# Patient Record
Sex: MALE | Race: WHITE | NOT HISPANIC OR LATINO | Employment: OTHER | URBAN - METROPOLITAN AREA
[De-identification: names, ages, dates, MRNs, and addresses within clinical notes are randomized per-mention and may not be internally consistent; named-entity substitution may affect disease eponyms.]

---

## 2020-01-29 ENCOUNTER — EVALUATION (OUTPATIENT)
Dept: PHYSICAL THERAPY | Facility: REHABILITATION | Age: 75
End: 2020-01-29
Payer: COMMERCIAL

## 2020-01-29 DIAGNOSIS — R26.2 AMBULATORY DYSFUNCTION: Primary | ICD-10-CM

## 2020-01-29 PROCEDURE — 97163 PT EVAL HIGH COMPLEX 45 MIN: CPT | Performed by: PHYSICAL THERAPIST

## 2020-01-29 PROCEDURE — 97535 SELF CARE MNGMENT TRAINING: CPT | Performed by: PHYSICAL THERAPIST

## 2020-01-29 NOTE — LETTER
2020    Juany Jimenez DPM  [facility]  [address] [facility]    Patient: Cruz Jacobs   YOB: 1945   Date of Visit: 2020     Encounter Diagnosis     ICD-10-CM    1  Ambulatory dysfunction R26 2        Dear Dr Thanh Mahoney: Thank you for your recent referral of Cruz Jacobs  Please review the attached evaluation summary from Laureano's recent visit  Please verify that you agree with the plan of care by signing the attached order  If you have any questions or concerns, please do not hesitate to call  I sincerely appreciate the opportunity to share in the care of one of your patients and hope to have another opportunity to work with you in the near future  Sincerely,    Camelia Brumfield, PT      Referring Provider:      I certify that I have read the below Plan of Care and certify the need for these services furnished under this plan of treatment while under my care  MICHAEL Dominiqu[facility]1  [facility]: [phone]          PT Evaluation     Today's date: 2020  Patient name: Cruz Jacobs  : 1945  MRN: [id number]  Referring provider: Ba Edward DPM  Dx:   Encounter Diagnosis     ICD-10-CM    1  Ambulatory dysfunction R26 2                   Assessment  Assessment details: Jaret Aguilar reports to PT with CC of declining balance and strength over past 2 weeks with insidious onset  Results of eval indicate abnormal gait pattern, impaired static balance, and decreased endurance  TUG test indicates he is at risk for falls  He had considerable difficulty with 6MWT, but with normal vitals readings following  His LE strength overall is good based on MMT's and 5xSTS test and coordination appears normal  Based on patient's subjective reports, his presentation is concerning as there is no diagnosis to explain his sudden change in mobility/function   The change in his balance and gait deficits are too rapid to be explained by normal age related changes  Will consult with PCP or referring provider regarding these findings  At this time he seems stable and presents with no signs indicating emergent medical examination  He would benefit from skilled PT to address these deficits, reduce fall risk, and reduce difficulty with ambulating  Understanding of Dx/Px/POC: good   Prognosis: good    Goals  STGs (4 weeks)  Pt will be independent with comprehensive HEP   Pt will be able to ambulate with SPC safely for house hold distances    LTG's (to be achieved by d/c)  Pt will be able to self manage sx's independently   Pt will demonstrate at least 5s improvement on TUG  Pt will improve on 6MWT by at least 200ft to improve community ambulation  Pt will be able to ambulate in community w/o difficulty with least restrictive AD    Plan  Plan details: Initiate POC  Will discuss findings with PCP or referring provider  Patient would benefit from: skilled physical therapy  Planned therapy interventions: balance, gait training, therapeutic activities, therapeutic exercise, stretching, strengthening, patient education and neuromuscular re-education  Frequency: 2x week  Duration in weeks: 8  Plan of Care beginning date: 1/29/2020  Plan of Care expiration date: 4/1/2020  Treatment plan discussed with: patient        Subjective Evaluation    History of Present Illness  Mechanism of injury: Pt states that has had worsening balance and strength which seems to have progressed since receiving a flu and pneumonia shot 2 weeks ago  Sx's started a few days after the vaccination  Last time he had a flu shot was over 20 years ago and states he had a reaction to it  Script was written for PT by his podiatrist  He mentioned to his Podiatrist that he was feeling more off balance, but did not mention his concerns regarding his vaccines  He has been doing a home exercise program, but states the exercises do NOT feel harder now       Pt claims that before two weeks ago he ambulated independently w/o AD  Occasionally used SPC in community for safety  Comes to PT today with RW which he states he needs to stay steady at all times  Pt states he was 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  Has been taking steroids since to treat this  He is not sure if this has contributed to his loss of strength and balance  Prednisone, he claims is his only recent medication change  Hx of diabetes type I, well controlled but has been worse with prednisone  States he podiatrist does not feel he has loss of sensation in feet  Reports difficulty with walking, steps, due to weakness and balance  Would like to go to [employer] show in March  Other Med hx: heart attack in , CABG x4, type I DM, vision loss R eye, R total knee     Pain  No pain reported          Objective    Flowsheet Rows      Most Recent Value   PT/OT G-Codes   Current Score  48   Projected Score  53        BP: 116/68  HR: 60bpm irregular   O2: 96% at rest     Balance Test    6 Minute Walk Test (ft): Test stopped at 2min 175ft due to 10/10 fatigue  (using RW)    HR: 98bpm  O2: 95%   2 Minute Walk Test (ft):    Gait Speed (ft/s):    5x Sit To Stand (s): 18 7s no UE's   TUs (using RW)         MCTSIB Number of Seconds   Feet Together, Eyes Open 30s   Feet Together, Eyes Closed 18s *   Feet Together, Eyes Open Foam 8s *   Feet Together, Eyes Closed Foam 0s *     *Pt reached for rails, however demonstrated only minor postural sway          Coordination Left Right   Heel To Cuellar Intact  intact   Finger To Nose Intact, but has shaking of the hands Intact, but has shaking of the hands   Rapid Alternating Movement Intact, but has shaking of the hands Intact, but has shaking of the hands   UE     LE           Manual Muscle Testing - Hip Left Right   Flexion 5 5                    Manual Muscle Testing - Knee Left Right   Flexion 5 5   Extension 5 5     Manual Muscle Testing - Ankle Left Right   Doriflexion 5 5                 Gait Assessment: Pt is able to walk w/o AD or UE support, however with significantly decreased step length  Precautions: Hx of heart disease, prednisone use, fall risk         Manual                                                                                   Exercise Diary  1/29            Bike              Sit to stands              Static balance              Hurdles              Step ups              Walking with [facility]             Education  On proper use of AD and safety on home/community                                                                                                                                                                                         Modalities

## 2020-01-29 NOTE — PROGRESS NOTES
PT Evaluation     Today's date: 2020  Patient name: Clinton Weaver  : 1945  MRN: [id number]  Referring provider: Josep Vizcarra DPM  Dx:   Encounter Diagnosis     ICD-10-CM    1  Ambulatory dysfunction R26 2                   Assessment  Assessment details: Shanda Chirinos reports to PT with CC of declining balance and strength over past 2 weeks with insidious onset  Results of eval indicate abnormal gait pattern, impaired static balance, and decreased endurance  TUG test indicates he is at risk for falls  He had considerable difficulty with 6MWT, but with normal vitals readings following  His LE strength overall is good based on MMT's and 5xSTS test and coordination appears normal  Based on patient's subjective reports, his presentation is concerning as there is no diagnosis to explain his sudden change in mobility/function  The change in his balance and gait deficits are too rapid to be explained by normal age related changes  Will consult with PCP or referring provider regarding these findings  At this time he seems stable and presents with no signs indicating emergent medical examination  He would benefit from skilled PT to address these deficits, reduce fall risk, and reduce difficulty with ambulating  Understanding of Dx/Px/POC: good   Prognosis: good    Goals  STGs (4 weeks)  Pt will be independent with comprehensive HEP   Pt will be able to ambulate with SPC safely for house hold distances    LTG's (to be achieved by d/c)  Pt will be able to self manage sx's independently   Pt will demonstrate at least 5s improvement on TUG  Pt will improve on 6MWT by at least 200ft to improve community ambulation  Pt will be able to ambulate in community w/o difficulty with least restrictive AD    Plan  Plan details: Initiate POC  Will discuss findings with PCP or referring provider     Patient would benefit from: skilled physical therapy  Planned therapy interventions: balance, gait training, therapeutic activities, therapeutic exercise, stretching, strengthening, patient education and neuromuscular re-education  Frequency: 2x week  Duration in weeks: 8  Plan of Care beginning date: 1/29/2020  Plan of Care expiration date: 4/1/2020  Treatment plan discussed with: patient        Subjective Evaluation    History of Present Illness  Mechanism of injury: Pt states that has had worsening balance and strength which seems to have progressed since receiving a flu and pneumonia shot 2 weeks ago  Sx's started a few days after the vaccination  Last time he had a flu shot was over 20 years ago and states he had a reaction to it  Script was written for PT by his podiatrist  He mentioned to his Podiatrist that he was feeling more off balance, but did not mention his concerns regarding his vaccines  He has been doing a home exercise program, but states the exercises do NOT feel harder now  Pt claims that before two weeks ago he ambulated independently w/o AD  Occasionally used SPC in community for safety  Comes to PT today with RW which he states he needs to stay steady at all times  Pt states he was 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  Has been taking steroids since to treat this  He is not sure if this has contributed to his loss of strength and balance  Prednisone, he claims is his only recent medication change  Hx of diabetes type I, well controlled but has been worse with prednisone  States he podiatrist does not feel he has loss of sensation in feet  Reports difficulty with walking, steps, due to weakness and balance  Would like to go to [employer] show in March  Other Med hx: heart attack in 1997, CABG x4, type I DM, vision loss R eye, R total knee     Pain  No pain reported          Objective    Flowsheet Rows      Most Recent Value   PT/OT G-Codes   Current Score  48   Projected Score  53        BP: 116/68  HR: 60bpm irregular   O2: 96% at rest     Balance Test    6 Minute Walk Test (ft): Test stopped at 2min 175ft due to 10/10 fatigue  (using RW)    HR: 98bpm  O2: 95%   2 Minute Walk Test (ft):    Gait Speed (ft/s):    5x Sit To Stand (s): 18 7s no UE's   TUs (using RW)         MCTSIB Number of Seconds   Feet Together, Eyes Open 30s   Feet Together, Eyes Closed 18s *   Feet Together, Eyes Open Foam 8s *   Feet Together, Eyes Closed Foam 0s *     *Pt reached for rails, however demonstrated only minor postural sway          Coordination Left Right   Heel To Cuellar Intact  intact   Finger To Nose Intact, but has shaking of the hands Intact, but has shaking of the hands   Rapid Alternating Movement Intact, but has shaking of the hands Intact, but has shaking of the hands   UE     LE           Manual Muscle Testing - Hip Left Right   Flexion 5 5                    Manual Muscle Testing - Knee Left Right   Flexion 5 5   Extension 5 5     Manual Muscle Testing - Ankle Left Right   Doriflexion 5 5                 Gait Assessment: Pt is able to walk w/o AD or UE support, however with significantly decreased step length  Precautions: Hx of heart disease, prednisone use, fall risk         Manual                                                                                   Exercise Diary              Bike              Sit to stands              Static balance              Hurdles              Step ups              Walking with [facility]             Education  On proper use of AD and safety on home/community                                                                                                                                                                                         Modalities

## 2020-01-31 ENCOUNTER — TRANSCRIBE ORDERS (OUTPATIENT)
Dept: PHYSICAL THERAPY | Facility: REHABILITATION | Age: 75
End: 2020-01-31

## 2020-01-31 DIAGNOSIS — R26.2 DIFFICULTY WALKING: Primary | ICD-10-CM

## 2020-03-19 NOTE — PROGRESS NOTES
Message was left on client's telephone  He was not followed up with PT since Dr Christelle Dean initial evaluation  If symptoms persist, please do not hesitate to call us  Will discharge at this time      Domenic Garcia PT, DPT, CSRS, ACCI

## 2020-12-22 PROBLEM — I50.32 CHRONIC DIASTOLIC HEART FAILURE (HCC): Status: ACTIVE | Noted: 2017-08-06

## 2020-12-22 PROBLEM — I10 ESSENTIAL HYPERTENSION: Status: ACTIVE | Noted: 2018-02-13

## 2020-12-22 PROBLEM — I48.92 ATRIAL FLUTTER (HCC): Status: ACTIVE | Noted: 2017-08-06

## 2020-12-22 PROBLEM — K21.9 GERD (GASTROESOPHAGEAL REFLUX DISEASE): Status: ACTIVE | Noted: 2020-05-26

## 2020-12-22 PROBLEM — G47.33 OBSTRUCTIVE SLEEP APNEA: Status: ACTIVE | Noted: 2020-08-26

## 2023-12-10 ENCOUNTER — TELEPHONE (OUTPATIENT)
Dept: OTHER | Facility: OTHER | Age: 78
End: 2023-12-10

## 2023-12-10 NOTE — TELEPHONE ENCOUNTER
Sandra Smith from [facility] called regarding new pt admission.  Paged on call provider via tc

## 2023-12-11 ENCOUNTER — NURSING HOME VISIT (OUTPATIENT)
Dept: GERIATRICS | Facility: OTHER | Age: 78
End: 2023-12-11
Payer: COMMERCIAL

## 2023-12-11 DIAGNOSIS — D69.6 THROMBOCYTOPENIA (HCC): ICD-10-CM

## 2023-12-11 DIAGNOSIS — I50.32 CHRONIC DIASTOLIC HEART FAILURE (HCC): ICD-10-CM

## 2023-12-11 DIAGNOSIS — N18.31 STAGE 3A CHRONIC KIDNEY DISEASE (HCC): ICD-10-CM

## 2023-12-11 DIAGNOSIS — I10 ESSENTIAL HYPERTENSION: Primary | ICD-10-CM

## 2023-12-11 DIAGNOSIS — E11.69 TYPE 2 DIABETES MELLITUS WITH OTHER SPECIFIED COMPLICATION, WITH LONG-TERM CURRENT USE OF INSULIN (HCC): ICD-10-CM

## 2023-12-11 DIAGNOSIS — J44.9 CHRONIC OBSTRUCTIVE PULMONARY DISEASE, UNSPECIFIED COPD TYPE (HCC): ICD-10-CM

## 2023-12-11 DIAGNOSIS — Z79.4 TYPE 2 DIABETES MELLITUS WITH OTHER SPECIFIED COMPLICATION, WITH LONG-TERM CURRENT USE OF INSULIN (HCC): ICD-10-CM

## 2023-12-11 PROCEDURE — 99306 1ST NF CARE HIGH MDM 50: CPT | Performed by: INTERNAL MEDICINE

## 2023-12-11 NOTE — PROGRESS NOTES
[facility]  [address]  [place] [address]  [facility]    Nursing Home Admission    NAME: Laureano Adair  AGE: 78 y.o. SEX: male [id number]    DATE OF ENCOUNTER: 12/11/2023    Assessment/Plan:   Patient’s care was coordinated with nursing facility staff. Recent vitals, labs and updated medications were reviewed on [facility]. Past Medical, surgical, social, medication and allergy history and patient’s previous records reviewed.     1. Essential hypertension        2. Chronic diastolic heart failure (HCC)        3. Stage 3a chronic kidney disease (HCC)        4. Chronic obstructive pulmonary disease, unspecified COPD type (HCC)        5. Type 2 diabetes mellitus with other specified complication, with long-term current use of insulin (HCC)        6. Thrombocytopenia (HCC)             Essential hypertension  12/11/2023 16:37 136 / 58 mmHg   BP stable  Cont carvedilol 3.125 mg 1/2 tablet po BID  Cot torsemide 20 mg 1 tab po daily  Cont terazosin 1 mg 1 tab po daily    Chronic diastolic heart failure (HCC)  Pt with clear lungs  Pt with significant BLE swelling  Pt refuses ISIAH/acewraps  Cont torsemide 20 mg 1 tab po daily  Monitor regular weights  12/11/2023 12:27 224.7 Lbs           Stage 3a chronic kidney disease (HCC)  Baseline Cr appears to be 1.17-1.54  Check labs in am  Pt on torsemide 20 mg 1 tab po daily  Monitor regular BMP given hx of CKD3a  Monitor weight  Avoid nsaids/nephrotoxins/IV contrast    Chronic obstructive pulmonary disease (HCC)  Stable  Pt with clear lungs  10/2013 outpatient PFTs showed mild obstruction  Consider outpatient PFTs with pulmonary    Type 2 diabetes mellitus (HCC)  Goal Hga1c in geriatric population is >7.5 to avoid hypoglycemia  Pt's Hga1c stable  Lab Results   Component Value Date    HGBA1C 6.0 (H) 08/24/2023   Pt off short acting insulin now  Cont insulin glargine 25 units SQ daily    Thrombocytopenia  ([facility])  Stable  10/17/2023 plt 252        Chief Complaint     Here for LTC    HPI   Patient is a 78 y.o. male with PMH COPD, GERD, HTN, DMII, MATTY, CKD3a, chronic rt sided low back pain without sciatica, Atrial flutter, chronic diastolic chf, secondary hyperparathyroidism, central retinal aa occlusion of Rt eye and morbid obesity.    Pt sent as a direct long term admission for CHF. Pt admitted to [facility] on 12/10/2023. Pt seen and examined. Pt A&Ox3. He wanted to later go off the floor so the lead nurse got a CNA to take him to the second floor since he is a new pt. Pt denies any N/v/fever, chills. Pt appears medically stable at this time.  Pt declines ISIAH/Ace wrap. He even came up to nurses station to inform me and Bing nurse that he did not want those.       Past Medical History:   Diagnosis Date    Acute hypoxemic respiratory failure ([facility]) 05/19/2023    Acute on chronic HFrEF (heart failure with reduced ejection fraction) ([facility]) 10/13/2023    Atrial flutter ([facility])     CAP (community acquired pneumonia) 05/18/2023    Central retinal artery occlusion of right eye 11/21/2019    Last Assessment & Plan:    Formatting of this note might be different from the original.   No signs of vascular changes.  Intraocular pressures remain stable.  Continue observation.  Monocular precautions advised.    CKD (chronic kidney disease) stage 3, GFR 30-59 ml/min ([facility])     COPD (chronic obstructive pulmonary disease) ([facility])     COVID-19 10/13/2023    Diabetes mellitus ([facility])     Effusion, right knee 07/02/2021    GERD (gastroesophageal reflux disease)     Hypertension     Leukocytosis 07/04/2021    MATTY (obstructive sleep apnea)     Pressure injury of buttock, stage 3 ([facility]) 07/19/2021    Pseudophakia of both eyes 05/13/2021    Last Assessment & Plan:    Formatting of this note might be different from the original.   Lenses in good position with patent capsule in both eyes.  Continue observation.  Glasses for protection advised.     Right hip joint effusion 07/02/2021    Vision loss of right eye 11/20/2019    Last Assessment & Plan:    Formatting of this note might be different from the original.   Stable findings on examination.  At this time binocular vision appears good centrally.  Definite concern over peripheral vision especially in the right eye.  Advised in order to consider for driving he would have to have binocular [place].  Per patient there are other concerns with physical limitations and        Past Surgical History:   Procedure Laterality Date    CORONARY ARTERY BYPASS GRAFT      with sternal reconstruction    HERNIA REPAIR      NEPHRECTOMY Right     for renal cell carcinoma    REPLACEMENT TOTAL KNEE Right     TOTAL HIP ARTHROPLASTY Left        Social History     Tobacco Use   Smoking Status Former   Smokeless Tobacco Not on file          Family History   Problem Relation Age of Onset    Leukemia Mother     Esophageal cancer Father     Colon cancer Neg Hx         Allergies   Allergen Reactions    Abciximab Other (See Comments)     1/97 rec'd no rxn; Pt unsure of any reaction      Medical Tape      Allopurinol Tablet 100 MG    Ascorbic Acid Tablet 500 MG Give 2 tablet by mouth one time a day related to VITAMIN DEFICIENCY, UNSPECIFIED (E56.9)   Remedy Phytoplex Antifungal Powder (Miconazole Nitrate 2%) Apply to B/L ABD and groin topically every day and evening shift for anti-fungal treatment for 14 Days   No-Sting Skin-Prep Miscellaneous (Skin Protectants, Misc.) Apply to Bilateral heels topically every day and evening shift for for protection for 14 Days   Acetaminophen Tablet 325 MG Give 2 tablet by mouth every 4 hours as needed for Mild Pain Max 3 GM APAP / 24 HR, Give Suppository If Unable to Take By Mouth AND Give 2 tablet by mouth every 4 hours as needed for Temperature = or > 100 degrees Oral / 101 degrees Rectal Max 3 GM APAP / 24 HR, Give Suppository If Unable to Take By Mouth   Acetaminophen Suppository 650 MG Insert 1  suppository rectally every 4 hours as needed for Mild Pain Max 3 GM APAP / 24 HR, Give Suppository If Unable to Take By Mouth AND Insert 1 suppository rectally every 4 hours as needed for Temperature = or > 100 degrees Oral / 101 degrees Rectal Max 3 GM APAP / 24 HR, Give Suppository If Unable to Take By Mouth   Milk of Magnesia Suspension 2400 MG/30ML Give 30 ml by mouth as needed for Constipation 1 Time a Day, If No BM on by Day # 2, Evening Nurse Gives a Laxative on Day # 2 AND Give 30 ml by mouth as needed for Constipation 1 Time a Day, If No BM on by Day # 3, Evening Nurse Gives a Laxative on Day # 3   Bisac-Evac Suppository 10 MG (Bisacodyl) Insert 1 suppository rectally as needed for Constipation 1 Time a Day, If NO BM by AM Day # 4, Day Nurse Gives a Suppository that AM   Enema Disposable Enema (Sodium Phosphates) Insert 1 application rectally as needed for Constipation 1 Time a Day, If NO BM within 3-5 Hours of Suppository Insertion Give Enema   Melatonin Oral Tablet 5 MG (Melatonin) Give 1 tablet by mouth at bedtime related to INSOMNIA, UNSPECIFIED (G47.00)   Ferrous Sulfate Tablet 325 (65 Fe) MG Give 1 tablet by mouth one time a day every other day related to ANEMIA, UNSPECIFIED (D64.9) *DO NOT CRUSH*   Torsemide Tablet 20 MG Give 1 tablet by mouth one time a day related to HEART FAILURE, UNSPECIFIED (I50.9)   Carvedilol Oral Tablet 3.125 MG (Carvedilol) Give 0.5 tablet by mouth two times a day related to ESSENTIAL (PRIMARY) HYPERTENSION (I10)   Terazosin HCl Oral Capsule 1 MG (Terazosin HCl) Give 1 capsule by mouth one time a day related to ESSENTIAL (PRIMARY) HYPERTENSION (I10)   Glucagon Emergency Injection Kit 1 MG (Glucagon (rDNA)) Inject 1 mg subcutaneously every 15 minutes as needed for Hypoglycemia related to TYPE 2 DIABETES MELLITUS WITH DIABETIC CHRONIC KIDNEY DISEASE (E11.22) Inject 1 mg subcutaneously Q15 min. 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 (N40.0) *HANDLING PRECAUTION: WEAR GLOVES*   Loratadine Oral Tablet 10 MG (Loratadine) Give 1 tablet by mouth one time a day related to ALLERGIC RHINITIS, UNSPECIFIED (J30.9)   Hydrocortisone External Cream 1 % (Hydrocortisone (Topical)) Apply to back topically every day and evening shift related to RASH AND OTHER NONSPECIFIC SKIN ERUPTION (R21) for 14 Days   Aspirin Oral Tablet Delayed Release 81 MG (Aspirin) Give 1 tablet by mouth one time a day related to ATHEROSCLEROTIC HEART DISEASE OF NATIVE CORONARY ARTERY WITHOUT ANGINA PECTORIS (I25.10)   Atorvastatin Calcium Tablet 40 MG Give 1 tablet by mouth at bedtime related to HYPERLIPIDEMIA, UNSPECIFIED (E78.5)   Insulin Glargine Subcutaneous Solution Pen-injector 100 UNIT/ML (Insulin Glargine) Inject 25 unit subcutaneously one time a day related to TYPE 2 DIABETES MELLITUS WITH DIABETIC CHRONIC KIDNEY DISEASE (E11.22)   TraZODone HCl Tablet 150 MG Give 1 tablet by mouth at bedtime related to DEPRESSION, UNSPECIFIED (F32.A) Give 1 tablet by mouth at bedtime       Updated list was reviewed in [facility] system of facility.     Vital signs were reviewed in [facility]    Review of Systems   Cardiovascular:  Positive for leg swelling.   All other systems reviewed and are negative.      Physical Exam  Constitutional:       General: He is not in acute distress.     Appearance: He is not ill-appearing.   HENT:      Head: Normocephalic.   Cardiovascular:      Rate and Rhythm: Normal rate and regular rhythm.      Heart sounds: Normal heart sounds.   Pulmonary:      Effort: No respiratory distress.      Breath sounds: Normal breath sounds. No wheezing.   Abdominal:      General: Bowel sounds are normal. There is no distension.      Palpations: Abdomen is soft.      Tenderness: There is no abdominal tenderness.    Musculoskeletal:      Right lower leg: Edema present.      Left lower leg: Edema present.   Skin:     Comments: Skin excoriation on Rt leg; markedly swollen b/l legs   Neurological:      Mental Status: He is alert and oriented to person, place, and time.   Psychiatric:         Mood and Affect: Mood normal.         Thought Content: Thought content normal.         Judgment: Judgment normal.       Diagnostic Data     Recent labs and imaging studies were reviewed.    10/17/2023 BMP:  Glucose  65 - 99 mg/dL 78   BUN  7 - 28 mg/dL 34 High    Creatinine  0.53 - 1.30 mg/dL 1.20   Sodium  135 - 145 mmol/L 134 Low    Potassium  3.5 - 5.2 mmol/L 4.0   Chloride  100 - 109 mmol/L 101   Carbon Dioxide  21 - 31 mmol/L 28   Calcium  8.5 - 10.1 mg/dL 9.1   Anion Gap  3 - 11 5   eGFRcr  >59 61     CBC:  Hemoglobin  12.5 - 17.0 g/dL 9.8 Low    Hematocrit  37.0 - 48.0 % 29.8 Low    WBC  4.0 - 10.5 thou/cmm 6.3   RBC  4.00 - 5.40 mill/cmm 3.43 Low    Platelet Count  140 - 350 thou/cmm 252   MPV  7.5 - 11.3 fL 7.7   MCV  80 - 100 fL 87   MCH  27.0 - 36.0 pg 28.6   MCHC  32.0 - 37.0 g/dL 33.0   RDW  12.0 - 16.0 % 17.6 High         Code Status:    AND/DNR    Additional notes:    Total time spent on H&P 48 minutes in reviewing discharge paperwork from the hospital, interpreting test results from hospital medical records, and documenting information in the medical record. In addition, I provided counseling to the patient and encouraged them to work with physical therapy.  I spent time fixing the medication list in epic and matching it to [facility] home meds.    This note was electronically signed by Dr. Ailyn Andre  [facility]

## 2023-12-14 PROBLEM — Z96.1 PSEUDOPHAKIA OF BOTH EYES: Status: RESOLVED | Noted: 2021-05-13 | Resolved: 2023-12-14

## 2023-12-14 PROBLEM — I5A MYOCARDIAL INJURY: Status: ACTIVE | Noted: 2023-05-19

## 2023-12-14 PROBLEM — I50.23 ACUTE ON CHRONIC HFREF (HEART FAILURE WITH REDUCED EJECTION FRACTION) (HCC): Status: RESOLVED | Noted: 2023-10-13 | Resolved: 2023-12-14

## 2023-12-14 PROBLEM — M25.451 RIGHT HIP JOINT EFFUSION: Status: RESOLVED | Noted: 2021-07-02 | Resolved: 2023-12-14

## 2023-12-14 PROBLEM — L89.303 PRESSURE INJURY OF BUTTOCK, STAGE 3 (HCC): Status: RESOLVED | Noted: 2021-07-19 | Resolved: 2023-12-14

## 2023-12-14 PROBLEM — N25.81 SECONDARY HYPERPARATHYROIDISM (HCC): Status: ACTIVE | Noted: 2022-10-13

## 2023-12-14 PROBLEM — J96.01 ACUTE HYPOXEMIC RESPIRATORY FAILURE (HCC): Status: RESOLVED | Noted: 2023-05-19 | Resolved: 2023-12-14

## 2023-12-14 PROBLEM — S72.91XA FEMUR FRACTURE, RIGHT (HCC): Status: RESOLVED | Noted: 2023-06-30 | Resolved: 2023-12-14

## 2023-12-14 PROBLEM — D69.6 THROMBOCYTOPENIA (HCC): Status: ACTIVE | Noted: 2021-10-06
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
## 2023-12-14 PROBLEM — H04.129 DRY EYES DUE TO DECREASED TEAR PRODUCTION: Status: ACTIVE | Noted: 2021-11-23

## 2023-12-14 PROBLEM — H54.61 VISION LOSS OF RIGHT EYE: Status: RESOLVED | Noted: 2019-11-20 | Resolved: 2023-12-14

## 2023-12-14 PROBLEM — N18.31 STAGE 3A CHRONIC KIDNEY DISEASE (HCC): Status: ACTIVE | Noted: 2017-09-26

## 2023-12-14 PROBLEM — E87.1 HYPONATREMIA: Status: ACTIVE | Noted: 2021-07-04

## 2023-12-14 PROBLEM — D72.829 LEUKOCYTOSIS: Status: RESOLVED | Noted: 2021-07-04 | Resolved: 2023-12-14

## 2023-12-14 PROBLEM — M25.461 EFFUSION, RIGHT KNEE: Status: RESOLVED | Noted: 2021-07-02 | Resolved: 2023-12-14

## 2023-12-14 PROBLEM — J18.9 CAP (COMMUNITY ACQUIRED PNEUMONIA): Status: RESOLVED | Noted: 2023-05-18 | Resolved: 2023-12-14

## 2023-12-14 PROBLEM — H34.11 CENTRAL RETINAL ARTERY OCCLUSION OF RIGHT EYE: Status: RESOLVED | Noted: 2019-11-21 | Resolved: 2023-12-14

## 2023-12-14 PROBLEM — U07.1 COVID-19: Status: RESOLVED | Noted: 2023-10-13 | Resolved: 2023-12-14

## 2023-12-17 ENCOUNTER — NURSING HOME VISIT (OUTPATIENT)
Dept: GERIATRICS | Facility: OTHER | Age: 78
End: 2023-12-17
Payer: COMMERCIAL

## 2023-12-17 DIAGNOSIS — M25.532 LEFT WRIST PAIN: Primary | ICD-10-CM

## 2023-12-17 PROCEDURE — 99309 SBSQ NF CARE MODERATE MDM 30: CPT | Performed by: FAMILY MEDICINE

## 2023-12-17 RX ORDER — TERAZOSIN 1 MG/1
1 CAPSULE ORAL
COMMUNITY
Start: 2023-11-24

## 2023-12-17 RX ORDER — ALLOPURINOL 100 MG/1
100 TABLET ORAL DAILY
COMMUNITY
Start: 2023-11-20

## 2023-12-17 RX ORDER — INSULIN GLARGINE 100 [IU]/ML
25 INJECTION, SOLUTION SUBCUTANEOUS DAILY
COMMUNITY
Start: 2023-11-11

## 2023-12-17 RX ORDER — LORATADINE 10 MG/1
10 TABLET ORAL DAILY
COMMUNITY

## 2023-12-17 RX ORDER — FINASTERIDE 5 MG/1
5 TABLET, FILM COATED ORAL DAILY
COMMUNITY
Start: 2023-12-09

## 2023-12-17 RX ORDER — ACETAMINOPHEN 325 MG/1
650 TABLET ORAL EVERY 6 HOURS PRN
COMMUNITY

## 2023-12-17 RX ORDER — TORSEMIDE 20 MG/1
20 TABLET ORAL DAILY
COMMUNITY
Start: 2023-10-05

## 2023-12-17 RX ORDER — TRAZODONE HYDROCHLORIDE 150 MG/1
150 TABLET ORAL
COMMUNITY
Start: 2023-11-04

## 2023-12-17 RX ORDER — CARVEDILOL 3.12 MG/1
1.56 TABLET ORAL 2 TIMES DAILY WITH MEALS
COMMUNITY
Start: 2023-12-05

## 2023-12-17 NOTE — PROGRESS NOTES
[facility]  [address]  [place] [address]  Facility:  [facility]    Acute visit  NAME: Laureano Adair  AGE: 78 y.o. SEX: male    DATE OF ENCOUNTER: 12/17/23    Code status:  No CPR    Assessment and Plan     1. Left wrist pain  Assessment & Plan:  Has history of gout  Will get Uric acid levels  Continue current dose of Allopurinol  Topical Voltaren gel and wrist splint  Can increase dose of Allopurinol id Uric acid levels elevated            All medications and routine orders were reviewed and updated as needed.    Plan discussed with: Patient    Chief Complaint     Seen for left wrist and hand swelling    History of Present Illness     Patient is a 78 year old male medical comorbidities: COPD, GERD, hypertension, diabetes, MATTY complaining  of left wrist swelling and pain rates pain 4  No history of trauma or fall.    HISTORY:  Past Medical History:   Diagnosis Date    Acute hypoxemic respiratory failure ([facility]) 05/19/2023    Acute on chronic HFrEF (heart failure with reduced ejection fraction) ([facility]) 10/13/2023    Atrial flutter ([facility])     CAP (community acquired pneumonia) 05/18/2023    Central retinal artery occlusion of right eye 11/21/2019    Last Assessment & Plan:    Formatting of this note might be different from the original.   No signs of vascular changes.  Intraocular pressures remain stable.  Continue observation.  Monocular precautions advised.    CKD (chronic kidney disease) stage 3, GFR 30-59 ml/min ([facility])     COPD (chronic obstructive pulmonary disease) ([facility])     COVID-19 10/13/2023    Diabetes mellitus ([facility])     Effusion, right knee 07/02/2021    GERD (gastroesophageal reflux disease)     Hypertension     Leukocytosis 07/04/2021    MATTY (obstructive sleep apnea)     Pressure injury of buttock, stage 3 ([facility]) 07/19/2021    Pseudophakia of both eyes 05/13/2021    Last Assessment & Plan:    Formatting of this note might be different from the original.    Lenses in good position with patent capsule in both eyes.  Continue observation.  Glasses for protection advised.    Right hip joint effusion 07/02/2021    Vision loss of right eye 11/20/2019    Last Assessment & Plan:    Formatting of this note might be different from the original.   Stable findings on examination.  At this time binocular vision appears good centrally.  Definite concern over peripheral vision especially in the right eye.  Advised in order to consider for driving he would have to have binocular [place].  Per patient there are other concerns with physical limitations and      Past Surgical History:   Procedure Laterality Date    CORONARY ARTERY BYPASS GRAFT      with sternal reconstruction    HERNIA REPAIR      NEPHRECTOMY Right     for renal cell carcinoma    REPLACEMENT TOTAL KNEE Right     TOTAL HIP ARTHROPLASTY Left      Family History   Problem Relation Age of Onset    Leukemia Mother     Esophageal cancer Father     Colon cancer Neg Hx      Social History     Socioeconomic History    Marital status: Unknown     Spouse name: None    Number of children: None    Years of education: None    Highest education level: None   Occupational History    None   Tobacco Use    Smoking status: Former    Smokeless tobacco: None   Substance and Sexual Activity    Alcohol use: None    Drug use: None    Sexual activity: None   Other Topics Concern    None   Social History Narrative    None     Social Determinants of Health     Financial Resource Strain: Not on file   Food Insecurity: Not on file   Transportation Needs: Not on file   Physical Activity: Not on file   Stress: Not on file   Social Connections: Not on file   Intimate Partner Violence: Not on file   Housing Stability: Not on file       Allergies:  Allergies   Allergen Reactions    Abciximab Other (See Comments)     1/97 rec'd no rxn; Pt unsure of any reaction      Medical Tape        Review of Systems     Review of Systems   Constitutional:  Negative  "for appetite change and fatigue.   Respiratory:  Negative for apnea and chest tightness.    Musculoskeletal:  Positive for arthralgias and joint swelling.   Neurological:  Negative for dizziness.   Psychiatric/Behavioral:  Negative for agitation and behavioral problems.        Medications and orders     All medications reviewed and updated in long term EMR.      Objective           Physical Exam  Cardiovascular:      Rate and Rhythm: Normal rate and regular rhythm.   Pulmonary:      Effort: Pulmonary effort is normal.      Breath sounds: Normal breath sounds.   Musculoskeletal:      Comments: Swelling and tenderness left wrist    Psychiatric:         Mood and Affect: Mood normal.         Behavior: Behavior normal.         Pertinent Laboratory/Diagnostic Studies:   The following labs/studies were reviewed please see chart or hospital paperwork for details.      - Medication Side Effects: Adverse side effects of medications were reviewed with the patient/guardian today.    Portions of the record may have been created with voice recognition software.  Occasional wrong word or \"sound a like\" substitutions may have occurred due to the inherent limitations of voice recognition software.  Read the chart carefully and recognize, using context, where substitutions have occurred.    Cynthia Weber M.D.     "

## 2023-12-18 NOTE — ASSESSMENT & PLAN NOTE
Stable  Pt with clear lungs  10/2013 outpatient PFTs showed mild obstruction  Consider outpatient PFTs with pulmonary

## 2023-12-18 NOTE — ASSESSMENT & PLAN NOTE
12/11/2023 16:37 136 / 58 mmHg   BP stable  Cont carvedilol 3.125 mg 1/2 tablet po BID  Cot torsemide 20 mg 1 tab po daily  Cont terazosin 1 mg 1 tab po daily

## 2023-12-18 NOTE — ASSESSMENT & PLAN NOTE
Baseline Cr appears to be 1.17-1.54  Check labs in am  Pt on torsemide 20 mg 1 tab po daily  Monitor regular BMP given hx of CKD3a  Monitor weight  Avoid nsaids/nephrotoxins/IV contrast

## 2023-12-18 NOTE — ASSESSMENT & PLAN NOTE
Pt with clear lungs  Pt with significant BLE swelling  Pt refuses ISIAH/acewraps  Cont torsemide 20 mg 1 tab po daily  Monitor regular weights  12/11/2023 12:27 224.7 Lbs

## 2023-12-18 NOTE — ASSESSMENT & PLAN NOTE
Goal Hga1c in geriatric population is >7.5 to avoid hypoglycemia  Pt's Hga1c stable  Lab Results   Component Value Date    HGBA1C 6.0 (H) 08/24/2023   Pt off short acting insulin now  Cont insulin glargine 25 units SQ daily

## 2023-12-25 VITALS
HEART RATE: 72 BPM | DIASTOLIC BLOOD PRESSURE: 74 MMHG | SYSTOLIC BLOOD PRESSURE: 122 MMHG | TEMPERATURE: 98.1 F | WEIGHT: 259.3 LBS

## 2023-12-25 PROBLEM — M25.532 LEFT WRIST PAIN: Status: ACTIVE | Noted: 2023-12-25

## 2023-12-25 NOTE — ASSESSMENT & PLAN NOTE
Has history of gout  Will get Uric acid levels  Continue current dose of Allopurinol  Topical Voltaren gel and wrist splint  Can increase dose of Allopurinol id Uric acid levels elevated

## 2023-12-26 ENCOUNTER — NURSING HOME VISIT (OUTPATIENT)
Dept: GERIATRICS | Facility: OTHER | Age: 78
End: 2023-12-26
Payer: COMMERCIAL

## 2023-12-26 VITALS
TEMPERATURE: 98.1 F | DIASTOLIC BLOOD PRESSURE: 74 MMHG | SYSTOLIC BLOOD PRESSURE: 122 MMHG | OXYGEN SATURATION: 95 % | RESPIRATION RATE: 16 BRPM | HEART RATE: 72 BPM | WEIGHT: 222.2 LBS

## 2023-12-26 DIAGNOSIS — N18.31 STAGE 3A CHRONIC KIDNEY DISEASE (HCC): ICD-10-CM

## 2023-12-26 DIAGNOSIS — I50.32 CHRONIC DIASTOLIC HEART FAILURE (HCC): Primary | ICD-10-CM

## 2023-12-26 PROCEDURE — 99308 SBSQ NF CARE LOW MDM 20: CPT

## 2023-12-27 NOTE — ASSESSMENT & PLAN NOTE
Recent increase of weight documented by facility  Question to as if it is accurate   Weight documented today of 222.2 lbs appears accurate according to admission weight of 221.5 lbs on 12/10  Increase in bilateral lower extremity edema   Able to participate in exercise  No shortness of breath noted on exam  Continue low sodium diet  Knee high TEDS stockings ordered  Not seen on resident today, spoke with nursing staff   VSS  Reviewed recent blood work from 12/18  Start torsemide 20 mg po BID x 2 days  Repeat BMP on 12/29

## 2023-12-27 NOTE — PROGRESS NOTES
[facility]  [address] [place] [address], [address], [place] [address]  [phone]    NAME: Laureano Adair  AGE: 78 y.o. SEX: male    Progress Note    Location: [facility]   POS: 32 ([facility])  Code Status: DNR    Chief complaint / Reason for visit:  Acute Visit    Assessment/Plan:    Chronic diastolic heart failure (HCC)  Recent increase of weight documented by facility  Question to as if it is accurate   Weight documented today of 222.2 lbs appears accurate according to admission weight of 221.5 lbs on 12/10  Increase in bilateral lower extremity edema   Able to participate in exercise  No shortness of breath noted on exam  Continue low sodium diet  Knee high TEDS stockings ordered  Not seen on resident today, spoke with nursing staff   VSS  Reviewed recent blood work from 12/18  Start torsemide 20 mg po BID x 2 days  Repeat BMP on 12/29          Stage 3a chronic kidney disease (HCC)  Baseline creatinine 1.17-1.54  Recent CMP on 12/18  Creatinine-1.40  BUN-35  Repeat BMP on 12/29      This is an 78 y.o. male seen today at [facility].  Medical history includes, not limited to, CHF, atherosclerotic heart disease, hypertension, BPH, DM type 2, CKD, vitamin D deficiency, and COPD.    Resident is seen today for an acute visit.  He reports increased swelling to his bilateral lower extremities.  On exam he is working with therapy completing exercises.  He reports no increased pain with exercises.  He has bilateral lower extremity edema and he states that he has been scratching his right leg.  Denies shortness of breath, cough, change in activity.  No change in bowel or bladder.    Reviewed resident with nursing staff.  New orders placed.  Reviewed recent labs, vitals and orders.           Nursing and prior provider notes reviewed on this visit. Discussed visit with PCP and nursing staff/ supervisor.      Review of Systems   Constitutional:  Positive for activity change. Negative for appetite change,  fatigue and fever.   HENT:  Positive for hearing loss. Negative for congestion and rhinorrhea.    Eyes:  Negative for pain and visual disturbance.   Respiratory:  Negative for cough and shortness of breath.    Cardiovascular:  Positive for leg swelling. Negative for chest pain.   Gastrointestinal:  Negative for abdominal pain and constipation.   Genitourinary:  Negative for difficulty urinating and dysuria.   Musculoskeletal:  Positive for gait problem. Negative for arthralgias.   Skin:  Negative for color change and rash.   Neurological:  Negative for dizziness, syncope and weakness.   Psychiatric/Behavioral:  Negative for behavioral problems and confusion.    All other systems reviewed and are negative.         ALLERGY: Reviewed, unchanged  Allergies   Allergen Reactions    Abciximab Other (See Comments)     1/97 rec'd no rxn; Pt unsure of any reaction      Medical Tape        HISTORY:  Medical Hx: Reviewed, unchanged  Family Hx: Reviewed, unchanged  Soc Hx: Reviewed,  unchanged      Physical Exam  Vitals reviewed.   Constitutional:       General: He is not in acute distress.     Appearance: Normal appearance. He is not ill-appearing.   HENT:      Head: Normocephalic.      Right Ear: Tympanic membrane normal.      Left Ear: Tympanic membrane normal.      Nose: Nose normal. No congestion.      Mouth/Throat:      Mouth: Mucous membranes are moist.      Pharynx: Oropharynx is clear.   Eyes:      General:         Right eye: No discharge.         Left eye: No discharge.      Extraocular Movements: Extraocular movements intact.      Conjunctiva/sclera: Conjunctivae normal.      Pupils: Pupils are equal, round, and reactive to light.   Cardiovascular:      Rate and Rhythm: Normal rate and regular rhythm.      Pulses: Normal pulses.      Heart sounds: Normal heart sounds.   Pulmonary:      Effort: Pulmonary effort is normal. No respiratory distress.      Breath sounds: Normal breath sounds.   Chest:      Chest wall: No  "tenderness.   Abdominal:      General: Bowel sounds are normal.      Palpations: Abdomen is soft.      Tenderness: There is no abdominal tenderness.   Musculoskeletal:         General: Tenderness present. No swelling. Normal range of motion.      Cervical back: Normal range of motion.      Right lower leg: Edema present.      Left lower leg: Edema present.   Skin:     General: Skin is warm and dry.   Neurological:      Mental Status: He is alert and oriented to person, place, and time. Mental status is at baseline.      Motor: No weakness.   Psychiatric:         Mood and Affect: Mood normal.         Behavior: Behavior normal.         Thought Content: Thought content normal.            Laboratory results / Imaging reviewed: Hard copy/ies in medical chart:    Vitals:    12/26/23 1911   BP: 122/74   Pulse: 72   Resp: 16   Temp: 98.1 °F (36.7 °C)   SpO2: 95%       Current Medications:  All medications reviewed and updated in Nursing Home Chart    Please note: This note was completed in part utilizing a voice-recognition software may have been used in the preparation of this document. Grammatical errors, random word insertion, spelling mistakes, and incomplete sentences may be an occasional consequence of the system secondary to software limitations, ambient noise and hardware issues. Occasional wrong word or \"sound-alike\" substitutions may have occurred due to the inherent limitations of voice recognition software. At the time of dictation, efforts were made to edit, clarify and/or correct errors. Interpretation should be guided by context. Please read the chart carefully and recognize, using context, where substitutions have occurred. If you have any questions or concerns about the context, text or information contained within the body of this dictation, please contact myself, the provider, for further clarification.      VICKI Elizabeth  12/26/2023  "

## 2023-12-27 NOTE — ASSESSMENT & PLAN NOTE
Baseline creatinine 1.17-1.54  Recent CMP on 12/18  Creatinine-1.40  BUN-35  Repeat BMP on 12/29   og

## 2024-01-10 ENCOUNTER — NURSING HOME VISIT (OUTPATIENT)
Dept: GERIATRICS | Facility: OTHER | Age: 79
End: 2024-01-10
Payer: COMMERCIAL

## 2024-01-10 DIAGNOSIS — N18.31 STAGE 3A CHRONIC KIDNEY DISEASE (HCC): ICD-10-CM

## 2024-01-10 DIAGNOSIS — E11.69 TYPE 2 DIABETES MELLITUS WITH OTHER SPECIFIED COMPLICATION, WITH LONG-TERM CURRENT USE OF INSULIN (HCC): Primary | ICD-10-CM

## 2024-01-10 DIAGNOSIS — I50.32 CHRONIC DIASTOLIC HEART FAILURE (HCC): ICD-10-CM

## 2024-01-10 DIAGNOSIS — Z79.4 TYPE 2 DIABETES MELLITUS WITH OTHER SPECIFIED COMPLICATION, WITH LONG-TERM CURRENT USE OF INSULIN (HCC): Primary | ICD-10-CM

## 2024-01-10 DIAGNOSIS — I10 ESSENTIAL HYPERTENSION: ICD-10-CM

## 2024-01-10 DIAGNOSIS — G89.29 CHRONIC RIGHT-SIDED LOW BACK PAIN WITHOUT SCIATICA: ICD-10-CM

## 2024-01-10 DIAGNOSIS — M54.50 CHRONIC RIGHT-SIDED LOW BACK PAIN WITHOUT SCIATICA: ICD-10-CM

## 2024-01-10 PROCEDURE — 99309 SBSQ NF CARE MODERATE MDM 30: CPT

## 2024-01-18 VITALS
HEART RATE: 72 BPM | OXYGEN SATURATION: 95 % | TEMPERATURE: 98.1 F | WEIGHT: 221 LBS | DIASTOLIC BLOOD PRESSURE: 74 MMHG | RESPIRATION RATE: 16 BRPM | SYSTOLIC BLOOD PRESSURE: 122 MMHG

## 2024-01-18 RX ORDER — SENNA AND DOCUSATE SODIUM 50; 8.6 MG/1; MG/1
1 TABLET, FILM COATED ORAL
COMMUNITY

## 2024-01-18 RX ORDER — CHOLECALCIFEROL (VITAMIN D3) 125 MCG
1 CAPSULE ORAL
COMMUNITY

## 2024-01-18 RX ORDER — MENTHOL 1.4 %
1 ADHESIVE PATCH, MEDICATED TOPICAL EVERY 8 HOURS PRN
COMMUNITY

## 2024-01-18 NOTE — ASSESSMENT & PLAN NOTE
Stable weight from admission date  Has roughly been around 220.0 lbs  Encourage use of knee high TEDS stockings  VSS  Continue torsemide 20 mg po daily  Continue salt restricted diet

## 2024-01-18 NOTE — ASSESSMENT & PLAN NOTE
Baseline creatinine 1.17-1.54  Recent BMP on 01/03/24  Creatinine-1.63  BUN-40  Continue to monitor periodically   Monitor I/O's  Encourage PO hydration  Monitor for hypertension  Avoid hypotension

## 2024-01-18 NOTE — ASSESSMENT & PLAN NOTE
Blood pressures reviewed from facility records  Stable  Continue carvedilol 1.56 mg po BID  Continue terazosin 1 mg po daily at bedtime  Continue torsemide 20 mg po daily  Encourage medication adherence   Continue to monitor blood pressure weekly   Encourage heart healthy diet

## 2024-01-18 NOTE — PROGRESS NOTES
[address], [address], [place] [address]  [phone]    NAME: Laureano Adair  AGE: 78 y.o. SEX: male    Progress Note    Location: [facility]  POS: 32 ([facility])  Code Status: DNR    Chief complaint / Reason for visit:  Follow-up visit    Assessment/Plan:    Chronic right-sided low back pain without sciatica  Pain controlled  Moving around unit well in wheelchair  Seen working with PT/OT completing exercises    Stage 3a chronic kidney disease (HCC)  Baseline creatinine 1.17-1.54  Recent BMP on 01/03/24  Creatinine-1.63  BUN-40  Continue to monitor periodically   Monitor I/O's  Encourage PO hydration  Monitor for hypertension  Avoid hypotension    Chronic diastolic heart failure (HCC)  Stable weight from admission date  Has roughly been around 220.0 lbs  Encourage use of knee high TEDS stockings  VSS  Continue torsemide 20 mg po daily  Continue salt restricted diet    Essential hypertension  Blood pressures reviewed from facility records  Stable  Continue carvedilol 1.56 mg po BID  Continue terazosin 1 mg po daily at bedtime  Continue torsemide 20 mg po daily  Encourage medication adherence   Continue to monitor blood pressure weekly   Encourage heart healthy diet    Type 2 diabetes mellitus (HCC)    Lab Results   Component Value Date    HGBA1C 6.0 (H) 08/24/2023     Goal HgA1c in geriatric population is > 7.5 to avoid hypoglycemia  Continue to monitor HgA1c Q6 months  Continue insulin glargine 25 units SQ daily      This is an 78 y.o. male seen today at [facility].  Medical history includes, not limited to, CHF, atherosclerotic heart disease, hypertension, BPH, DM type 2, CKD, vitamin D deficiency, and COPD.    Resident is seen today for a routine follow up visit.  He is out of bed in his wheelchair.  Alert and oriented x 3, able to answer questions appropriately.  Currently denies pain.  States he has been doing well.  Denies problems with bowel or bladder.  He has  been working with therapy and completing exercises with them.        Reviewed resident with nursing staff.  Reviewed recent labs, vitals and orders.           Nursing and prior provider notes reviewed on this visit. Discussed visit with PCP and nursing staff/ supervisor.      Review of Systems   Constitutional:  Positive for activity change. Negative for appetite change, fatigue and fever.   HENT:  Positive for hearing loss. Negative for congestion and rhinorrhea.    Eyes:  Negative for pain and visual disturbance.   Respiratory:  Negative for cough and shortness of breath.    Cardiovascular:  Positive for leg swelling. Negative for chest pain.   Gastrointestinal:  Negative for abdominal pain and constipation.   Genitourinary:  Negative for difficulty urinating and dysuria.   Musculoskeletal:  Positive for gait problem. Negative for arthralgias.   Skin:  Negative for color change and rash.   Neurological:  Negative for dizziness, syncope and weakness.   Psychiatric/Behavioral:  Negative for behavioral problems and confusion.    All other systems reviewed and are negative.         ALLERGY: Reviewed, unchanged  Allergies   Allergen Reactions    Abciximab Other (See Comments)     1/97 rec'd no rxn; Pt unsure of any reaction      Medical Tape        HISTORY:  Medical Hx: Reviewed, unchanged  Family Hx: Reviewed, unchanged  Soc Hx: Reviewed,  unchanged      Physical Exam  Vitals reviewed.   Constitutional:       General: He is not in acute distress.     Appearance: Normal appearance. He is obese. He is not ill-appearing.   HENT:      Head: Normocephalic.      Right Ear: Tympanic membrane normal.      Left Ear: Tympanic membrane normal.      Nose: Nose normal. No congestion.      Mouth/Throat:      Mouth: Mucous membranes are moist.      Pharynx: Oropharynx is clear.   Eyes:      General:         Right eye: No discharge.         Left eye: No discharge.      Extraocular Movements: Extraocular movements intact.       "Conjunctiva/sclera: Conjunctivae normal.      Pupils: Pupils are equal, round, and reactive to light.   Cardiovascular:      Rate and Rhythm: Normal rate and regular rhythm.      Pulses: Normal pulses.      Heart sounds: Normal heart sounds.   Pulmonary:      Effort: Pulmonary effort is normal. No respiratory distress.      Breath sounds: Normal breath sounds.   Chest:      Chest wall: No tenderness.   Abdominal:      General: Bowel sounds are normal.      Palpations: Abdomen is soft.      Tenderness: There is no abdominal tenderness.   Musculoskeletal:         General: Tenderness present. No swelling. Normal range of motion.      Cervical back: Normal range of motion.      Right lower leg: Edema present.      Left lower leg: Edema present.   Skin:     General: Skin is warm and dry.   Neurological:      Mental Status: He is alert and oriented to person, place, and time. Mental status is at baseline.      Motor: No weakness.   Psychiatric:         Mood and Affect: Mood normal.         Behavior: Behavior normal.         Thought Content: Thought content normal.            Laboratory results / Imaging reviewed: Hard copy/ies in medical chart:    Vitals:    01/18/24 1802   BP: 122/74   Pulse: 72   Resp: 16   Temp: 98.1 °F (36.7 °C)   SpO2: 95%       Current Medications:  All medications reviewed and updated in Nursing Home Chart    Please note: This note was completed in part utilizing a voice-recognition software may have been used in the preparation of this document. Grammatical errors, random word insertion, spelling mistakes, and incomplete sentences may be an occasional consequence of the system secondary to software limitations, ambient noise and hardware issues. Occasional wrong word or \"sound-alike\" substitutions may have occurred due to the inherent limitations of voice recognition software. At the time of dictation, efforts were made to edit, clarify and/or correct errors. Interpretation should be guided by " context. Please read the chart carefully and recognize, using context, where substitutions have occurred. If you have any questions or concerns about the context, text or information contained within the body of this dictation, please contact myself, the provider, for further clarification.      VICKI Elizabeth  1/18/2024

## 2024-01-18 NOTE — ASSESSMENT & PLAN NOTE
Lab Results   Component Value Date    HGBA1C 6.0 (H) 08/24/2023     Goal HgA1c in geriatric population is > 7.5 to avoid hypoglycemia  Continue to monitor HgA1c Q6 months  Continue insulin glargine 25 units SQ daily

## 2024-01-18 NOTE — ASSESSMENT & PLAN NOTE
Pain controlled  Moving around unit well in wheelchair  Seen working with PT/OT completing exercises

## 2024-02-06 ENCOUNTER — NURSING HOME VISIT (OUTPATIENT)
Dept: GERIATRICS | Facility: OTHER | Age: 79
End: 2024-02-06
Payer: COMMERCIAL

## 2024-02-06 DIAGNOSIS — D63.8 ANEMIA, CHRONIC DISEASE: ICD-10-CM

## 2024-02-06 DIAGNOSIS — I10 ESSENTIAL HYPERTENSION: ICD-10-CM

## 2024-02-06 DIAGNOSIS — I50.32 CHRONIC DIASTOLIC HEART FAILURE (HCC): Primary | ICD-10-CM

## 2024-02-06 DIAGNOSIS — Z79.4 TYPE 2 DIABETES MELLITUS WITH STAGE 3B CHRONIC KIDNEY DISEASE, WITH LONG-TERM CURRENT USE OF INSULIN (HCC): ICD-10-CM

## 2024-02-06 DIAGNOSIS — R26.2 AMBULATORY DYSFUNCTION: ICD-10-CM

## 2024-02-06 DIAGNOSIS — J44.9 CHRONIC OBSTRUCTIVE PULMONARY DISEASE, UNSPECIFIED COPD TYPE (HCC): ICD-10-CM

## 2024-02-06 DIAGNOSIS — K21.9 GASTROESOPHAGEAL REFLUX DISEASE, UNSPECIFIED WHETHER ESOPHAGITIS PRESENT: ICD-10-CM

## 2024-02-06 DIAGNOSIS — N18.32 STAGE 3B CHRONIC KIDNEY DISEASE (CKD) (HCC): ICD-10-CM

## 2024-02-06 DIAGNOSIS — K59.09 CHRONIC CONSTIPATION: ICD-10-CM

## 2024-02-06 DIAGNOSIS — E11.22 TYPE 2 DIABETES MELLITUS WITH STAGE 3B CHRONIC KIDNEY DISEASE, WITH LONG-TERM CURRENT USE OF INSULIN (HCC): ICD-10-CM

## 2024-02-06 DIAGNOSIS — N18.32 TYPE 2 DIABETES MELLITUS WITH STAGE 3B CHRONIC KIDNEY DISEASE, WITH LONG-TERM CURRENT USE OF INSULIN (HCC): ICD-10-CM

## 2024-02-06 DIAGNOSIS — Z71.89 ADVANCE CARE PLANNING: ICD-10-CM

## 2024-02-06 PROCEDURE — 99309 SBSQ NF CARE MODERATE MDM 30: CPT | Performed by: STUDENT IN AN ORGANIZED HEALTH CARE EDUCATION/TRAINING PROGRAM

## 2024-02-07 PROBLEM — K59.09 CHRONIC CONSTIPATION: Status: ACTIVE | Noted: 2024-02-07

## 2024-02-07 PROBLEM — R26.2 AMBULATORY DYSFUNCTION: Status: ACTIVE | Noted: 2024-02-07

## 2024-02-07 PROBLEM — N18.32 STAGE 3B CHRONIC KIDNEY DISEASE (CKD) (HCC): Status: ACTIVE | Noted: 2024-02-07

## 2024-02-07 PROBLEM — Z71.89 ADVANCE CARE PLANNING: Status: ACTIVE | Noted: 2024-02-07

## 2024-02-07 PROBLEM — D63.8 ANEMIA, CHRONIC DISEASE: Status: ACTIVE | Noted: 2024-02-07

## 2024-02-07 NOTE — ASSESSMENT & PLAN NOTE
Likely multifactorial in setting of CKD and suspect iron deficiency  Continues on iron supplementation  -monitor on routine labs  -monitor for acute bleed - no present signs  -consider further workup, Heme consult if persistent/worsening  -transfuse PRN Hb <7

## 2024-02-07 NOTE — ASSESSMENT & PLAN NOTE
At risk due to hospitalization, relative immobility, comorbidities including diabetes, iron supplement  Monitor stool output - Patient reports generally daily BM recently stable but can be hard at times  Bowel regimen at facility as ordered; increase senna-S to 2 tabs; consider bisacodyl suppository PRN if no BM in 2-3 days  Encourage mobility as tolerated, PO hydration as appropriate, high fiber diet/prune juice (in outpatient setting as appropriate)  Goal is for 1 easy BM every 1-2 days

## 2024-02-07 NOTE — ASSESSMENT & PLAN NOTE
Patient verbalizes HCP as son Luis Adair and alternate as wife Bolivar Najera  Extensive discussion/education with patient today regarding their wishes with respect to resuscitative measures; discussed potential risks vs benefits of resuscitative measures; patient verbalizes understanding, appears to have capacity to make this decision presently, and clearly verbalizes a desire for DNR/DNI in accordance with prior

## 2024-02-07 NOTE — ASSESSMENT & PLAN NOTE
Mainly uses wheelchair, rollator at baseline  Denies recent falls  -PT/OT as appropriate  -fall precautions  -encourage appropriate DME use

## 2024-02-07 NOTE — ASSESSMENT & PLAN NOTE
Lab Results   Component Value Date    EGFR 45 (L) 10/15/2023    EGFR 53 (L) 10/13/2023    EGFR 46 (L) 06/27/2023    CREATININE 1.54 (H) 10/15/2023    CREATININE 1.35 (H) 10/13/2023    CREATININE 1.52 (H) 06/27/2023     On lab review recent Cr 1.63 (baseline seems 1.5-1.8), eGFR 42 (baseline seems 30s-40s)  Seems consistent with CKD3b likely in setting of diabetes  Monitor renal function on routine labs - next ordered in Feb 2024  Avoid nephrotoxins, NSAIDs as able  Encourage PO hydration, respecting volume status

## 2024-02-07 NOTE — ASSESSMENT & PLAN NOTE
Monitor BP - generally controlled/stable 110s-130s systolic  No acute cardiac complaints  Avoid hypotension  Continue regimen including carvedilol, terazosin, torsemide  Consider adjusting/weaning regimen as appropriate

## 2024-02-07 NOTE — ASSESSMENT & PLAN NOTE
Lab Results   Component Value Date    HGBA1C 6.0 (H) 08/24/2023     Monitor glucose - generally controlled in 100-200 range  Avoid hypoglycemia - generally very rare readings below 100 recently  Continue insulin glargine 25u daily, consider weaning if glucose remains well controlled  Monitor A1c routinely, next ordered Feb 2024

## 2024-02-07 NOTE — ASSESSMENT & PLAN NOTE
Seems to be quite mild, patient denies any recent breathing concerns  Has not been requiring any breathing treatments/inhalers  Continue to monitor  10/2013 outpatient PFTs reportedly showed mild obstruction  Consider outpatient PFTs with pulmonary as appropriate

## 2024-02-07 NOTE — ASSESSMENT & PLAN NOTE
Wt Readings from Last 3 Encounters:   01/18/24 100 kg (221 lb)   12/26/23 101 kg (222 lb 3.2 oz)   12/17/23 118 kg (259 lb 4.8 oz)     strike-out   2/6/2024 09:33 215.6 Lbs Standing amarrero (Manual)    strike-out   2/6/2024 09:22 215.6 Lbs Standing lborchick (Manual)    strike-out   2/6/2024 09:22 215.6 Lbs Standing lborchick (Manual)    strike-out   2/5/2024 15:09 219.1 Lbs Standing lborchick (Manual)    strike-out   2/5/2024 15:08 219.1 Lbs Standing lborchick (Manual)    strike-out   2/5/2024 14:15 219.1 Lbs Standing amarrero (Manual)    strike-out   2/4/2024 12:39 219.0 Lbs Standing mkeslin (Manual)    strike-out   2/3/2024 14:47 222.0 Lbs Standing sedwards (Manual)    strike-out   2/2/2024 11:15 222.5 Lbs Standing bfioranelli (Manual)    strike-out   2/1/2024 17:24 223.3 Lbs Standing sgallin (Manual)    strike-out   2/1/2024 14:19 222.5 Lbs Standing bfioranelli (Manual)    strike-out   2/1/2024 12:37 222.5 Lbs Standing gsquires (Manual)    strike-out   2/1/2024 12:36 222.5 Lbs Standing gsquires (Manual)    strike-out   1/31/2024 13:44 222.6 Lbs Standing sfigueroa (Manual)    strike-out   1/30/2024 10:56 222.7 Lbs Standing lluna (Manual)    strike-out   1/30/2024 10:29 222.7 Lbs Standing khorner (Manual)    strike-out   1/30/2024 10:29 222.7 Lbs Standing khorner (Manual)    strike-out   1/29/2024 14:38 224.3 Lbs Wheelchair kzettlemoyer (Manual)    strike-out   1/29/2024 14:38 224.3 Lbs Wheelchair kzettlemoyer (Manual)    strike-out   1/29/2024 14:16 224.3 Lbs Standing hscontreras (Manual)    strike-out   1/28/2024 14:30 224.5 Lbs Standing amarrero (Manual)    strike-out   1/28/2024 13:57 220.2 Lbs Standing mmartinez2 (Manual)    strike-out   1/28/2024 13:57 220.2 Lbs Standing mmartinez2 (Manual)      Monitor daily weight - overall stable/downtrend, no alarming uptrend; weight decrease seems related to diuresis and improving peripheral edema recently, he is otherwise eating well  Baseline weight reported around  215-220 lb  Monitor volume status clinically  Encourage use of knee high TEDS stockings  Recently has been on torsemide 30mg AM and 20mg PM - at this time will decrease to 20mg BID to prevent over-diuresis, continue to monitor and adjust dose as appropriate

## 2024-02-07 NOTE — ASSESSMENT & PLAN NOTE
Stable per patient  -monitor off medications  -recommend OOB with meals, sit upright for at least 30 minutes afterwards, avoid trigger foods  -continue to monitor  -follow up with PCP, GI as appropriate

## 2024-02-07 NOTE — PROGRESS NOTES
[facility]  Facility: [facility]    PROGRESS NOTE  Nursing Home Place of Service: nursing home place of service: POS 32 Unskilled- No Part A Coverage    NAME: Laureano Adair  : 1945 AGE: 78 y.o. SEX: male MRN: [id number]  DATE OF ENCOUNTER: 24    Assessment and Plan     Problem List Items Addressed This Visit       Chronic diastolic heart failure (HCC) - Primary     Wt Readings from Last 3 Encounters:   24 100 kg (221 lb)   23 101 kg (222 lb 3.2 oz)   23 118 kg (259 lb 4.8 oz)     strike-out   2024 09:33 215.6 Lbs Standing amarrero (Manual)    strike-out   2024 09:22 215.6 Lbs Standing lborchick (Manual)    strike-out   2024 09:22 215.6 Lbs Standing lborchick (Manual)    strike-out   2024 15:09 219.1 Lbs Standing lborchick (Manual)    strike-out   2024 15:08 219.1 Lbs Standing lborchick (Manual)    strike-out   2024 14:15 219.1 Lbs Standing amarrero (Manual)    strike-out   2024 12:39 219.0 Lbs Standing mkeslin (Manual)    strike-out   2/3/2024 14:47 222.0 Lbs Standing sedwards (Manual)    strike-out   2024 11:15 222.5 Lbs Standing bfioranelli (Manual)    strike-out   2024 17:24 223.3 Lbs Standing sgallin (Manual)    strike-out   2024 14:19 222.5 Lbs Standing bfioranelli (Manual)    strike-out   2024 12:37 222.5 Lbs Standing gsquires (Manual)    strike-out   2024 12:36 222.5 Lbs Standing gsquires (Manual)    strike-out   2024 13:44 222.6 Lbs Standing sfigueroa (Manual)    strike-out   2024 10:56 222.7 Lbs Standing lluna (Manual)    strike-out   2024 10:29 222.7 Lbs Standing khorner (Manual)    strike-out   2024 10:29 222.7 Lbs Standing khorner (Manual)    strike-out   2024 14:38 224.3 Lbs Wheelchair kzettlemoyer (Manual)    strike-out   2024 14:38 224.3 Lbs Wheelchair kzettlemoyer (Manual)    strike-out   2024 14:16 224.3 Lbs Standing hscontreras (Manual)    strike-out   2024  14:30 224.5 Lbs Standing amarrero (Manual)    strike-out   1/28/2024 13:57 220.2 Lbs Standing mmartinez2 (Manual)    strike-out   1/28/2024 13:57 220.2 Lbs Standing mmartinez2 (Manual)      Monitor daily weight - overall stable/downtrend, no alarming uptrend; weight decrease seems related to diuresis and improving peripheral edema recently, he is otherwise eating well  Baseline weight reported around 215-220 lb  Monitor volume status clinically  Encourage use of knee high TEDS stockings  Recently has been on torsemide 30mg AM and 20mg PM - at this time will decrease to 20mg BID to prevent over-diuresis, continue to monitor and adjust dose as appropriate                   Chronic obstructive pulmonary disease (HCC)     Seems to be quite mild, patient denies any recent breathing concerns  Has not been requiring any breathing treatments/inhalers  Continue to monitor  10/2013 outpatient PFTs reportedly showed mild obstruction  Consider outpatient PFTs with pulmonary as appropriate         Essential hypertension     Monitor BP - generally controlled/stable 110s-130s systolic  No acute cardiac complaints  Avoid hypotension  Continue regimen including carvedilol, terazosin, torsemide  Consider adjusting/weaning regimen as appropriate         GERD (gastroesophageal reflux disease)     Stable per patient  -monitor off medications  -recommend OOB with meals, sit upright for at least 30 minutes afterwards, avoid trigger foods  -continue to monitor  -follow up with PCP, GI as appropriate           Type 2 diabetes mellitus ([facility])       Lab Results   Component Value Date    HGBA1C 6.0 (H) 08/24/2023     Monitor glucose - generally controlled in 100-200 range  Avoid hypoglycemia - generally very rare readings below 100 recently  Continue insulin glargine 25u daily, consider weaning if glucose remains well controlled  Monitor A1c routinely, next ordered Feb 2024         Stage 3b chronic kidney disease (CKD) ([facility])     Lab Results    Component Value Date    EGFR 45 (L) 10/15/2023    EGFR 53 (L) 10/13/2023    EGFR 46 (L) 06/27/2023    CREATININE 1.54 (H) 10/15/2023    CREATININE 1.35 (H) 10/13/2023    CREATININE 1.52 (H) 06/27/2023     On lab review recent Cr 1.63 (baseline seems 1.5-1.8), eGFR 42 (baseline seems 30s-40s)  Seems consistent with CKD3b likely in setting of diabetes  Monitor renal function on routine labs - next ordered in Feb 2024  Avoid nephrotoxins, NSAIDs as able  Encourage PO hydration, respecting volume status           Ambulatory dysfunction     Mainly uses wheelchair, rollator at baseline  Denies recent falls  -PT/OT as appropriate  -fall precautions  -encourage appropriate DME use             Chronic constipation       At risk due to hospitalization, relative immobility, comorbidities including diabetes, iron supplement  Monitor stool output - Patient reports generally daily BM recently stable but can be hard at times  Bowel regimen at facility as ordered; increase senna-S to 2 tabs; consider bisacodyl suppository PRN if no BM in 2-3 days  Encourage mobility as tolerated, PO hydration as appropriate, high fiber diet/prune juice (in outpatient setting as appropriate)  Goal is for 1 easy BM every 1-2 days           Advance care planning     Patient verbalizes HCP as son Luis Adair and alternate as wife Bolivar Najera  Extensive discussion/education with patient today regarding their wishes with respect to resuscitative measures; discussed potential risks vs benefits of resuscitative measures; patient verbalizes understanding, appears to have capacity to make this decision presently, and clearly verbalizes a desire for DNR/DNI in accordance with prior         Anemia, chronic disease     Likely multifactorial in setting of CKD and suspect iron deficiency  Continues on iron supplementation  -monitor on routine labs  -monitor for acute bleed - no present signs  -consider further workup, Heme consult if  persistent/worsening  -transfuse PRN Hb <7                  Chief Complaint     Follow up 60 day    History of Present Illness     Laureano Adair is a 78 y.o. male who was seen today for follow up. PMH including CHF, atherosclerotic heart disease, hypertension, BPH, DM type 2, CKD, vitamin D deficiency, and COPD     Patient seen and examined in room  Others present: none  Patient seated in chair (wheelchair) watching TV  Appears comfortable, awake, alert, oriented to situation, able to converse appropriately  Patient polite, in good spirits, Aox3, notes he has no acute concerns and feels OK overall recently. Periphral edema he feels has been improving recently. Breathing fine. Appetite good, denies swallowing issues, no abd pain/N/V. Last BM today, reports generally having daily BM, at times can be a bit hard. No acute pain anywhere. No acute cardiopulmonary, abdominal, or urinary symptoms; see ROS for more details. Generally gets around using wheelchair or rollator and denies recent falls.    No further questions or acute concerns identified.  No acute concerns per staff/nursing.    Lab Review:   12/18/23: CBC with Hb 9.5; CMP with GFR 51; uric acid 9.4; TSH WNL; A1c from Aug 2023 was 6.0  1/3: BMP with Cr 1.63 (baseline seems 1.5-1.8), eGFR 42 (baseline seems 30s-40s)        Echo Oct 2023: difficult study, EF not reported, systolic function overall preserved, indeterminate diastolic dysfunction    The following portions of the patient's history were reviewed and updated as appropriate: allergies, current medications, past family history, past medical history, past social history, past surgical history and problem list.    Review of Systems     Review of Systems   Constitutional:  Negative for appetite change, chills, diaphoresis and fever.   HENT:  Positive for hearing loss. Negative for drooling, ear pain, rhinorrhea, sore throat and trouble swallowing.    Eyes:  Negative for photophobia, pain, discharge,  redness, itching and visual disturbance.   Respiratory:  Negative for cough, chest tightness, shortness of breath and wheezing.    Cardiovascular:  Positive for leg swelling. Negative for chest pain and palpitations.   Gastrointestinal:  Negative for abdominal pain, blood in stool, diarrhea and vomiting.   Genitourinary:  Negative for difficulty urinating, dysuria, flank pain and hematuria.   Musculoskeletal:  Positive for gait problem. Negative for arthralgias.   Skin:  Negative for color change.   Neurological:  Negative for dizziness, tremors, seizures, facial asymmetry and headaches.   Psychiatric/Behavioral:  Negative for agitation, behavioral problems and confusion. The patient is not nervous/anxious and is not hyperactive.        Active Problem List     Patient Active Problem List   Diagnosis    Atrial flutter (HCC)    Chronic diastolic heart failure (HCC)    Chronic obstructive pulmonary disease (HCC)    Essential hypertension    GERD (gastroesophageal reflux disease)    Obstructive sleep apnea    Type 2 diabetes mellitus (HCC)    Chronic right-sided low back pain without sciatica    Dry eyes due to decreased tear production    Hyponatremia    Morbid obesity (HCC)    Myocardial injury    Secondary hyperparathyroidism (HCC)    Stage 3a chronic kidney disease (HCC)    Thrombocytopenia (HCC)    Left wrist pain    Stage 3b chronic kidney disease (CKD) (HCC)    Ambulatory dysfunction    Chronic constipation    Advance care planning    Anemia, chronic disease       Objective     Vital Signs:     BP:   1/31/2024 08:09 116 / 58 mmHg    Temp:98.1 °F  12/10/2023 11:50 Pulse:  1/31/2024 08:13 75 bpm      Weight:215.6 Lbs  2/6/2024 09:33   Resp:16 Breaths/min  12/10/2023 11:50 BS:  2/6/2024 07:51 99.0 mg/dL    O2:95 %  12/10/2023 11:50 Pain:0         Physical Exam  Vitals reviewed.   Constitutional:       General: He is not in acute distress.     Appearance: He is not toxic-appearing or diaphoretic.   HENT:      Head:  Normocephalic and atraumatic.      Right Ear: External ear normal.      Left Ear: External ear normal.      Nose: Nose normal. No rhinorrhea.      Mouth/Throat:      Mouth: Mucous membranes are moist.      Pharynx: Oropharynx is clear. No oropharyngeal exudate or posterior oropharyngeal erythema.   Eyes:      General: No scleral icterus.        Right eye: No discharge.         Left eye: No discharge.      Extraocular Movements: Extraocular movements intact.      Conjunctiva/sclera: Conjunctivae normal.      Pupils: Pupils are equal, round, and reactive to light.   Cardiovascular:      Rate and Rhythm: Normal rate and regular rhythm.   Pulmonary:      Effort: Pulmonary effort is normal. No respiratory distress.      Breath sounds: No wheezing.   Abdominal:      General: Bowel sounds are normal.      Palpations: Abdomen is soft.      Tenderness: There is no abdominal tenderness. There is no guarding.   Musculoskeletal:         General: Swelling present. No tenderness.      Cervical back: No rigidity.      Comments: 1-2+ bilateral lower extremity edema with dry/flaky skin, no noted open wound/active drainage, has compression/tube socks   Skin:     General: Skin is warm and dry.      Coloration: Skin is not jaundiced.   Neurological:      General: No focal deficit present.      Mental Status: He is alert and oriented to person, place, and time. Mental status is at baseline.   Psychiatric:         Mood and Affect: Mood normal.         Behavior: Behavior normal.         Thought Content: Thought content normal.         Judgment: Judgment normal.         Pertinent Laboratory/Diagnostic Studies:  Laboratory and Imaging studies reviewed. Full report in the paper chart.     Current Medications   Medications reviewed and updated in facility chart.      -Total time spent on this encounter today including documentation and workup review, face to face time, history and exam, and documentation/orders was approximately 40  minutes.  -This note will be copied to [facility] EMR where vitals and medication orders are placed.    Raúl Clemente D.O.  Geriatric Medicine  2/7/2024 10:11 AM

## 2024-02-12 PROBLEM — H04.129 DRY EYES DUE TO DECREASED TEAR PRODUCTION: Status: RESOLVED | Noted: 2021-11-23 | Resolved: 2024-02-12

## 2024-03-05 ENCOUNTER — NURSING HOME VISIT (OUTPATIENT)
Dept: GERIATRICS | Facility: OTHER | Age: 79
End: 2024-03-05
Payer: COMMERCIAL

## 2024-03-05 DIAGNOSIS — Z79.4 TYPE 2 DIABETES MELLITUS WITH STAGE 3B CHRONIC KIDNEY DISEASE, WITH LONG-TERM CURRENT USE OF INSULIN (HCC): ICD-10-CM

## 2024-03-05 DIAGNOSIS — R26.2 AMBULATORY DYSFUNCTION: ICD-10-CM

## 2024-03-05 DIAGNOSIS — I10 ESSENTIAL HYPERTENSION: ICD-10-CM

## 2024-03-05 DIAGNOSIS — I50.32 CHRONIC DIASTOLIC HEART FAILURE (HCC): Primary | ICD-10-CM

## 2024-03-05 DIAGNOSIS — N18.32 STAGE 3B CHRONIC KIDNEY DISEASE (CKD) (HCC): ICD-10-CM

## 2024-03-05 DIAGNOSIS — J44.9 CHRONIC OBSTRUCTIVE PULMONARY DISEASE, UNSPECIFIED COPD TYPE (HCC): ICD-10-CM

## 2024-03-05 DIAGNOSIS — E11.22 TYPE 2 DIABETES MELLITUS WITH STAGE 3B CHRONIC KIDNEY DISEASE, WITH LONG-TERM CURRENT USE OF INSULIN (HCC): ICD-10-CM

## 2024-03-05 DIAGNOSIS — N18.32 TYPE 2 DIABETES MELLITUS WITH STAGE 3B CHRONIC KIDNEY DISEASE, WITH LONG-TERM CURRENT USE OF INSULIN (HCC): ICD-10-CM

## 2024-03-05 PROCEDURE — 99309 SBSQ NF CARE MODERATE MDM 30: CPT

## 2024-03-17 VITALS
WEIGHT: 216.6 LBS | DIASTOLIC BLOOD PRESSURE: 50 MMHG | SYSTOLIC BLOOD PRESSURE: 106 MMHG | TEMPERATURE: 97.3 F | HEART RATE: 60 BPM | RESPIRATION RATE: 20 BRPM | OXYGEN SATURATION: 95 %

## 2024-03-18 NOTE — ASSESSMENT & PLAN NOTE
Lab Results   Component Value Date    HGBA1C 6.0 (H) 08/24/2023     HgA1c on 02/07-6.2  Well controlled based on co-morbidities   Continue to monitor HgA1c Q6 months  Continue insulin glargine 25 units SQ daily  
Blood pressures reviewed from facility records  Stable  Continue carvedilol 1.56 mg po BID  Continue terazosin 1 mg po daily at bedtime  Continue torsemide 20 mg po BID  Encourage medication adherence   Continue to monitor blood pressure weekly   Encourage heart healthy diet  Consult and follow up with cardiologist   
Continue PT/OT  Recently has been cleared by therapy to move around the facility more independently, has been seen walking with walker  Encourage use of assistive device  Continue fall precautions  Encourage patient to participate with activities  Provide education for patient, family, and caregivers    
Lab Results   Component Value Date    EGFR 61 10/17/2023    EGFR 63 10/16/2023    EGFR 45 (L) 10/15/2023    CREATININE 1.20 10/17/2023    CREATININE 1.17 10/16/2023    CREATININE 1.54 (H) 10/15/2023     Baseline appears to be 1.5-1.8  BMP on 02/21  Creatinine-1.65  eGFR-41  Monitor I/O's  Encourage PO hydration  Monitor for hypertension  Avoid hypotension  Monitor diabetes  Avoid nephrotoxins, NSAIDs  Repeat blood work   
No shortness of breath noted  Not requiring breathing treatments or inhalers  Follow up with pulmonology as needed  
Stable weight from admission date  Encourage use of knee high TEDS stockings  VSS  Continue torsemide 20 mg po BID  Continue salt restricted diet  Follow up with cardiologist   
mother

## 2024-03-18 NOTE — PROGRESS NOTES
[address], [address], [place] [address]  [phone]    NAME: Laureano Adair  AGE: 79 y.o. SEX: male    Progress Note    Location: [facility]  POS: 32 ([facility])  Code Status: DNR    Chief complaint / Reason for visit:  Follow-up visit    Assessment/Plan:    Ambulatory dysfunction  Continue PT/OT  Recently has been cleared by therapy to move around the facility more independently, has been seen walking with walker  Encourage use of assistive device  Continue fall precautions  Encourage patient to participate with activities  Provide education for patient, family, and caregivers      Stage 3b chronic kidney disease (CKD) ([facility])  Lab Results   Component Value Date    EGFR 61 10/17/2023    EGFR 63 10/16/2023    EGFR 45 (L) 10/15/2023    CREATININE 1.20 10/17/2023    CREATININE 1.17 10/16/2023    CREATININE 1.54 (H) 10/15/2023     Baseline appears to be 1.5-1.8  BMP on 02/21  Creatinine-1.65  eGFR-41  Monitor I/O's  Encourage PO hydration  Monitor for hypertension  Avoid hypotension  Monitor diabetes  Avoid nephrotoxins, NSAIDs  Repeat blood work     Type 2 diabetes mellitus (HCC)    Lab Results   Component Value Date    HGBA1C 6.0 (H) 08/24/2023     HgA1c on 02/07-6.2  Well controlled based on co-morbidities   Continue to monitor HgA1c Q6 months  Continue insulin glargine 25 units SQ daily    Chronic obstructive pulmonary disease (HCC)  No shortness of breath noted  Not requiring breathing treatments or inhalers  Follow up with pulmonology as needed    Chronic diastolic heart failure (HCC)  Stable weight from admission date  Encourage use of knee high TEDS stockings  VSS  Continue torsemide 20 mg po BID  Continue salt restricted diet  Follow up with cardiologist     Essential hypertension  Blood pressures reviewed from facility records  Stable  Continue carvedilol 1.56 mg po BID  Continue terazosin 1 mg po daily at bedtime  Continue torsemide 20 mg po BID  Encourage medication  adherence   Continue to monitor blood pressure weekly   Encourage heart healthy diet  Consult and follow up with cardiologist       This is an 79 y.o. male seen today at [facility].  Medical history includes, not limited to, CHF, atherosclerotic heart disease, hypertension, BPH, DM type 2, CKD, vitamin D deficiency, and COPD.    Resident is seen today for a routine follow up visit.  He is out of bed in his wheelchair.  Alert and oriented x 3, able to answer questions appropriately.  Currently denies pain.  States he has been doing well since getting his recliner.  He has been sleeping better, sleep through the night, able to elevate his legs more.  His leg have decreased edema.  Spoke to him regarding a follow up appointment with a Cardiologist.  He states he has not gone in a couple of years.  He is not sure if the Cardiologist he went to is still in practice.        Reviewed resident with nursing staff.  Reviewed recent labs, vitals and orders.           Nursing and prior provider notes reviewed on this visit. Discussed visit with PCP and nursing staff/ supervisor.      Review of Systems   Constitutional:  Positive for activity change. Negative for appetite change, fatigue and fever.   HENT:  Positive for hearing loss. Negative for congestion and rhinorrhea.    Eyes:  Negative for pain and visual disturbance.   Respiratory:  Negative for cough and shortness of breath.    Cardiovascular:  Positive for leg swelling. Negative for chest pain.   Gastrointestinal:  Negative for abdominal pain and constipation.   Genitourinary:  Negative for difficulty urinating and dysuria.   Musculoskeletal:  Positive for gait problem. Negative for arthralgias.   Skin:  Negative for color change and rash.   Neurological:  Negative for dizziness, syncope and weakness.   Psychiatric/Behavioral:  Negative for behavioral problems and confusion.    All other systems reviewed and are negative.         ALLERGY: Reviewed,  unchanged  Allergies   Allergen Reactions    Abciximab Other (See Comments)     1/97 rec'd no rxn; Pt unsure of any reaction      Medical Tape        HISTORY:  Medical Hx: Reviewed, unchanged  Family Hx: Reviewed, unchanged  Soc Hx: Reviewed,  unchanged      Physical Exam  Vitals reviewed.   Constitutional:       General: He is not in acute distress.     Appearance: Normal appearance. He is obese. He is not ill-appearing.   HENT:      Head: Normocephalic.      Right Ear: Tympanic membrane normal.      Left Ear: Tympanic membrane normal.      Nose: Nose normal. No congestion.      Mouth/Throat:      Mouth: Mucous membranes are moist.      Pharynx: Oropharynx is clear.   Eyes:      General:         Right eye: No discharge.         Left eye: No discharge.      Extraocular Movements: Extraocular movements intact.      Conjunctiva/sclera: Conjunctivae normal.      Pupils: Pupils are equal, round, and reactive to light.   Cardiovascular:      Rate and Rhythm: Normal rate and regular rhythm.      Pulses: Normal pulses.      Heart sounds: Normal heart sounds.   Pulmonary:      Effort: Pulmonary effort is normal. No respiratory distress.      Breath sounds: Normal breath sounds.   Chest:      Chest wall: No tenderness.   Abdominal:      General: Bowel sounds are normal.      Palpations: Abdomen is soft.      Tenderness: There is no abdominal tenderness.   Musculoskeletal:         General: No swelling. Normal range of motion.      Cervical back: Normal range of motion.      Right lower leg: Edema present.      Left lower leg: Edema present.   Skin:     General: Skin is warm and dry.   Neurological:      Mental Status: He is alert and oriented to person, place, and time. Mental status is at baseline.      Motor: No weakness.   Psychiatric:         Mood and Affect: Mood normal.         Behavior: Behavior normal.         Thought Content: Thought content normal.            Laboratory results / Imaging reviewed: Hard copy/ies in  "medical chart:    Vitals:    03/17/24 2026   BP: 106/50   Pulse: 60   Resp: 20   Temp: (!) 97.3 °F (36.3 °C)   SpO2: 95%       Current Medications:  All medications reviewed and updated in Nursing Home Chart    Please note: This note was completed in part utilizing a voice-recognition software may have been used in the preparation of this document. Grammatical errors, random word insertion, spelling mistakes, and incomplete sentences may be an occasional consequence of the system secondary to software limitations, ambient noise and hardware issues. Occasional wrong word or \"sound-alike\" substitutions may have occurred due to the inherent limitations of voice recognition software. At the time of dictation, efforts were made to edit, clarify and/or correct errors. Interpretation should be guided by context. Please read the chart carefully and recognize, using context, where substitutions have occurred. If you have any questions or concerns about the context, text or information contained within the body of this dictation, please contact myself, the provider, for further clarification.      VICKI Elizabeth  3/17/2024  "

## 2024-04-03 ENCOUNTER — NURSING HOME VISIT (OUTPATIENT)
Dept: GERIATRICS | Facility: OTHER | Age: 79
End: 2024-04-03
Payer: COMMERCIAL

## 2024-04-03 DIAGNOSIS — I50.32 CHRONIC DIASTOLIC HEART FAILURE (HCC): Primary | ICD-10-CM

## 2024-04-03 DIAGNOSIS — I25.10 CORONARY ARTERY DISEASE INVOLVING NATIVE CORONARY ARTERY OF NATIVE HEART WITHOUT ANGINA PECTORIS: ICD-10-CM

## 2024-04-03 DIAGNOSIS — K21.9 GASTROESOPHAGEAL REFLUX DISEASE, UNSPECIFIED WHETHER ESOPHAGITIS PRESENT: ICD-10-CM

## 2024-04-03 DIAGNOSIS — D63.8 ANEMIA, CHRONIC DISEASE: ICD-10-CM

## 2024-04-03 DIAGNOSIS — N18.32 TYPE 2 DIABETES MELLITUS WITH STAGE 3B CHRONIC KIDNEY DISEASE, WITH LONG-TERM CURRENT USE OF INSULIN (HCC): ICD-10-CM

## 2024-04-03 DIAGNOSIS — E11.22 TYPE 2 DIABETES MELLITUS WITH STAGE 3B CHRONIC KIDNEY DISEASE, WITH LONG-TERM CURRENT USE OF INSULIN (HCC): ICD-10-CM

## 2024-04-03 DIAGNOSIS — N18.32 STAGE 3B CHRONIC KIDNEY DISEASE (CKD) (HCC): ICD-10-CM

## 2024-04-03 DIAGNOSIS — I10 ESSENTIAL HYPERTENSION: ICD-10-CM

## 2024-04-03 DIAGNOSIS — K59.09 CHRONIC CONSTIPATION: ICD-10-CM

## 2024-04-03 DIAGNOSIS — Z71.89 ADVANCE CARE PLANNING: ICD-10-CM

## 2024-04-03 DIAGNOSIS — Z79.4 TYPE 2 DIABETES MELLITUS WITH STAGE 3B CHRONIC KIDNEY DISEASE, WITH LONG-TERM CURRENT USE OF INSULIN (HCC): ICD-10-CM

## 2024-04-03 DIAGNOSIS — J41.0 SIMPLE CHRONIC BRONCHITIS (HCC): ICD-10-CM

## 2024-04-03 DIAGNOSIS — F51.01 PRIMARY INSOMNIA: ICD-10-CM

## 2024-04-03 PROCEDURE — 99309 SBSQ NF CARE MODERATE MDM 30: CPT | Performed by: STUDENT IN AN ORGANIZED HEALTH CARE EDUCATION/TRAINING PROGRAM

## 2024-04-03 NOTE — PROGRESS NOTES
[facility]  Facility: [facility]    PROGRESS NOTE  Nursing Home Place of Service: nursing home place of service: POS 32 Unskilled- No Part A Coverage    NAME: Laureano Adair  : 1945 AGE: 79 y.o. SEX: male MRN: [id number]  DATE OF ENCOUNTER: 4/3/2024    Assessment and Plan     Problem List Items Addressed This Visit       Chronic diastolic heart failure (HCC) - Primary     Wt Readings from Last 3 Encounters:   24 98.2 kg (216 lb 9.6 oz)   24 100 kg (221 lb)   23 101 kg (222 lb 3.2 oz)       Monitor daily weight - recently stable, no alarming uptrend  Baseline weight reported around 215-220 lb  Monitor volume status clinically - stable  Encourage use of knee high TEDS stockings  Continue torsemide 20mg BID, continue to monitor and adjust dose as appropriate  Follow up with Cardiology, per discussion with team he is being scheduled, presently appears to have Cardiology appointment in 2024               Chronic obstructive pulmonary disease (HCC)     Seems to be quite mild, patient denies any recent breathing concerns  Has not been requiring any breathing treatments/inhalers  Continue to monitor  10/2013 outpatient PFTs reportedly showed mild obstruction  Consider outpatient PFTs with pulmonary as appropriate         Essential hypertension     Monitor BP - generally controlled/stable 100s-130s systolic  Pulse generally 60s-70s, sometimes borderline bradycardic  No acute cardiac complaints  Avoid hypotension  Continue regimen including carvedilol 1.56mg BID, terazosin 1mg, torsemide  Consider adjusting/weaning regimen as appropriate  Follow up with Cardiology         GERD (gastroesophageal reflux disease)     Stable per patient  -monitor off medications  -recommend OOB with meals, sit upright for at least 30 minutes afterwards, avoid trigger foods  -continue to monitor  -follow up with PCP, GI as appropriate         Type 2 diabetes mellitus (HCC)       Lab Results    Component Value Date    HGBA1C 6.0 (H) 08/24/2023     A1c very well controlled for age, most recently 6.2  Monitor glucose - generally controlled in 100-200 range  Avoid hypoglycemia - noted some fasting readings below 100  Continue insulin glargine - decrease from 25u to 23u daily, consider weaning further if glucose remains well controlled  Monitor A1c routinely         Stage 3b chronic kidney disease (CKD) ([facility])     Lab Results   Component Value Date    EGFR 61 10/17/2023    EGFR 63 10/16/2023    EGFR 45 (L) 10/15/2023    CREATININE 1.20 10/17/2023    CREATININE 1.17 10/16/2023    CREATININE 1.54 (H) 10/15/2023         eGFR (baseline seems 30s-40s)  Seems consistent with CKD3b likely in setting of diabetes  Monitor renal function on routine labs  Avoid nephrotoxins, NSAIDs as able  Encourage PO hydration, respecting volume status         Chronic constipation     At risk due to hospitalization, relative immobility, comorbidities including diabetes, iron supplement  Monitor stool output - Patient reports generally BM every other day recently stable but can be hard at times  Bowel regimen at facility as ordered; change miralax from PRN to scheduled daily; continue senna-S qHS; consider bisacodyl suppository PRN if no BM in 2-3 days  Encourage mobility as tolerated, PO hydration as appropriate, high fiber diet/prune juice (in outpatient setting as appropriate)  Goal is for 1 easy BM every 1-2 days         Advance care planning     Was discussed extensively at a prior visit 2/7/24  Briefly revisited today to confirm/evaluate for changes in patient's wishes  Patient confirms HCP as Luis Adair and alternate as wife Bolivar Najera  Patient confirms desire to remain DNR as per prior         Anemia, chronic disease     Likely multifactorial in setting of CKD and suspect iron deficiency  Continues on iron supplementation  -monitor on routine labs - stable  -monitor for acute bleed - no present signs  -consider further  workup, Heme consult if persistent/worsening  -transfuse PRN Hb <7         Primary insomnia     Stable, notes sleeping well lately  Maintained on trazodone 150mg and melatonin 5mg - will trial reduction of trazodone to 100mg  Encourage sleep hygiene  Continue to monitor         Coronary artery disease involving native coronary artery of native heart without angina pectoris     Per records, history of CAD resulting in an anterior wall myocardial infarction following which he underwent coronary artery bypass surgery in 1997  No acute cardiac complaints  Used to be on Xarelto, appears stopped due to hx of bleed per records  Continue aspirin, statin, carvedilol  Follow up with Cardiology                Chief Complaint     Follow up 60 day    History of Present Illness     Laureano Adair is a 79 y.o. male who was seen today for follow up. PMH including CHF, atherosclerotic heart disease, hypertension, BPH, DM type 2, CKD, vitamin D deficiency, and COPD      Patient seen and examined in common room  Others present for encounter: none  Patient seated in chair  Appears comfortable, awake, alert, oriented to situation, able to converse appropriately  Patient polite, in good spirits, Aox3. He notes he has no acute concerns and feels good overall recently. Periphral edema he feels has been stable and improved overall recently. Breathing fine, no orthopnea. Appetite good, in fact feels sometimes he does not get enough food to sate him, denies swallowing issues, no abd pain/N/V. Reports some concern of constipation, last BM 2 days ago, BM can be hard often, we discussed titration of bowel regimen. No acute pain anywhere, notes chronic bilateral knee pain (hx of bilat knee replacement) which is stable and he does not feel he needs anything more for it. No acute cardiopulmonary, abdominal, or urinary symptoms; see ROS for more details. Generally gets around using wheelchair or rollator and denies recent falls.   No further  questions or acute concerns identified.  No acute concerns per NP and nursing.    Lab Review:   12/18/23: CBC with Hb 9.5; CMP with GFR 51; uric acid 9.4; TSH WNL; A1c from Aug 2023 was 6.0  1/3/24: BMP with Cr 1.63 (baseline seems 1.5-1.8), eGFR 42 (baseline seems 30s-40s)  2/7/24: CBC with Hb 9.4 (stable, normocytic); BMP with Cr 1.78, eGFR 38; A1c 6.2  2/21/24: BMP with Cr 1.65, eGFR 41           Echo Oct 2023: difficult study, EF not reported, systolic function overall preserved, indeterminate diastolic dysfunction    The following portions of the patient's history were reviewed and updated as appropriate: allergies, current medications, past family history, past medical history, past social history, past surgical history and problem list.    Review of Systems     Review of Systems   Constitutional:  Negative for appetite change, chills, diaphoresis and fever.   HENT:  Positive for hearing loss (mild, chronic, stable). Negative for drooling, ear pain, rhinorrhea, sore throat and trouble swallowing.    Eyes:  Negative for pain, discharge, redness, itching and visual disturbance.   Respiratory:  Negative for cough, chest tightness, shortness of breath and wheezing.    Cardiovascular:  Positive for leg swelling (chronic, stable/improved lately). Negative for chest pain and palpitations.   Gastrointestinal:  Negative for abdominal pain, blood in stool, diarrhea and vomiting.   Genitourinary:  Negative for difficulty urinating, dysuria, flank pain and hematuria.   Musculoskeletal:  Positive for gait problem. Negative for arthralgias.   Skin:  Negative for color change.   Neurological:  Negative for dizziness, tremors, seizures, facial asymmetry and headaches.   Psychiatric/Behavioral:  Negative for agitation, behavioral problems and confusion. The patient is not nervous/anxious and is not hyperactive.        Active Problem List     Patient Active Problem List   Diagnosis    Atrial flutter (HCC)    Chronic diastolic  heart failure (HCC)    Chronic obstructive pulmonary disease (HCC)    Essential hypertension    GERD (gastroesophageal reflux disease)    Obstructive sleep apnea    Type 2 diabetes mellitus (HCC)    Chronic right-sided low back pain without sciatica    Hyponatremia    Morbid obesity (HCC)    Myocardial injury    Secondary hyperparathyroidism (HCC)    Stage 3a chronic kidney disease (HCC)    Thrombocytopenia (HCC)    Left wrist pain    Stage 3b chronic kidney disease (CKD) (HCC)    Ambulatory dysfunction    Chronic constipation    Advance care planning    Anemia, chronic disease    Primary insomnia    Coronary artery disease involving native coronary artery of native heart without angina pectoris       Objective     Vital Signs:     BP: 99/57 mmHg  4/3/2024 08:32 Temp:98.1 °F  3/22/2024 09:42 Pulse:66 bpm  4/3/2024 08:31   Weight:213.6 Lbs  4/3/2024 12:23   Resp:18 Breaths/min  3/22/2024 20:05 BS:118 mg/dL  4/3/2024 05:28 O2:98 %  3/22/2024 09:42 Pain:0  4/3/2024 17:19       Physical Exam  Vitals reviewed.   Constitutional:       General: He is not in acute distress.     Appearance: He is not toxic-appearing or diaphoretic.   HENT:      Head: Normocephalic and atraumatic.      Right Ear: External ear normal.      Left Ear: External ear normal.      Nose: Nose normal. No rhinorrhea.      Mouth/Throat:      Mouth: Mucous membranes are moist.      Pharynx: Oropharynx is clear. No oropharyngeal exudate or posterior oropharyngeal erythema.   Eyes:      General: No scleral icterus.        Right eye: No discharge.         Left eye: No discharge.      Extraocular Movements: Extraocular movements intact.      Conjunctiva/sclera: Conjunctivae normal.      Pupils: Pupils are equal, round, and reactive to light.   Cardiovascular:      Rate and Rhythm: Normal rate and regular rhythm.   Pulmonary:      Effort: Pulmonary effort is normal. No respiratory distress.      Breath sounds: No wheezing.   Abdominal:      General: Bowel  sounds are normal.      Palpations: Abdomen is soft.      Tenderness: There is no abdominal tenderness. There is no guarding.   Musculoskeletal:         General: Swelling present. No tenderness.      Cervical back: No rigidity.      Comments: 1-2+ bilateral lower extremity edema (appears stable/improved from prior), no noted open wound/active drainage   Skin:     General: Skin is warm and dry.      Coloration: Skin is not jaundiced.   Neurological:      General: No focal deficit present.      Mental Status: He is alert and oriented to person, place, and time. Mental status is at baseline.   Psychiatric:         Mood and Affect: Mood normal.         Behavior: Behavior normal.         Thought Content: Thought content normal.         Judgment: Judgment normal.         Pertinent Laboratory/Diagnostic Studies:  Laboratory and Imaging studies reviewed. Full report in the paper chart.     Current Medications   Medications reviewed and updated in facility chart.      -Total time spent on this encounter today including documentation and workup review, face to face time, history and exam, and documentation/orders was approximately 40 minutes.  -This note will be copied to [facility] EMR where vitals and medication orders are placed.    Raúl lCemente D.O.  Geriatric Medicine  4/3/2024 9:42 PM

## 2024-04-04 NOTE — ASSESSMENT & PLAN NOTE
Monitor BP - generally controlled/stable 100s-130s systolic  Pulse generally 60s-70s, sometimes borderline bradycardic  No acute cardiac complaints  Avoid hypotension  Continue regimen including carvedilol 1.56mg BID, terazosin 1mg, torsemide  Consider adjusting/weaning regimen as appropriate  Follow up with Cardiology

## 2024-04-04 NOTE — ASSESSMENT & PLAN NOTE
Per records, history of CAD resulting in an anterior wall myocardial infarction following which he underwent coronary artery bypass surgery in 1997  No acute cardiac complaints  Used to be on Xarelto, appears stopped due to hx of bleed per records  Continue aspirin, statin, carvedilol  Follow up with Cardiology

## 2024-04-04 NOTE — ASSESSMENT & PLAN NOTE
At risk due to hospitalization, relative immobility, comorbidities including diabetes, iron supplement  Monitor stool output - Patient reports generally BM every other day recently stable but can be hard at times  Bowel regimen at facility as ordered; change miralax from PRN to scheduled daily; continue senna-S qHS; consider bisacodyl suppository PRN if no BM in 2-3 days  Encourage mobility as tolerated, PO hydration as appropriate, high fiber diet/prune juice (in outpatient setting as appropriate)  Goal is for 1 easy BM every 1-2 days

## 2024-04-04 NOTE — ASSESSMENT & PLAN NOTE
Was discussed extensively at a prior visit 2/7/24  Briefly revisited today to confirm/evaluate for changes in patient's wishes  Patient confirms HCP as Luis Adair and alternate as wife Bolivar Najera  Patient confirms desire to remain DNR as per prior

## 2024-04-04 NOTE — ASSESSMENT & PLAN NOTE
Likely multifactorial in setting of CKD and suspect iron deficiency  Continues on iron supplementation  -monitor on routine labs - stable  -monitor for acute bleed - no present signs  -consider further workup, Heme consult if persistent/worsening  -transfuse PRN Hb <7

## 2024-04-04 NOTE — ASSESSMENT & PLAN NOTE
Lab Results   Component Value Date    EGFR 61 10/17/2023    EGFR 63 10/16/2023    EGFR 45 (L) 10/15/2023    CREATININE 1.20 10/17/2023    CREATININE 1.17 10/16/2023    CREATININE 1.54 (H) 10/15/2023         eGFR (baseline seems 30s-40s)  Seems consistent with CKD3b likely in setting of diabetes  Monitor renal function on routine labs  Avoid nephrotoxins, NSAIDs as able  Encourage PO hydration, respecting volume status

## 2024-04-04 NOTE — ASSESSMENT & PLAN NOTE
Stable, notes sleeping well lately  Maintained on trazodone 150mg and melatonin 5mg - will trial reduction of trazodone to 100mg  Encourage sleep hygiene  Continue to monitor

## 2024-04-04 NOTE — ASSESSMENT & PLAN NOTE
Lab Results   Component Value Date    HGBA1C 6.0 (H) 08/24/2023     A1c very well controlled for age, most recently 6.2  Monitor glucose - generally controlled in 100-200 range  Avoid hypoglycemia - noted some fasting readings below 100  Continue insulin glargine - decrease from 25u to 23u daily, consider weaning further if glucose remains well controlled  Monitor A1c routinely

## 2024-04-04 NOTE — ASSESSMENT & PLAN NOTE
Wt Readings from Last 3 Encounters:   03/17/24 98.2 kg (216 lb 9.6 oz)   01/18/24 100 kg (221 lb)   12/26/23 101 kg (222 lb 3.2 oz)       Monitor daily weight - recently stable, no alarming uptrend  Baseline weight reported around 215-220 lb  Monitor volume status clinically - stable  Encourage use of knee high TEDS stockings  Continue torsemide 20mg BID, continue to monitor and adjust dose as appropriate  Follow up with Cardiology, per discussion with team he is being scheduled, presently appears to have Cardiology appointment in June 2024

## 2024-04-29 ENCOUNTER — NURSING HOME VISIT (OUTPATIENT)
Dept: GERIATRICS | Facility: OTHER | Age: 79
End: 2024-04-29
Payer: COMMERCIAL

## 2024-04-29 VITALS — TEMPERATURE: 98.1 F | DIASTOLIC BLOOD PRESSURE: 58 MMHG | SYSTOLIC BLOOD PRESSURE: 117 MMHG | HEART RATE: 68 BPM

## 2024-04-29 DIAGNOSIS — L03.115 CELLULITIS OF RIGHT LOWER EXTREMITY: Primary | ICD-10-CM

## 2024-04-29 PROBLEM — L03.90 CELLULITIS: Status: ACTIVE | Noted: 2024-04-29

## 2024-04-29 PROCEDURE — 99309 SBSQ NF CARE MODERATE MDM 30: CPT

## 2024-04-29 RX ORDER — CEPHALEXIN 500 MG/1
500 CAPSULE ORAL EVERY 12 HOURS SCHEDULED
Start: 2024-04-29 | End: 2024-05-02

## 2024-04-29 NOTE — PROGRESS NOTES
[facility]  Camille Garzawn  [address], [place], [place]  [phone]  [facility] POS 32  Acute Visit    NAME: Laureano Adair  AGE: 79 y.o. SEX: male  : 1945     DATE: 2024    CODE STATUS: No CPR     Assessment and Plan:     Problem List Items Addressed This Visit       Cellulitis - Primary     RLE  Reports leg has been itchy and he has been scratching it   Multiple open areas that patient scratched open   Redness to lower extremity   Will start patient on keflex   May apply sarna cream to prevent patient from scratching, avoid open areas of leg          Relevant Medications    cephalexin (KEFLEX) 500 mg capsule          History of Present Illness:     Patient being seen for acute complaints of leg itching. On exam patient has erythema and various wounds on right lower leg from scratching. He denies fever/chills. Denies pain.         The following portions of the patient's history were reviewed and updated as appropriate: allergies, current medications, past family history, past medical history, past social history, past surgical history and problem list.     Review of Systems:     Review of Systems   Cardiovascular:  Positive for leg swelling.   Skin:  Positive for color change and wound.   All other systems reviewed and are negative.       Problem List:     Patient Active Problem List   Diagnosis    Atrial flutter (HCC)    Chronic diastolic heart failure (HCC)    Chronic obstructive pulmonary disease (HCC)    Essential hypertension    GERD (gastroesophageal reflux disease)    Obstructive sleep apnea    Type 2 diabetes mellitus (HCC)    Chronic right-sided low back pain without sciatica    Hyponatremia    Morbid obesity (HCC)    Myocardial injury    Secondary hyperparathyroidism (HCC)    Stage 3a chronic kidney disease (HCC)    Thrombocytopenia (HCC)    Left wrist pain    Stage 3b chronic kidney disease (CKD) (HCC)    Ambulatory dysfunction    Chronic constipation    Advance  care planning    Anemia, chronic disease    Primary insomnia    Coronary artery disease involving native coronary artery of native heart without angina pectoris    Cellulitis        Objective:     /58   Pulse 68   Temp 98.1 °F (36.7 °C)     Physical Exam  Vitals and nursing note reviewed.   Constitutional:       General: He is not in acute distress.     Appearance: He is well-developed.   HENT:      Head: Normocephalic and atraumatic.   Eyes:      Conjunctiva/sclera: Conjunctivae normal.   Pulmonary:      Effort: Pulmonary effort is normal. No respiratory distress.   Musculoskeletal:         General: Swelling present.      Cervical back: Neck supple.      Right lower leg: Edema present.      Left lower leg: Edema present.   Skin:     General: Skin is warm and dry.      Capillary Refill: Capillary refill takes less than 2 seconds.      Findings: Abrasion, erythema and wound present.   Neurological:      General: No focal deficit present.      Mental Status: He is alert. Mental status is at baseline.   Psychiatric:         Mood and Affect: Mood normal.         Behavior: Behavior normal.         Pertinent Laboratory/Diagnostic Studies:    Laboratory Results: I have personally reviewed the pertinent laboratory results/reports     Radiology/Other Diagnostic Testing Results: I have personally reviewed pertinent reports.      VICKI Dangelo  Geriatric Medicine

## 2024-04-29 NOTE — ASSESSMENT & PLAN NOTE
RLE  Reports leg has been itchy and he has been scratching it   Multiple open areas that patient scratched open   Redness to lower extremity   Will start patient on keflex   May apply sarna cream to prevent patient from scratching, avoid open areas of leg

## 2024-05-03 ENCOUNTER — NURSING HOME VISIT (OUTPATIENT)
Dept: GERIATRICS | Facility: OTHER | Age: 79
End: 2024-05-03
Payer: COMMERCIAL

## 2024-05-03 VITALS
SYSTOLIC BLOOD PRESSURE: 116 MMHG | OXYGEN SATURATION: 99 % | TEMPERATURE: 97.4 F | DIASTOLIC BLOOD PRESSURE: 54 MMHG | WEIGHT: 216 LBS | HEART RATE: 74 BPM

## 2024-05-03 DIAGNOSIS — N18.32 STAGE 3B CHRONIC KIDNEY DISEASE (CKD) (HCC): ICD-10-CM

## 2024-05-03 DIAGNOSIS — R26.2 AMBULATORY DYSFUNCTION: ICD-10-CM

## 2024-05-03 DIAGNOSIS — E11.22 TYPE 2 DIABETES MELLITUS WITH STAGE 3B CHRONIC KIDNEY DISEASE, WITH LONG-TERM CURRENT USE OF INSULIN (HCC): ICD-10-CM

## 2024-05-03 DIAGNOSIS — I10 ESSENTIAL HYPERTENSION: ICD-10-CM

## 2024-05-03 DIAGNOSIS — J41.0 SIMPLE CHRONIC BRONCHITIS (HCC): ICD-10-CM

## 2024-05-03 DIAGNOSIS — K59.09 CHRONIC CONSTIPATION: ICD-10-CM

## 2024-05-03 DIAGNOSIS — Z79.4 TYPE 2 DIABETES MELLITUS WITH STAGE 3B CHRONIC KIDNEY DISEASE, WITH LONG-TERM CURRENT USE OF INSULIN (HCC): ICD-10-CM

## 2024-05-03 DIAGNOSIS — L03.115 CELLULITIS OF RIGHT LOWER EXTREMITY: Primary | ICD-10-CM

## 2024-05-03 DIAGNOSIS — N18.32 TYPE 2 DIABETES MELLITUS WITH STAGE 3B CHRONIC KIDNEY DISEASE, WITH LONG-TERM CURRENT USE OF INSULIN (HCC): ICD-10-CM

## 2024-05-03 DIAGNOSIS — I50.32 CHRONIC DIASTOLIC HEART FAILURE (HCC): ICD-10-CM

## 2024-05-03 PROCEDURE — 99309 SBSQ NF CARE MODERATE MDM 30: CPT

## 2024-05-03 NOTE — ASSESSMENT & PLAN NOTE
A1c 2/2024 6.2  BS log reviewed, trending 100s  Can decrease lantus from 23 u daily to 20 u daily  Monitor fasting accucheks   Repeat A1c in 3 months

## 2024-05-03 NOTE — ASSESSMENT & PLAN NOTE
Wt Readings from Last 3 Encounters:   05/03/24 98 kg (216 lb)   03/17/24 98.2 kg (216 lb 9.6 oz)   01/18/24 100 kg (221 lb)     Euvolemic, weight stable   Continue torsemide 20 mg BID  Continue beta blocker  Monitor weights   Routine BMP monitoring   F/u with cardiology next month

## 2024-05-03 NOTE — ASSESSMENT & PLAN NOTE
RLE  Multiple open areas that patient scratched open   Redness to lower extremity has improved s/p abx   continue sarna cream to prevent patient from scratching, avoid open areas of leg

## 2024-05-03 NOTE — ASSESSMENT & PLAN NOTE
Reports last BM yesterday   Continue bowel egimen at facility as ordered; change miralax from PRN to scheduled daily; continue senna-S qHS; consider bisacodyl suppository PRN if no BM in 2-3 days   Encourage po hydration

## 2024-05-03 NOTE — ASSESSMENT & PLAN NOTE
BP acceptable   Continue carvedilol 1.56 mg BID, torsemide 20 mg daily and terazosin 1 mg QHS  Monitor BP

## 2024-05-03 NOTE — ASSESSMENT & PLAN NOTE
Lab Results   Component Value Date    EGFR 61 10/17/2023    EGFR 63 10/16/2023    EGFR 45 (L) 10/15/2023    CREATININE 1.20 10/17/2023    CREATININE 1.17 10/16/2023    CREATININE 1.54 (H) 10/15/2023     Cr baseline appears to be 1.5-1.8  Cr 2/2024 1.65  Avoid nephrotoxins, NSAIDs  Routine BMP monitoring, repeat in 3 months

## 2024-06-05 ENCOUNTER — NURSING HOME VISIT (OUTPATIENT)
Dept: GERIATRICS | Facility: OTHER | Age: 79
End: 2024-06-05
Payer: COMMERCIAL

## 2024-06-05 DIAGNOSIS — I50.32 CHRONIC DIASTOLIC HEART FAILURE (HCC): Primary | ICD-10-CM

## 2024-06-05 DIAGNOSIS — I25.10 CORONARY ARTERY DISEASE INVOLVING NATIVE CORONARY ARTERY OF NATIVE HEART WITHOUT ANGINA PECTORIS: ICD-10-CM

## 2024-06-05 DIAGNOSIS — Z79.4 TYPE 2 DIABETES MELLITUS WITH STAGE 3B CHRONIC KIDNEY DISEASE, WITH LONG-TERM CURRENT USE OF INSULIN (HCC): ICD-10-CM

## 2024-06-05 DIAGNOSIS — F51.01 PRIMARY INSOMNIA: ICD-10-CM

## 2024-06-05 DIAGNOSIS — H54.61 VISION LOSS OF RIGHT EYE: ICD-10-CM

## 2024-06-05 DIAGNOSIS — I10 ESSENTIAL HYPERTENSION: ICD-10-CM

## 2024-06-05 DIAGNOSIS — E11.22 TYPE 2 DIABETES MELLITUS WITH STAGE 3B CHRONIC KIDNEY DISEASE, WITH LONG-TERM CURRENT USE OF INSULIN (HCC): ICD-10-CM

## 2024-06-05 DIAGNOSIS — K21.9 GASTROESOPHAGEAL REFLUX DISEASE, UNSPECIFIED WHETHER ESOPHAGITIS PRESENT: ICD-10-CM

## 2024-06-05 DIAGNOSIS — J41.0 SIMPLE CHRONIC BRONCHITIS (HCC): ICD-10-CM

## 2024-06-05 DIAGNOSIS — R26.2 AMBULATORY DYSFUNCTION: ICD-10-CM

## 2024-06-05 DIAGNOSIS — D63.8 ANEMIA, CHRONIC DISEASE: ICD-10-CM

## 2024-06-05 DIAGNOSIS — N18.32 TYPE 2 DIABETES MELLITUS WITH STAGE 3B CHRONIC KIDNEY DISEASE, WITH LONG-TERM CURRENT USE OF INSULIN (HCC): ICD-10-CM

## 2024-06-05 DIAGNOSIS — N18.32 STAGE 3B CHRONIC KIDNEY DISEASE (CKD) (HCC): ICD-10-CM

## 2024-06-05 PROCEDURE — 99309 SBSQ NF CARE MODERATE MDM 30: CPT | Performed by: STUDENT IN AN ORGANIZED HEALTH CARE EDUCATION/TRAINING PROGRAM

## 2024-06-05 NOTE — PROGRESS NOTES
[facility]  Facility: [facility]    PROGRESS NOTE  Nursing Home Place of Service: nursing home place of service: POS 32 Unskilled- No Part A Coverage    NAME: Laureano Adair  : 1945 AGE: 79 y.o. SEX: male MRN: [id number]  DATE OF ENCOUNTER: 2024    Assessment and Plan     Problem List Items Addressed This Visit       Chronic diastolic heart failure (HCC) - Primary     Wt Readings from Last 3 Encounters:   24 98 kg (216 lb)   24 98.2 kg (216 lb 9.6 oz)   24 100 kg (221 lb)       Monitor daily weight - fairly stable, noted to have some mild increase recently by around 6 lb since last month, still near his baseline weight  Baseline weight reported around 215-220 lb  Monitor weights closely at this time. He does have good appetite likely contributing to weight.  Monitor volume status clinically - seems stable, peripheral edema stable or improved, no orthopnea, no alarm symptoms of CHF exacerbation  Encourage use of knee high TEDS stockings  Continue torsemide 20mg BID, continue to monitor and adjust dose as appropriate  Follow up with Cardiology, presently appears to have Cardiology appointment in 2024             Chronic obstructive pulmonary disease (HCC)     Seems to be quite mild, patient denies any recent breathing concerns  Has not been requiring any breathing treatments/inhalers  Continue to monitor  10/2013 outpatient PFTs reportedly showed mild obstruction  Consider outpatient PFTs with pulmonary as appropriate         Essential hypertension     Monitor BP - generally controlled/stable 100s-130s systolic  Pulse generally 60s-70s, sometimes borderline bradycardic  No acute cardiac complaints  Avoid hypotension  Continue regimen including carvedilol 1.56mg BID, terazosin 1mg, torsemide 20mg BID  Consider adjusting/weaning regimen as appropriate  Follow up with Cardiology         GERD (gastroesophageal reflux disease)     Stable per patient  -monitor off  medications  -recommend OOB with meals, sit upright for at least 30 minutes afterwards, avoid trigger foods  -continue to monitor  -follow up with GI as needed         Type 2 diabetes mellitus ([facility])       Lab Results   Component Value Date    HGBA1C 6.0 (H) 08/24/2023     A1c very well controlled for age, recently 6.2  Monitor glucose - fasting sugars generally low-mid 100s  Will ask team to retime fasting sugars from 5am to a bit later in the morning, around 7am if able, to allow him better sleep  Avoid hypoglycemia  Continue insulin glargine - 20u daily, consider weaning further if glucose remains well controlled  Monitor A1c routinely         Vision loss of right eye     Patient notes chronic stable central vision loss with intact peripheral vision in R eye  No acute change or associated pain  Interested in seeing Ophthalmology for what is potentially macular degeneration  Will discuss with team to set up Ophtho consult for patient  Continue to monitor for acute change         Stage 3b chronic kidney disease (CKD) ([facility])     Lab Results   Component Value Date    EGFR 61 10/17/2023    EGFR 63 10/16/2023    EGFR 45 (L) 10/15/2023    CREATININE 1.20 10/17/2023    CREATININE 1.17 10/16/2023    CREATININE 1.54 (H) 10/15/2023     eGFR (baseline seems 30s-40s)  Seems consistent with CKD3b likely in setting of diabetes  Monitor renal function on routine labs - stable  Avoid nephrotoxins, NSAIDs as able  Encourage PO hydration, respecting volume status         Ambulatory dysfunction     Mainly uses wheelchair, at times rollator at baseline  Denies recent falls  -PT/OT as appropriate  -fall precautions  -encourage appropriate DME use         Anemia, chronic disease     Likely multifactorial in setting of CKD and suspect iron deficiency  Continues on iron supplementation  -monitor on routine labs - stable  -monitor for acute bleed - no present signs  -consider further workup, Heme consult if persistent/worsening  -transfuse  PRN Hb <7         Primary insomnia     Stable, notes sleeping well lately  Maintained on trazodone 150mg and melatonin 5mg - will trial reduction of trazodone to 100mg (was meant to be reduced last visit, order did not appear to carry through)  Encourage sleep hygiene  Continue to monitor         Coronary artery disease involving native coronary artery of native heart without angina pectoris     Per records, history of CAD resulting in an anterior wall myocardial infarction following which he underwent coronary artery bypass surgery in 1997  No acute cardiac complaints  Used to be on Xarelto, appears stopped due to hx of bleed per records  Continue aspirin, statin, carvedilol  Follow up with Cardiology                Chief Complaint     Follow up 60 day    History of Present Illness     Laureano Adair is a 79 y.o. male who was seen today for follow up. PMH including CHF, atherosclerotic heart disease, hypertension, BPH, DM type 2, CKD, vitamin D deficiency, and COPD     Patient observed in ortiz able to self-propel wheelchair  Patient seen and examined in room  Others present: none  Patient seated in chair  Appears comfortable, awake, alert, oriented to situation, able to converse appropriately  Patient polite, in good spirits, Aox3, appears to be a reasonable historian. He notes he feels good overall recently. Periphral edema he feels has been improved overall recently. Notes chronic stable right eye central vision loss without acute change or pain, interested in seeing ophtho. Also requests to stop early morning glucose checks as it disturbs his sleep. Breathing fine, no orthopnea. Appetite good, denies swallowing issues, no abd pain/N/V, constipation better lately, last BM today. No acute pain anywhere, notes chronic bilateral knee pain (hx of bilat knee replacement) which is stable and he does not feel he needs anything more for it. No acute cardiopulmonary, abdominal, or urinary symptoms; see ROS for more  details. Generally gets around using wheelchair or rollator and denies recent falls.     No further questions or acute concerns identified.  No acute concerns per NP and nursing.     Lab Review:   12/18/23: CBC with Hb 9.5; CMP with GFR 51; uric acid 9.4; TSH WNL; A1c from Aug 2023 was 6.0  1/3/24: BMP with Cr 1.63 (baseline seems 1.5-1.8), eGFR 42 (baseline seems 30s-40s)  2/7/24: CBC with Hb 9.4 (stable, normocytic); BMP with Cr 1.78, eGFR 38; A1c 6.2  2/21/24: BMP with Cr 1.65, eGFR 41           Echo Oct 2023: difficult study, EF not reported, systolic function overall preserved, indeterminate diastolic dysfunction    The following portions of the patient's history were reviewed and updated as appropriate: allergies, current medications, past family history, past medical history, past social history, past surgical history and problem list.    Review of Systems     Review of Systems   Constitutional:  Negative for appetite change, chills, diaphoresis and fever.   HENT:  Positive for hearing loss (mild, chronic, stable). Negative for drooling, ear pain, rhinorrhea, sore throat and trouble swallowing.    Eyes:  Negative for pain, discharge, redness, itching and visual disturbance (chronic R eye central vision loss stable).   Respiratory:  Negative for cough, chest tightness, shortness of breath and wheezing.    Cardiovascular:  Positive for leg swelling (chronic, stable/improved from prior). Negative for chest pain and palpitations.   Gastrointestinal:  Negative for abdominal pain, blood in stool, constipation, diarrhea, nausea and vomiting.   Genitourinary:  Negative for difficulty urinating, dysuria, flank pain and hematuria.   Musculoskeletal:  Positive for gait problem. Negative for arthralgias.   Skin:  Negative for color change.   Neurological:  Negative for dizziness, tremors, seizures, facial asymmetry and headaches.   Psychiatric/Behavioral:  Negative for agitation, behavioral problems and confusion. The  patient is not nervous/anxious and is not hyperactive.        Active Problem List     Patient Active Problem List   Diagnosis    Atrial flutter (HCC)    Chronic diastolic heart failure (HCC)    Chronic obstructive pulmonary disease (HCC)    Essential hypertension    GERD (gastroesophageal reflux disease)    Obstructive sleep apnea    Type 2 diabetes mellitus (HCC)    Chronic right-sided low back pain without sciatica    Hyponatremia    Morbid obesity (HCC)    Myocardial injury    Secondary hyperparathyroidism (HCC)    Stage 3a chronic kidney disease (HCC)    Thrombocytopenia (HCC)    Vision loss of right eye    Left wrist pain    Stage 3b chronic kidney disease (CKD) (HCC)    Ambulatory dysfunction    Chronic constipation    Advance care planning    Anemia, chronic disease    Primary insomnia    Coronary artery disease involving native coronary artery of native heart without angina pectoris    Cellulitis       Objective     Vital Signs:     BP: 118/59 mmHg  6/5/2024 10:17   Temp:97.4 °F  5/1/2024 06:21 Pulse:69 bpm  6/5/2024 10:17 Weight:222.3 Lbs  6/5/2024 16:48   Resp:18 Breaths/min  5/1/2024 06:21 BS:152 mg/dL  6/5/2024 06:05 O2:99 %  5/1/2024 06:21 Pain:0  6/5/2024 18:42       Physical Exam  Vitals reviewed.   Constitutional:       General: He is not in acute distress.     Appearance: He is obese. He is not toxic-appearing or diaphoretic.   HENT:      Head: Normocephalic and atraumatic.      Right Ear: External ear normal.      Left Ear: External ear normal.      Nose: Nose normal. No rhinorrhea.      Mouth/Throat:      Mouth: Mucous membranes are dry.      Pharynx: Oropharynx is clear. No oropharyngeal exudate or posterior oropharyngeal erythema.   Eyes:      General: No scleral icterus.        Right eye: No discharge.         Left eye: No discharge.      Extraocular Movements: Extraocular movements intact.      Conjunctiva/sclera: Conjunctivae normal.      Pupils: Pupils are equal, round, and reactive to  light.   Cardiovascular:      Rate and Rhythm: Normal rate and regular rhythm.   Pulmonary:      Effort: Pulmonary effort is normal. No respiratory distress.      Breath sounds: No wheezing or rales.   Abdominal:      General: Bowel sounds are normal.      Palpations: Abdomen is soft.      Tenderness: There is no abdominal tenderness. There is no guarding.   Musculoskeletal:         General: Swelling present. No tenderness.      Cervical back: No rigidity.      Comments: Trace-1+ bilateral lower extremity edema (appears stable/improved from prior), no noted open wound/active drainage   Skin:     General: Skin is warm and dry.      Coloration: Skin is not jaundiced.   Neurological:      General: No focal deficit present.      Mental Status: He is alert and oriented to person, place, and time. Mental status is at baseline.   Psychiatric:         Mood and Affect: Mood normal.         Behavior: Behavior normal.         Thought Content: Thought content normal.         Judgment: Judgment normal.         Pertinent Laboratory/Diagnostic Studies:  Laboratory and Imaging studies reviewed. Full report in the paper chart.     Current Medications   Medications reviewed and updated in facility chart.      -Total time spent on this encounter today including documentation and workup review, face to face time, history and exam, and documentation/orders was approximately 40 minutes.  -This note will be copied to [facility] EMR where vitals and medication orders are placed.    Raúl Clemente D.O.  Geriatric Medicine  6/5/2024 7:45 PM

## 2024-06-05 NOTE — ASSESSMENT & PLAN NOTE
Lab Results   Component Value Date    HGBA1C 6.0 (H) 08/24/2023     A1c very well controlled for age, recently 6.2  Monitor glucose - fasting sugars generally low-mid 100s  Will ask team to retime fasting sugars from 5am to a bit later in the morning, around 7am if able, to allow him better sleep  Avoid hypoglycemia  Continue insulin glargine - 20u daily, consider weaning further if glucose remains well controlled  Monitor A1c routinely

## 2024-06-05 NOTE — ASSESSMENT & PLAN NOTE
Wt Readings from Last 3 Encounters:   05/03/24 98 kg (216 lb)   03/17/24 98.2 kg (216 lb 9.6 oz)   01/18/24 100 kg (221 lb)       Monitor daily weight - fairly stable, noted to have some mild increase recently by around 6 lb since last month, still near his baseline weight  Baseline weight reported around 215-220 lb  Monitor weights closely at this time. He does have good appetite likely contributing to weight.  Monitor volume status clinically - seems stable, peripheral edema stable or improved, no orthopnea, no alarm symptoms of CHF exacerbation  Encourage use of knee high TEDS stockings  Continue torsemide 20mg BID, continue to monitor and adjust dose as appropriate  Follow up with Cardiology, presently appears to have Cardiology appointment in June 2024

## 2024-06-05 NOTE — ASSESSMENT & PLAN NOTE
Patient notes chronic stable central vision loss with intact peripheral vision in R eye  No acute change or associated pain  Interested in seeing Ophthalmology for what is potentially macular degeneration  Will discuss with team to set up Ophtho consult for patient  Continue to monitor for acute change

## 2024-06-05 NOTE — ASSESSMENT & PLAN NOTE
Monitor BP - generally controlled/stable 100s-130s systolic  Pulse generally 60s-70s, sometimes borderline bradycardic  No acute cardiac complaints  Avoid hypotension  Continue regimen including carvedilol 1.56mg BID, terazosin 1mg, torsemide 20mg BID  Consider adjusting/weaning regimen as appropriate  Follow up with Cardiology

## 2024-06-05 NOTE — ASSESSMENT & PLAN NOTE
Stable, notes sleeping well lately  Maintained on trazodone 150mg and melatonin 5mg - will trial reduction of trazodone to 100mg (was meant to be reduced last visit, order did not appear to carry through)  Encourage sleep hygiene  Continue to monitor

## 2024-06-05 NOTE — ASSESSMENT & PLAN NOTE
Mainly uses wheelchair, at times rollator at baseline  Denies recent falls  -PT/OT as appropriate  -fall precautions  -encourage appropriate DME use

## 2024-06-05 NOTE — ASSESSMENT & PLAN NOTE
Lab Results   Component Value Date    EGFR 61 10/17/2023    EGFR 63 10/16/2023    EGFR 45 (L) 10/15/2023    CREATININE 1.20 10/17/2023    CREATININE 1.17 10/16/2023    CREATININE 1.54 (H) 10/15/2023     eGFR (baseline seems 30s-40s)  Seems consistent with CKD3b likely in setting of diabetes  Monitor renal function on routine labs - stable  Avoid nephrotoxins, NSAIDs as able  Encourage PO hydration, respecting volume status

## 2024-06-05 NOTE — ASSESSMENT & PLAN NOTE
Stable per patient  -monitor off medications  -recommend OOB with meals, sit upright for at least 30 minutes afterwards, avoid trigger foods  -continue to monitor  -follow up with GI as needed

## 2024-07-03 ENCOUNTER — NURSING HOME VISIT (OUTPATIENT)
Dept: GERIATRICS | Facility: OTHER | Age: 79
End: 2024-07-03
Payer: COMMERCIAL

## 2024-07-03 DIAGNOSIS — I50.32 CHRONIC DIASTOLIC HEART FAILURE (HCC): ICD-10-CM

## 2024-07-03 DIAGNOSIS — N18.32 TYPE 2 DIABETES MELLITUS WITH STAGE 3B CHRONIC KIDNEY DISEASE, WITH LONG-TERM CURRENT USE OF INSULIN (HCC): Primary | ICD-10-CM

## 2024-07-03 DIAGNOSIS — I10 ESSENTIAL HYPERTENSION: ICD-10-CM

## 2024-07-03 DIAGNOSIS — K21.9 GASTROESOPHAGEAL REFLUX DISEASE, UNSPECIFIED WHETHER ESOPHAGITIS PRESENT: ICD-10-CM

## 2024-07-03 DIAGNOSIS — E11.22 TYPE 2 DIABETES MELLITUS WITH STAGE 3B CHRONIC KIDNEY DISEASE, WITH LONG-TERM CURRENT USE OF INSULIN (HCC): Primary | ICD-10-CM

## 2024-07-03 DIAGNOSIS — Z79.4 TYPE 2 DIABETES MELLITUS WITH STAGE 3B CHRONIC KIDNEY DISEASE, WITH LONG-TERM CURRENT USE OF INSULIN (HCC): Primary | ICD-10-CM

## 2024-07-03 DIAGNOSIS — R26.2 AMBULATORY DYSFUNCTION: ICD-10-CM

## 2024-07-03 DIAGNOSIS — N18.32 STAGE 3B CHRONIC KIDNEY DISEASE (CKD) (HCC): ICD-10-CM

## 2024-07-03 DIAGNOSIS — E66.01 MORBID OBESITY (HCC): ICD-10-CM

## 2024-07-03 PROCEDURE — 99309 SBSQ NF CARE MODERATE MDM 30: CPT

## 2024-07-15 VITALS
OXYGEN SATURATION: 97 % | HEART RATE: 70 BPM | DIASTOLIC BLOOD PRESSURE: 54 MMHG | RESPIRATION RATE: 18 BRPM | TEMPERATURE: 97 F | SYSTOLIC BLOOD PRESSURE: 151 MMHG | WEIGHT: 229.4 LBS

## 2024-07-15 NOTE — ASSESSMENT & PLAN NOTE
Recent weight increase  Healthy appetite at LTC facility  Encourage healthy dietary choices  Continue to monitor weights daily

## 2024-07-15 NOTE — ASSESSMENT & PLAN NOTE
Stable off medications  Continue to monitor  Recommend OOB for all meals, remain upright for at least 30 minutes following meals, avoid trigger foods

## 2024-07-15 NOTE — ASSESSMENT & PLAN NOTE
Continue PT/OT  Recently has been cleared by therapy to move around the facility more independently, has been seen walking with walker  Able to independently complete laundry   Completed and allowed to car transfer with wife  Encourage use of assistive device  Continue fall precautions  Encourage patient to participate with activities  Provide education for patient, family, and caregivers

## 2024-07-15 NOTE — ASSESSMENT & PLAN NOTE
Lab Results   Component Value Date    EGFR 61 10/17/2023    EGFR 63 10/16/2023    EGFR 45 (L) 10/15/2023    CREATININE 1.20 10/17/2023    CREATININE 1.17 10/16/2023    CREATININE 1.54 (H) 10/15/2023     Baseline appears to be 1.5-1.8  BMP on 06/18/24  Creatinine-1.66  BUN-32  eGFR-41  Monitor I/O's  Encourage PO hydration  Monitor for hypertension  Avoid hypotension  Monitor diabetes  Avoid nephrotoxins, NSAIDs

## 2024-07-15 NOTE — ASSESSMENT & PLAN NOTE
Lab Results   Component Value Date    HGBA1C 6.0 (H) 08/24/2023     HgA1c on 02/07-6.2  Well controlled based on co-morbidities   Continue to monitor HgA1c Q6 months  Continue insulin glargine 20 units SQ daily  Avoid hypoglycemia   [address] [place] [address] 
[phone] Medical Progress Note NAME: Luli Ross :  1956 MRM:  [id number] Date/Time: 2018  7:15 AM 
 
Subjective:  
 
Events leading to admission noted. Patient well known to me with complex and strange history. Has been declining for > 1 year after a stroke and right thigh abscess left her with bilateral LE weakness (R>L) and she has been essentially bedridden since then with little inclination to help herself or participate in therapies. Most recently has stopped taking her meds and refused to come to the office or ED (until this presentation). Issues currently also exacerbated by gastroparesis. Diabetic management has been horrible. Hygiene is terrible due to obesity and immobility. Verbalization is minimal.  Her mentation is difficult to characterize but she clearly appears depressed. Presents with decline in renal function most likely due to decreased po intake as she probably has not been taking her diuretic. Incontinent stool and urine. H/O c diff colitis. She really is the definition of FTT. Have opened discussions re palliative care and hospice but  reluctant and she doesn't verbalize her thoughts. For now will pursue palliative care further but in meantime try to correct some of metabolic abnormalities and start RX depression. Even if she improves I am not certain it will persist if she is eventually discharged to home. Very frustating and difficult management problem. This am lethargic and nonverbal again. Underwent I&D labial abscess yesterday. Cultures pending.  says she ate solid food well last nigh but is still having difficulties with pills. Will try to simplify meds and change Reglan to syrup. .  CT head w/o new findings. .. Right encephalomalacia.   CT abd pelvis negative except thickening abdominal wall tissues. Didn't have odynophagia yesterday  and ? EGD probably warranted at some point. Still needs ST and wound care to see as well asPT?OT. I don't see her being discharged to home from this hospitalization and still not sure she can improve that much re FTTCailin Bowling Past Medical History reviewed and unchanged from Admission History and Physical and/or prior progress note/electronic medical record Medications reviewed. ROS:   
 
General: postive for weakness  Drainage left side vulva Eyes: positive for left eye drainage and irritation Ear Nose and Throat: positive for ? odynophagia Respiratory:  positive for PIPER Cardiology:  negative for chest pain, palpitations, + for orthopnea, PND, edema, 
Gastrointestinal: positive for N/V, difficulties swallowing and gastroparesis Genitourinary: positive for incontinence Muskuloskeletal : negative for arthralgia, myalgia Neurological: positive for paraparesis, dysphasia and diminished mentation Objective:  
 
Last 24hrs VS reviewed since prior progress note. Most recent are: 
 
Visit Vitals BP 96/71 (BP 1 Location: Left arm, BP Patient Position: At rest) Pulse 63 Temp 98.3 °F (36.8 °C) Resp 18 Ht 5' (1.524 m) Wt 208 lb 8.9 oz (94.6 kg) SpO2 97% Breastfeeding? No  
BMI 40.73 kg/m² SpO2 Readings from Last 6 Encounters:  
12/04/18 97% 11/29/18 95% 11/27/18 93% 11/20/18 96% 11/16/18 97% 11/15/18 91% Intake/Output Summary (Last 24 hours) at 12/4/2018 [facility] Last data filed at 12/4/2018 3854 Gross per 24 hour Intake 120 ml Output 200 ml Net -80 ml Physical Exam: 
 
General appearance: alert, uncooperative, no distress, slowed mentation, appears older than stated age Eyes: conjunctivitis left eye Neck: supple, symmetrical, trachea midline, no adenopathy and thyroid: not enlarged, symmetric, no tenderness/mass/nodules Lungs: clear to auscultation bilaterally Heart: regular rate and rhythm, S1, S2 normal, grade 2/6 systolic murmur, no click, rub or gallop Abdomen: morbidly obese. No appreciable mass or tenderness;  Drainage from left vulvar area and thickened abd wall Skin:  Significant sacral, buttock and perineal breakdown Extremities: 3+ brawny edema with scaling lichenified skin Neurologic: paraparesis, dysphasia, essentially nonverbal 
 
Data Review: 
 
Lab: 
 
BMP:  
Lab Results Component Value Date/Time  12/04/2018 04:03 AM  
 K 4.0 12/04/2018 04:03 AM  
  (H) 12/04/2018 04:03 AM  
 CO2 17 (L) 12/04/2018 04:03 AM  
 AGAP 9 12/04/2018 04:03 AM  
  (H) 12/04/2018 04:03 AM  
 BUN 41 (H) 12/04/2018 04:03 AM  
 CREA 1.91 (H) 12/04/2018 04:03 AM  
 GFRAA 32 (L) 12/04/2018 04:03 AM  
 GFRNA 26 (L) 12/04/2018 04:03 AM  
 
CBC:  
Lab Results Component Value Date/Time WBC 9.6 12/04/2018 04:03 AM  
 HGB 10.6 (L) 12/04/2018 04:03 AM  
 HCT 34.0 (L) 12/04/2018 04:03 AM  
  12/04/2018 04:03 AM  
 
Recent Glucose Results:  
Lab Results Component Value Date/Time  (H) 12/04/2018 04:03 AM  
 
All labs reviewed in past 24 hours Other pertinent lab: A1C > 10. TSH normal 
 
Imaging: 
 
Reviewed. CXR Assessment / Plan:  
 
Principal Problem: 
  FTT (failure to thrive) in adult (12/2/2018) Active Problems: 
  Right leg weakness (4/18/2013) Overview: R/O CVA, HNP, mononeuritis Diabetic gastroparesis ([facility] () Stroke [facility]) (5/30/2014) Overview: Recurrent, bilateral 
 
  Acute renal failure (ARF) ([address] (12/1/2018) Diabetic polyneuropathy ([facility] (4/20/2013) ASCVD (arteriosclerotic cardiovascular disease) (5/31/2014) Hypertension (7/24/2017) (HFpEF) heart failure with preserved ejection fraction ([facility] (10/31/2017) DM (diabetes mellitus) ([facility] (12/1/2018) Vulvar abscess (12/3/2018) Morbid obesity ([facility] (1/26/2011) CKD (chronic kidney disease) stage 3, GFR 30-59 ml/min ([facility] () Hyperlipemia () Aortic stenosis, mild (7/24/2017) GERD (gastroesophageal reflux disease) (10/11/2017) Severe episode of recurrent major depressive disorder, without psychotic features ([facility] (12/2/2018) 1. Hydrate gently 2. GI consult ? EGD eventually or MBS 3. Culture drainage pending 4. CT head as above 5. ECHO pending 6. Antidepressant 7. Wound care 8. PT/OT/ST 9. Palliative care 10. Follow labs 11. [place] 12. Likely will need PICC line if does not opt for Hospice 13. Adjust meds as above Care Plan discussed with: Patient, Family, Nursing Staff and Consultant/Specialist 
 
Discussed:  Care Plan Prophylaxis:  Hep SQ and H2B/PPI Disposition:  ??? 
___________________________________________________ Attending Physician: Tarah Jacob MD

## 2024-07-15 NOTE — ASSESSMENT & PLAN NOTE
Blood pressures reviewed from facility records  Stable  Continue carvedilol 1.56 mg po BID  Continue terazosin 1 mg po daily at bedtime  Continue torsemide 40 mg po daily  Recently changed to daily at resident's request due to increased urinary frequency   Encourage medication adherence   Continue to monitor blood pressure weekly   Encourage heart healthy diet  Consult and follow up with cardiologist

## 2024-07-15 NOTE — PROGRESS NOTES
[address], [address], [place] [address]  [phone]    NAME: Laureano Adair  AGE: 79 y.o. SEX: male    Progress Note    Location: [facility]  POS: 32 ([facility])  Code Status: DNR    Chief complaint / Reason for visit:  Follow-up visit    Assessment/Plan:    Essential hypertension  Blood pressures reviewed from facility records  Stable  Continue carvedilol 1.56 mg po BID  Continue terazosin 1 mg po daily at bedtime  Continue torsemide 40 mg po daily  Recently changed to daily at resident's request due to increased urinary frequency   Encourage medication adherence   Continue to monitor blood pressure weekly   Encourage heart healthy diet  Consult and follow up with cardiologist     Chronic diastolic heart failure (HCC)  Weight increased  More due to appetite  Does not exceed fluid restrictions   No signs of fluid overload  Lungs clear, no SOB  Chronic lower extremity edema  Encourage use of knee high TEDS stockings  Non-compliant  VSS  Continue torsemide 40 mg po daily  Changed to daily from BID at resident's request  Continue salt restricted diet  Follow up with cardiologist     GERD (gastroesophageal reflux disease)  Stable off medications  Continue to monitor  Recommend OOB for all meals, remain upright for at least 30 minutes following meals, avoid trigger foods    Type 2 diabetes mellitus (HCC)    Lab Results   Component Value Date    HGBA1C 6.0 (H) 08/24/2023     HgA1c on 02/07-6.2  Well controlled based on co-morbidities   Continue to monitor HgA1c Q6 months  Continue insulin glargine 20 units SQ daily  Avoid hypoglycemia    Stage 3b chronic kidney disease (CKD) (Formerly [facility])  Lab Results   Component Value Date    EGFR 61 10/17/2023    EGFR 63 10/16/2023    EGFR 45 (L) 10/15/2023    CREATININE 1.20 10/17/2023    CREATININE 1.17 10/16/2023    CREATININE 1.54 (H) 10/15/2023     Baseline appears to be 1.5-1.8  BMP on 06/18/24  Creatinine-1.66  BUN-32  eGFR-41  Monitor  I/O's  Encourage PO hydration  Monitor for hypertension  Avoid hypotension  Monitor diabetes  Avoid nephrotoxins, NSAIDs    Ambulatory dysfunction  Continue PT/OT  Recently has been cleared by therapy to move around the facility more independently, has been seen walking with walker  Able to independently complete laundry   Completed and allowed to car transfer with wife  Encourage use of assistive device  Continue fall precautions  Encourage patient to participate with activities  Provide education for patient, family, and caregivers      Morbid obesity (HCC)  Recent weight increase  Healthy appetite at LT facility  Encourage healthy dietary choices  Continue to monitor weights daily      This is an 79 y.o. male seen today at [facility].  Medical history includes, not limited to, CHF, atherosclerotic heart disease, hypertension, BPH, DM type 2, CKD, vitamin D deficiency, and COPD.    Resident is seen today for a routine follow up visit.  He is out of bed in his wheelchair.  Alert and oriented x 3, able to answer questions appropriately.  Currently denies pain.  Was recently evaluated by physical therapy to complete car transfers with his wife which he states went well.  Evaluated bilateral lower extremity edema, he has areas of irritation that nursing states he scratches.  Encouraged him to use the lotion and try to avoiding scratching his skin open.        Reviewed resident with nursing staff.  Reviewed recent labs, vitals and orders.           Nursing and prior provider notes reviewed on this visit. Discussed visit with PCP and nursing staff/ supervisor.      Review of Systems   Constitutional:  Positive for activity change. Negative for appetite change, fatigue and fever.   HENT:  Positive for hearing loss. Negative for congestion and rhinorrhea.    Eyes:  Negative for pain and visual disturbance.   Respiratory:  Negative for cough and shortness of breath.    Cardiovascular:  Positive for leg swelling.  Negative for chest pain.   Gastrointestinal:  Negative for abdominal pain and constipation.   Genitourinary:  Negative for difficulty urinating and dysuria.   Musculoskeletal:  Positive for gait problem. Negative for arthralgias.   Skin:  Negative for color change and rash.   Neurological:  Negative for dizziness, syncope and weakness.   Psychiatric/Behavioral:  Negative for behavioral problems and confusion.    All other systems reviewed and are negative.         ALLERGY: Reviewed, unchanged  Allergies   Allergen Reactions    Abciximab Other (See Comments)     1/97 rec'd no rxn; Pt unsure of any reaction      Medical Tape        HISTORY:  Medical Hx: Reviewed, unchanged  Family Hx: Reviewed, unchanged  Soc Hx: Reviewed,  unchanged      Physical Exam  Vitals reviewed.   Constitutional:       General: He is not in acute distress.     Appearance: Normal appearance. He is obese. He is not ill-appearing.   HENT:      Head: Normocephalic.      Right Ear: Tympanic membrane normal.      Left Ear: Tympanic membrane normal.      Nose: Nose normal. No congestion.      Mouth/Throat:      Mouth: Mucous membranes are moist.      Pharynx: Oropharynx is clear.   Eyes:      General:         Right eye: No discharge.         Left eye: No discharge.      Extraocular Movements: Extraocular movements intact.      Conjunctiva/sclera: Conjunctivae normal.      Pupils: Pupils are equal, round, and reactive to light.   Cardiovascular:      Rate and Rhythm: Normal rate and regular rhythm.      Pulses: Normal pulses.      Heart sounds: Normal heart sounds.   Pulmonary:      Effort: Pulmonary effort is normal. No respiratory distress.      Breath sounds: Normal breath sounds.   Chest:      Chest wall: No tenderness.   Abdominal:      General: Bowel sounds are normal.      Palpations: Abdomen is soft.      Tenderness: There is no abdominal tenderness.   Musculoskeletal:         General: No swelling. Normal range of motion.      Cervical back:  "Normal range of motion.      Right lower leg: Edema present.      Left lower leg: Edema present.   Skin:     General: Skin is warm and dry.   Neurological:      Mental Status: He is alert and oriented to person, place, and time. Mental status is at baseline.      Motor: No weakness.   Psychiatric:         Mood and Affect: Mood normal.         Behavior: Behavior normal.         Thought Content: Thought content normal.            Laboratory results / Imaging reviewed: Hard copy/ies in medical chart:    Vitals:    07/15/24 1910   BP: 151/54   Pulse: 70   Resp: 18   Temp: (!) 97 °F (36.1 °C)   SpO2: 97%       Current Medications:  All medications reviewed and updated in Nursing Home Chart    Please note: This note was completed in part utilizing a voice-recognition software may have been used in the preparation of this document. Grammatical errors, random word insertion, spelling mistakes, and incomplete sentences may be an occasional consequence of the system secondary to software limitations, ambient noise and hardware issues. Occasional wrong word or \"sound-alike\" substitutions may have occurred due to the inherent limitations of voice recognition software. At the time of dictation, efforts were made to edit, clarify and/or correct errors. Interpretation should be guided by context. Please read the chart carefully and recognize, using context, where substitutions have occurred. If you have any questions or concerns about the context, text or information contained within the body of this dictation, please contact myself, the provider, for further clarification.      VICKI Elizabeth  7/15/2024  "

## 2024-07-15 NOTE — ASSESSMENT & PLAN NOTE
Weight increased  More due to appetite  Does not exceed fluid restrictions   No signs of fluid overload  Lungs clear, no SOB  Chronic lower extremity edema  Encourage use of knee high TEDS stockings  Non-compliant  VSS  Continue torsemide 40 mg po daily  Changed to daily from BID at resident's request  Continue salt restricted diet  Follow up with cardiologist

## 2024-08-03 ENCOUNTER — TELEPHONE (OUTPATIENT)
Dept: OTHER | Facility: OTHER | Age: 79
End: 2024-08-03

## 2024-08-08 ENCOUNTER — NURSING HOME VISIT (OUTPATIENT)
Dept: GERIATRICS | Facility: OTHER | Age: 79
End: 2024-08-08
Payer: COMMERCIAL

## 2024-08-08 DIAGNOSIS — I10 ESSENTIAL HYPERTENSION: ICD-10-CM

## 2024-08-08 DIAGNOSIS — D63.8 ANEMIA, CHRONIC DISEASE: ICD-10-CM

## 2024-08-08 DIAGNOSIS — E11.22 TYPE 2 DIABETES MELLITUS WITH STAGE 3B CHRONIC KIDNEY DISEASE, WITH LONG-TERM CURRENT USE OF INSULIN (HCC): Primary | ICD-10-CM

## 2024-08-08 DIAGNOSIS — N18.32 STAGE 3B CHRONIC KIDNEY DISEASE (CKD) (HCC): ICD-10-CM

## 2024-08-08 DIAGNOSIS — K21.9 GASTROESOPHAGEAL REFLUX DISEASE, UNSPECIFIED WHETHER ESOPHAGITIS PRESENT: ICD-10-CM

## 2024-08-08 DIAGNOSIS — R26.2 AMBULATORY DYSFUNCTION: ICD-10-CM

## 2024-08-08 DIAGNOSIS — H54.61 VISION LOSS OF RIGHT EYE: ICD-10-CM

## 2024-08-08 DIAGNOSIS — F51.01 PRIMARY INSOMNIA: ICD-10-CM

## 2024-08-08 DIAGNOSIS — N18.32 TYPE 2 DIABETES MELLITUS WITH STAGE 3B CHRONIC KIDNEY DISEASE, WITH LONG-TERM CURRENT USE OF INSULIN (HCC): Primary | ICD-10-CM

## 2024-08-08 DIAGNOSIS — Z79.4 TYPE 2 DIABETES MELLITUS WITH STAGE 3B CHRONIC KIDNEY DISEASE, WITH LONG-TERM CURRENT USE OF INSULIN (HCC): Primary | ICD-10-CM

## 2024-08-08 DIAGNOSIS — J41.0 SIMPLE CHRONIC BRONCHITIS (HCC): ICD-10-CM

## 2024-08-08 DIAGNOSIS — I25.10 CORONARY ARTERY DISEASE INVOLVING NATIVE CORONARY ARTERY OF NATIVE HEART WITHOUT ANGINA PECTORIS: ICD-10-CM

## 2024-08-08 DIAGNOSIS — I50.32 CHRONIC DIASTOLIC HEART FAILURE (HCC): ICD-10-CM

## 2024-08-08 PROBLEM — L03.90 CELLULITIS: Status: RESOLVED | Noted: 2024-04-29 | Resolved: 2024-08-08

## 2024-08-08 PROCEDURE — 99309 SBSQ NF CARE MODERATE MDM 30: CPT | Performed by: STUDENT IN AN ORGANIZED HEALTH CARE EDUCATION/TRAINING PROGRAM

## 2024-08-08 NOTE — ASSESSMENT & PLAN NOTE
Lab Results   Component Value Date    EGFR 56 (L) 11/28/2023    EGFR 48 (L) 11/07/2023    EGFR 49 (L) 10/24/2023    CREATININE 1.28 11/28/2023    CREATININE 1.47 (H) 11/07/2023    CREATININE 1.44 (H) 10/24/2023     eGFR (baseline seems 30s-40s)  Seems consistent with CKD3b likely in setting of diabetes  Monitor renal function on routine labs - stable  Avoid nephrotoxins, NSAIDs as able  Encourage PO hydration, respecting volume status

## 2024-08-08 NOTE — ASSESSMENT & PLAN NOTE
Lab Results   Component Value Date    HGBA1C 6.0 (H) 08/24/2023     A1c very well controlled for age, recently 6.2  Monitor glucose - fasting sugars generally mid-high 100s  Avoid hypoglycemia  Continue insulin glargine - will increase from 20u to 22u daily as fasting sugars a bit above goal  Monitor A1c routinely

## 2024-08-08 NOTE — ASSESSMENT & PLAN NOTE
Stable, notes sleeping well lately and even better since diuretic was retimed to morning  Continue melatonin 5mg  Also on trazodone, previously was weaned from 150mg to 100mg dose, again will trial wean to 50mg dose at this time  Encourage sleep hygiene  Continue to monitor

## 2024-08-08 NOTE — PROGRESS NOTES
[facility]  Facility: [facility]    PROGRESS NOTE  Nursing Home Place of Service: nursing home place of service: POS 32 Unskilled- No Part A Coverage    NAME: Laureano Adair  : 1945 AGE: 79 y.o. SEX: male MRN: [id number]  DATE OF ENCOUNTER: 2024    Assessment and Plan     Problem List Items Addressed This Visit       Chronic diastolic heart failure (HCC)     Wt Readings from Last 3 Encounters:   07/15/24 104 kg (229 lb 6.4 oz)   24 98 kg (216 lb)   24 98.2 kg (216 lb 9.6 oz)       Monitor daily weight - fairly stable, some very slight gradual uptrend which appears to be primarily related to good appetite and eating well rather than volume overload  Monitor volume status clinically - seems stable, peripheral edema fairly mild and stable/overall improved, no orthopnea, no alarm symptoms of CHF exacerbation  Encourage use of knee high TEDS stockings, he is wearing them  Continue torsemide 40mg daily, continue to monitor and adjust dose as appropriate  Follow up with Cardiology, presently appears to have Cardiology appointment in Dec 2024             Chronic obstructive pulmonary disease (HCC)     Seems to be quite mild, patient denies any recent breathing concerns  Has not been requiring any breathing treatments/inhalers  Continue to monitor  10/2013 outpatient PFTs reportedly showed mild obstruction  Consider outpatient PFTs with pulmonary as appropriate         Essential hypertension     Monitor BP - generally controlled/stable around 110s-130s systolic  Pulse generally 60s-70s, sometimes borderline bradycardic  No acute cardiac complaints  Avoid hypotension  Continue regimen including carvedilol 1.56mg BID, terazosin 1mg, torsemide 40mg daily  Consider adjusting/weaning regimen as appropriate  Follow up with Cardiology         GERD (gastroesophageal reflux disease)     Stable per patient  -monitor off medications  -recommend OOB with meals, sit upright for at least 30  minutes afterwards, avoid trigger foods  -continue to monitor  -follow up with GI as needed         Type 2 diabetes mellitus (HCC) - Primary       Lab Results   Component Value Date    HGBA1C 6.0 (H) 08/24/2023     A1c very well controlled for age, recently 6.2  Monitor glucose - fasting sugars generally mid-high 100s  Avoid hypoglycemia  Continue insulin glargine - will increase from 20u to 22u daily as fasting sugars a bit above goal  Monitor A1c routinely         Vision loss of right eye     Patient has noted chronic stable central vision loss with intact peripheral vision in R eye  No acute change or associated pain  Interested in seeing Ophthalmology for what is potentially macular degeneration  Will discuss again with team to set up Ophtho consult for patient, does not appear to be scheduled as yet  Continue to monitor for acute change         Stage 3b chronic kidney disease (CKD) (formerly [facility])     Lab Results   Component Value Date    EGFR 56 (L) 11/28/2023    EGFR 48 (L) 11/07/2023    EGFR 49 (L) 10/24/2023    CREATININE 1.28 11/28/2023    CREATININE 1.47 (H) 11/07/2023    CREATININE 1.44 (H) 10/24/2023     eGFR (baseline seems 30s-40s)  Seems consistent with CKD3b likely in setting of diabetes  Monitor renal function on routine labs - stable  Avoid nephrotoxins, NSAIDs as able  Encourage PO hydration, respecting volume status         Ambulatory dysfunction     Mainly uses wheelchair, at times rollator at baseline  Denies recent falls  -PT/OT as appropriate  -fall precautions  -encourage appropriate DME use         Anemia, chronic disease     Likely multifactorial in setting of CKD and suspect iron deficiency  Continues on iron supplementation  -monitor on routine labs - stable  -monitor for acute bleed - no present signs  -consider further workup, Heme consult if persistent/worsening  -transfuse PRN Hb <7         Primary insomnia     Stable, notes sleeping well lately and even better since diuretic was retimed to  morning  Continue melatonin 5mg  Also on trazodone, previously was weaned from 150mg to 100mg dose, again will trial wean to 50mg dose at this time  Encourage sleep hygiene  Continue to monitor         Coronary artery disease involving native coronary artery of native heart without angina pectoris     Per records, history of CAD resulting in an anterior wall myocardial infarction following which he underwent coronary artery bypass surgery in 1997  No acute cardiac complaints  Used to be on Xarelto, appears stopped due to hx of bleed per records  Continue aspirin, statin, carvedilol  Follow up with Cardiology                  Chief Complaint     Follow up 60 day    History of Present Illness     Laureano Adair is a 79 y.o. male who was seen today for follow up. PMH including CHF, atherosclerotic heart disease, hypertension, BPH, DM type 2, CKD, vitamin D deficiency, and COPD       Patient seen and examined in room  Others present for encounter: none  Patient seated in chair (wheelchair) able to self-propel, working on his laptop in room  Appears comfortable, awake, alert, oriented to situation, able to converse appropriately  Patient polite, in good spirits, Aox3, appears to be a reasonable historian, mildly [place]. He notes he feels well overall recently, feels happy and well cared for, feels his needs are being mete at facility and presently with no acute concerns. Periphral edema he feels has been improved overall and stable recently. Very happy with recent change of diuretic timing to morning, notes he is not having to get up as much to urinate overnight and has been sleeping well through the night. No new/acute visual concerns, but has chronic stable right eye central vision loss without acute change or pain, interested in seeing Ophtho. Breathing fine, no acute respiratory symptoms or orthopnea. Appetite quite good, denies swallowing issues, no abd pain/N/V, endorses regular easy BM recently. No acute pain  anywhere, notes chronic bilateral knee pain (hx of bilat knee replacement) which is stable and he does not feel he needs anything more for it presently. No acute cardiopulmonary, abdominal, or urinary symptoms; see ROS for more details. Generally gets around using wheelchair or rollator and denies recent falls.     No further questions or acute concerns identified.  No acute concerns per NP and nursing.     Lab Review:   12/18/23: CBC with Hb 9.5; CMP with GFR 51; uric acid 9.4; TSH WNL; A1c from Aug 2023 was 6.0  1/3/24: BMP with Cr 1.63 (baseline seems 1.5-1.8), eGFR 42 (baseline seems 30s-40s)  2/7/24: CBC with Hb 9.4 (stable, normocytic); BMP with Cr 1.78, eGFR 38; A1c 6.2  2/21/24: BMP with Cr 1.65, eGFR 41  6/10/24: BMP with Cr 1.62, eGFR 42; CBC with Hb 10.6  6/18/24: BMP with Cr 1.66, eGFR 41; CBC with Hb 10.3           Echo Oct 2023: difficult study, EF not reported, systolic function overall preserved, indeterminate diastolic dysfunction    The following portions of the patient's history were reviewed and updated as appropriate: allergies, current medications, past family history, past medical history, past social history, past surgical history and problem list.    Review of Systems     Review of Systems   Constitutional:  Negative for appetite change, chills, diaphoresis and fever.   HENT:  Positive for hearing loss (mild, chronic, stable). Negative for drooling, ear pain, rhinorrhea, sore throat and trouble swallowing.    Eyes:  Negative for pain, discharge, redness, itching and visual disturbance (chronic R eye central vision loss stable).   Respiratory:  Negative for cough, chest tightness, shortness of breath and wheezing.    Cardiovascular:  Positive for leg swelling (chronic, stable/improved). Negative for chest pain and palpitations.   Gastrointestinal:  Negative for abdominal pain, blood in stool, constipation, diarrhea, nausea and vomiting.   Genitourinary:  Negative for difficulty urinating,  dysuria, flank pain and hematuria.   Musculoskeletal:  Positive for gait problem. Negative for arthralgias.   Skin:  Negative for color change.   Neurological:  Negative for dizziness, tremors, seizures, facial asymmetry and headaches.   Psychiatric/Behavioral:  Negative for agitation, behavioral problems and confusion. The patient is not nervous/anxious and is not hyperactive.        Active Problem List     Patient Active Problem List   Diagnosis    Atrial flutter (HCC)    Chronic diastolic heart failure (HCC)    Chronic obstructive pulmonary disease (HCC)    Essential hypertension    GERD (gastroesophageal reflux disease)    Obstructive sleep apnea    Type 2 diabetes mellitus (HCC)    Chronic right-sided low back pain without sciatica    Hyponatremia    Morbid obesity (HCC)    Myocardial injury    Secondary hyperparathyroidism (HCC)    Stage 3a chronic kidney disease (HCC)    Thrombocytopenia (HCC)    Vision loss of right eye    Left wrist pain    Stage 3b chronic kidney disease (CKD) (HCC)    Ambulatory dysfunction    Chronic constipation    Advance care planning    Anemia, chronic disease    Primary insomnia    Coronary artery disease involving native coronary artery of native heart without angina pectoris       Objective     Vital Signs:     BP: 138/70 mmHg  8/7/2024 08:09   Temp:97 °F  8/6/2024 20:07 Pulse:68 bpm  8/7/2024 08:08   Weight:231 Lbs  8/8/2024 11:53   Resp:19 Breaths/min  8/6/2024 20:07 BS:150 mg/dL  8/8/2024 07:51 O2:97 %  8/6/2024 20:07 Pain:0  8/8/2024 09:02       Physical Exam  Vitals reviewed.   Constitutional:       General: He is not in acute distress.     Appearance: He is obese. He is not toxic-appearing or diaphoretic.   HENT:      Head: Normocephalic and atraumatic.      Right Ear: External ear normal.      Left Ear: External ear normal.      Nose: Nose normal. No rhinorrhea.      Mouth/Throat:      Mouth: Mucous membranes are dry.      Pharynx: Oropharynx is clear. No oropharyngeal  exudate or posterior oropharyngeal erythema.   Eyes:      General: No scleral icterus.        Right eye: No discharge.         Left eye: No discharge.      Extraocular Movements: Extraocular movements intact.      Conjunctiva/sclera: Conjunctivae normal.      Pupils: Pupils are equal, round, and reactive to light.   Cardiovascular:      Rate and Rhythm: Normal rate and regular rhythm.   Pulmonary:      Effort: Pulmonary effort is normal. No respiratory distress.      Breath sounds: No wheezing or rales.   Abdominal:      General: Bowel sounds are normal.      Palpations: Abdomen is soft.      Tenderness: There is no abdominal tenderness. There is no guarding.   Musculoskeletal:         General: Swelling present. No tenderness.      Cervical back: No rigidity.      Comments: Trace-1+ bilateral lower extremity edema (appears stable/improved from prior), no noted open wound/active drainage/erythema   Skin:     General: Skin is warm and dry.      Coloration: Skin is not jaundiced.   Neurological:      General: No focal deficit present.      Mental Status: He is alert and oriented to person, place, and time. Mental status is at baseline.   Psychiatric:         Mood and Affect: Mood normal.         Behavior: Behavior normal.         Thought Content: Thought content normal.         Judgment: Judgment normal.         Pertinent Laboratory/Diagnostic Studies:  Laboratory and Imaging studies reviewed. Full report in the paper chart.     Current Medications   Medications reviewed and updated in facility chart.      -Total time spent on this encounter today including documentation and workup review, face to face time, history and exam, and documentation/orders was approximately 40 minutes.  -This note will be copied to [facility] EMR where vitals and medication orders are placed.    Raúl Clemente D.O.  Geriatric Medicine  8/8/2024 2:46 PM

## 2024-08-08 NOTE — ASSESSMENT & PLAN NOTE
Monitor BP - generally controlled/stable around 110s-130s systolic  Pulse generally 60s-70s, sometimes borderline bradycardic  No acute cardiac complaints  Avoid hypotension  Continue regimen including carvedilol 1.56mg BID, terazosin 1mg, torsemide 40mg daily  Consider adjusting/weaning regimen as appropriate  Follow up with Cardiology

## 2024-08-08 NOTE — ASSESSMENT & PLAN NOTE
Patient has noted chronic stable central vision loss with intact peripheral vision in R eye  No acute change or associated pain  Interested in seeing Ophthalmology for what is potentially macular degeneration  Will discuss again with team to set up Ophtho consult for patient, does not appear to be scheduled as yet  Continue to monitor for acute change

## 2024-08-08 NOTE — ASSESSMENT & PLAN NOTE
Wt Readings from Last 3 Encounters:   07/15/24 104 kg (229 lb 6.4 oz)   05/03/24 98 kg (216 lb)   03/17/24 98.2 kg (216 lb 9.6 oz)       Monitor daily weight - fairly stable, some very slight gradual uptrend which appears to be primarily related to good appetite and eating well rather than volume overload  Monitor volume status clinically - seems stable, peripheral edema fairly mild and stable/overall improved, no orthopnea, no alarm symptoms of CHF exacerbation  Encourage use of knee high TEDS stockings, he is wearing them  Continue torsemide 40mg daily, continue to monitor and adjust dose as appropriate  Follow up with Cardiology, presently appears to have Cardiology appointment in Dec 2024

## 2024-08-21 ENCOUNTER — TELEPHONE (OUTPATIENT)
Dept: OTHER | Facility: OTHER | Age: 79
End: 2024-08-21

## 2024-08-22 ENCOUNTER — NURSING HOME VISIT (OUTPATIENT)
Dept: GERIATRICS | Facility: OTHER | Age: 79
End: 2024-08-22
Payer: COMMERCIAL

## 2024-08-22 DIAGNOSIS — I50.32 CHRONIC DIASTOLIC HEART FAILURE (HCC): ICD-10-CM

## 2024-08-22 DIAGNOSIS — U07.1 COVID: Primary | ICD-10-CM

## 2024-08-22 DIAGNOSIS — N18.32 TYPE 2 DIABETES MELLITUS WITH STAGE 3B CHRONIC KIDNEY DISEASE, WITH LONG-TERM CURRENT USE OF INSULIN (HCC): ICD-10-CM

## 2024-08-22 DIAGNOSIS — Z79.4 TYPE 2 DIABETES MELLITUS WITH STAGE 3B CHRONIC KIDNEY DISEASE, WITH LONG-TERM CURRENT USE OF INSULIN (HCC): ICD-10-CM

## 2024-08-22 DIAGNOSIS — E11.22 TYPE 2 DIABETES MELLITUS WITH STAGE 3B CHRONIC KIDNEY DISEASE, WITH LONG-TERM CURRENT USE OF INSULIN (HCC): ICD-10-CM

## 2024-08-22 PROCEDURE — 99308 SBSQ NF CARE LOW MDM 20: CPT

## 2024-09-06 ENCOUNTER — NURSING HOME VISIT (OUTPATIENT)
Dept: GERIATRICS | Facility: OTHER | Age: 79
End: 2024-09-06
Payer: COMMERCIAL

## 2024-09-06 DIAGNOSIS — Z79.4 TYPE 2 DIABETES MELLITUS WITH STAGE 3B CHRONIC KIDNEY DISEASE, WITH LONG-TERM CURRENT USE OF INSULIN (HCC): Primary | ICD-10-CM

## 2024-09-06 DIAGNOSIS — E11.22 TYPE 2 DIABETES MELLITUS WITH STAGE 3B CHRONIC KIDNEY DISEASE, WITH LONG-TERM CURRENT USE OF INSULIN (HCC): Primary | ICD-10-CM

## 2024-09-06 DIAGNOSIS — N18.32 TYPE 2 DIABETES MELLITUS WITH STAGE 3B CHRONIC KIDNEY DISEASE, WITH LONG-TERM CURRENT USE OF INSULIN (HCC): Primary | ICD-10-CM

## 2024-09-06 DIAGNOSIS — N18.31 STAGE 3A CHRONIC KIDNEY DISEASE (HCC): ICD-10-CM

## 2024-09-06 DIAGNOSIS — R26.2 AMBULATORY DYSFUNCTION: ICD-10-CM

## 2024-09-06 DIAGNOSIS — F51.01 PRIMARY INSOMNIA: ICD-10-CM

## 2024-09-06 DIAGNOSIS — I50.32 CHRONIC DIASTOLIC HEART FAILURE (HCC): ICD-10-CM

## 2024-09-06 PROCEDURE — 99309 SBSQ NF CARE MODERATE MDM 30: CPT

## 2024-09-22 ENCOUNTER — TELEPHONE (OUTPATIENT)
Dept: OTHER | Facility: OTHER | Age: 79
End: 2024-09-22

## 2024-09-22 NOTE — TELEPHONE ENCOUNTER
Leidy from [facility] called reporting pt thinks he has gout on left hand in index finger and it is really sore. He has a history of getting gout.    On call notified via epic chat

## 2024-09-23 ENCOUNTER — NURSING HOME VISIT (OUTPATIENT)
Dept: GERIATRICS | Facility: OTHER | Age: 79
End: 2024-09-23
Payer: COMMERCIAL

## 2024-09-23 VITALS — SYSTOLIC BLOOD PRESSURE: 131 MMHG | DIASTOLIC BLOOD PRESSURE: 73 MMHG | HEART RATE: 66 BPM | TEMPERATURE: 97.9 F

## 2024-09-23 DIAGNOSIS — N18.32 STAGE 3B CHRONIC KIDNEY DISEASE (CKD) (HCC): ICD-10-CM

## 2024-09-23 DIAGNOSIS — M10.9 ACUTE GOUT OF LEFT HAND, UNSPECIFIED CAUSE: Primary | ICD-10-CM

## 2024-09-23 PROCEDURE — 99309 SBSQ NF CARE MODERATE MDM 30: CPT

## 2024-09-23 RX ORDER — COLCHICINE 0.6 MG/1
TABLET ORAL
Start: 2024-09-23 | End: 2024-09-26

## 2024-09-23 NOTE — ASSESSMENT & PLAN NOTE
C/o left index finger pain   Uric acid level 8.9  Index finger with swelling and tenderness around 2nd knuckle   Currently on allopurinol   Will start Colchicine, give 1.2 once followed by 0.6 mg 1 hour later   Starting tomorrow give 0.6 mg daily x 3 days   Continue tylenol for pain control   Repeat BMP next week

## 2024-09-23 NOTE — ASSESSMENT & PLAN NOTE
Lab Results   Component Value Date    EGFR 56 (L) 11/28/2023    EGFR 48 (L) 11/07/2023    EGFR 49 (L) 10/24/2023    CREATININE 1.28 11/28/2023    CREATININE 1.47 (H) 11/07/2023    CREATININE 1.44 (H) 10/24/2023     Colchicine order for gout  Repeat BMP next week to monitor renal function

## 2024-09-23 NOTE — PROGRESS NOTES
[facility]  Camille Garzawn  [address], [place], [place]  [phone]  [facility] POS [address]  Acute visit    NAME: Laureano Adair  AGE: 79 y.o. SEX: male  : 1945     DATE: 2024    CODE STATUS: No CPR     Assessment and Plan:     Problem List Items Addressed This Visit       Stage 3b chronic kidney disease (CKD) (HCC)     Lab Results   Component Value Date    EGFR 56 (L) 2023    EGFR 48 (L) 2023    EGFR 49 (L) 10/24/2023    CREATININE 1.28 2023    CREATININE 1.47 (H) 2023    CREATININE 1.44 (H) 10/24/2023     Colchicine order for gout  Repeat BMP next week to monitor renal function          Acute gout of left hand - Primary     C/o left index finger pain   Uric acid level 8.9  Index finger with swelling and tenderness around 2nd knuckle   Currently on allopurinol   Will start Colchicine, give 1.2 once followed by 0.6 mg 1 hour later   Starting tomorrow give 0.6 mg daily x 3 days   Continue tylenol for pain control   Repeat BMP next week          Relevant Medications    colchicine (COLCRYS) 0.6 mg tablet          History of Present Illness:     Patient being seen for acute complaints of left index finger pain and swelling. Patient has history of gout and is currently on allopurinol. Uric acid level is elevated today. He reports tenderness on palpation to 2nd knuckle. Reports tylenol does not help pain. Denies any other affected joints.         The following portions of the patient's history were reviewed and updated as appropriate: allergies, current medications, past family history, past medical history, past social history, past surgical history and problem list.     Review of Systems:     Review of Systems   Musculoskeletal:         Left index finger pain, swelling    All other systems reviewed and are negative.       Problem List:     Patient Active Problem List   Diagnosis    Atrial flutter (HCC)    Chronic diastolic heart failure (HCC)    Chronic  obstructive pulmonary disease (HCC)    Essential hypertension    GERD (gastroesophageal reflux disease)    Obstructive sleep apnea    Type 2 diabetes mellitus (HCC)    Chronic right-sided low back pain without sciatica    Hyponatremia    Morbid obesity (HCC)    Myocardial injury    Secondary hyperparathyroidism (HCC)    Stage 3a chronic kidney disease (HCC)    Thrombocytopenia (HCC)    Vision loss of right eye    Left wrist pain    Stage 3b chronic kidney disease (CKD) (HCC)    Ambulatory dysfunction    Chronic constipation    Advance care planning    Anemia, chronic disease    Primary insomnia    Coronary artery disease involving native coronary artery of native heart without angina pectoris    Acute gout of left hand        Objective:     /73   Pulse 66   Temp 97.9 °F (36.6 °C)     Physical Exam  Vitals and nursing note reviewed.   Constitutional:       General: He is not in acute distress.     Appearance: He is well-developed.   HENT:      Head: Normocephalic and atraumatic.   Eyes:      Conjunctiva/sclera: Conjunctivae normal.   Cardiovascular:      Rate and Rhythm: Normal rate and regular rhythm.      Heart sounds: No murmur heard.  Pulmonary:      Effort: Pulmonary effort is normal. No respiratory distress.      Breath sounds: Normal breath sounds.   Abdominal:      Palpations: Abdomen is soft.      Tenderness: There is no abdominal tenderness.   Musculoskeletal:         General: No swelling.      Left hand: Swelling and tenderness present. Decreased range of motion.      Cervical back: Neck supple.      Comments: L index finger, tender at 2nd knuckle   Skin:     General: Skin is warm and dry.      Capillary Refill: Capillary refill takes less than 2 seconds.   Neurological:      Mental Status: He is alert.   Psychiatric:         Mood and Affect: Mood normal.         Pertinent Laboratory/Diagnostic Studies:    Laboratory Results: I have personally reviewed the pertinent laboratory results/reports      Radiology/Other Diagnostic Testing Results: No pertinent imaging studies reviewed.    VICKI Dangelo  Geriatric Medicine

## 2024-10-02 ENCOUNTER — NURSING HOME VISIT (OUTPATIENT)
Dept: GERIATRICS | Facility: OTHER | Age: 79
End: 2024-10-02
Payer: COMMERCIAL

## 2024-10-02 DIAGNOSIS — R26.2 AMBULATORY DYSFUNCTION: ICD-10-CM

## 2024-10-02 DIAGNOSIS — E11.22 TYPE 2 DIABETES MELLITUS WITH STAGE 3B CHRONIC KIDNEY DISEASE, WITH LONG-TERM CURRENT USE OF INSULIN (HCC): ICD-10-CM

## 2024-10-02 DIAGNOSIS — I10 ESSENTIAL HYPERTENSION: ICD-10-CM

## 2024-10-02 DIAGNOSIS — J41.0 SIMPLE CHRONIC BRONCHITIS (HCC): ICD-10-CM

## 2024-10-02 DIAGNOSIS — M1A.39X0 CHRONIC GOUT DUE TO RENAL IMPAIRMENT OF MULTIPLE SITES WITHOUT TOPHUS: ICD-10-CM

## 2024-10-02 DIAGNOSIS — N18.32 STAGE 3B CHRONIC KIDNEY DISEASE (CKD) (HCC): ICD-10-CM

## 2024-10-02 DIAGNOSIS — K21.9 GASTROESOPHAGEAL REFLUX DISEASE, UNSPECIFIED WHETHER ESOPHAGITIS PRESENT: ICD-10-CM

## 2024-10-02 DIAGNOSIS — I50.32 CHRONIC DIASTOLIC HEART FAILURE (HCC): Primary | ICD-10-CM

## 2024-10-02 DIAGNOSIS — N18.32 TYPE 2 DIABETES MELLITUS WITH STAGE 3B CHRONIC KIDNEY DISEASE, WITH LONG-TERM CURRENT USE OF INSULIN (HCC): ICD-10-CM

## 2024-10-02 DIAGNOSIS — K59.09 CHRONIC CONSTIPATION: ICD-10-CM

## 2024-10-02 DIAGNOSIS — F51.01 PRIMARY INSOMNIA: ICD-10-CM

## 2024-10-02 DIAGNOSIS — I25.10 CORONARY ARTERY DISEASE INVOLVING NATIVE CORONARY ARTERY OF NATIVE HEART WITHOUT ANGINA PECTORIS: ICD-10-CM

## 2024-10-02 DIAGNOSIS — D63.8 ANEMIA, CHRONIC DISEASE: ICD-10-CM

## 2024-10-02 DIAGNOSIS — Z79.4 TYPE 2 DIABETES MELLITUS WITH STAGE 3B CHRONIC KIDNEY DISEASE, WITH LONG-TERM CURRENT USE OF INSULIN (HCC): ICD-10-CM

## 2024-10-02 DIAGNOSIS — R21 RASH OF BACK: ICD-10-CM

## 2024-10-02 PROCEDURE — 99309 SBSQ NF CARE MODERATE MDM 30: CPT | Performed by: STUDENT IN AN ORGANIZED HEALTH CARE EDUCATION/TRAINING PROGRAM

## 2024-10-03 PROBLEM — R21 RASH OF BACK: Status: ACTIVE | Noted: 2024-10-03

## 2024-10-03 PROBLEM — M1A.39X0 CHRONIC GOUT DUE TO RENAL IMPAIRMENT OF MULTIPLE SITES WITHOUT TOPHUS: Status: ACTIVE | Noted: 2024-10-03

## 2024-10-03 NOTE — ASSESSMENT & PLAN NOTE
Patient notes longstanding upper back itching, he is not certain of timeframe but thinks it is a bit worse but overall stable recently  With some white lesions over back. May be consistent with tinea versicolor given he does sweat and this may have developed over summer months/heat  His itching is mainly at the site of a small red excoriation at the right upper aspect of the back (possibly related to scratching at the rash)  He has been getting topical miconazole  He requests benadryl daily, had extensive discussion/education regarding possible risks of anticholinergics, with shared decision-making will allow low-dose hydroxyzine daily PRN to begin with and he also has allegra fexofenadine ordered  Will trial topical hydrocortisone to upper back for 2 weeks  Continue to monitor. Consider further workup, Dermatology consult if issue persists/worsens

## 2024-10-03 NOTE — ASSESSMENT & PLAN NOTE
Noted hx of gout  No present acute/associated complaints. Did appear to have a recent flare involving finger in Sept 2024 treated with colchicine, flare seems resolved  Continue allopurinol  Continue to monitor

## 2024-10-03 NOTE — ASSESSMENT & PLAN NOTE
Wt Readings from Last 3 Encounters:   07/15/24 104 kg (229 lb 6.4 oz)   05/03/24 98 kg (216 lb)   03/17/24 98.2 kg (216 lb 9.6 oz)       Monitor daily weight - fairly stable, no alarming uptrend  Monitor volume status clinically - seems stable, peripheral edema fairly mild and stable/overall improved, no orthopnea, no alarm symptoms of CHF exacerbation  Encourage use of knee high TEDS stockings, he is wearing them  Continue torsemide 40mg daily, continue to monitor and adjust dose as appropriate  Follow up with Cardiology, presently appears to have Cardiology appointment in Dec 2024

## 2024-10-03 NOTE — ASSESSMENT & PLAN NOTE
Stable, notes sleeping well lately and even better since diuretic was retimed to morning  Continue melatonin 5mg  Also on trazodone, previously was weaned from 150mg to 100mg dose, again will trial wean to 50mg dose at this time (prior order did not appear to carry through last visit)  Encourage sleep hygiene  Continue to monitor

## 2024-10-03 NOTE — ASSESSMENT & PLAN NOTE
Lab Results   Component Value Date    HGBA1C 6.0 (H) 08/24/2023     A1c very well controlled for age, recently 6.2  Monitor glucose - fasting sugars generally low-mid 100s, overall improved from prior with recent insulin titration  Avoid hypoglycemia  Continue insulin glargine 22u daily  Monitor A1c routinely  Encourage lifestyle modifications including healthy diet, weight management, exercise as appropriate  Follow up with Ophthalmology/Podiatry as appropriate

## 2024-10-03 NOTE — ASSESSMENT & PLAN NOTE
At risk due to relative immobility, comorbidities including diabetes, iron supplement  Monitor stool output - Patient reports generally BM every few days recently stable but can be hard at times  Bowel regimen at facility as ordered; continue miralax daily, senna 2 tabs daily, add docusate BID; consider bisacodyl suppository PRN if no BM in 2-3 days  Encourage mobility as tolerated, PO hydration as appropriate, high fiber diet/prune juice (in outpatient setting as appropriate)  Goal is for 1 easy BM every 1-2 days

## 2024-10-03 NOTE — PROGRESS NOTES
[facility]  Facility: [facility]    PROGRESS NOTE  Nursing Home Place of Service: nursing home place of service: POS 32 Unskilled- No Part A Coverage    NAME: Laureano Adair  : 1945 AGE: 79 y.o. SEX: male MRN: [id number]  DATE OF ENCOUNTER: 10/2/2024    Assessment and Plan     Problem List Items Addressed This Visit       Chronic diastolic heart failure (HCC) - Primary     Wt Readings from Last 3 Encounters:   07/15/24 104 kg (229 lb 6.4 oz)   24 98 kg (216 lb)   24 98.2 kg (216 lb 9.6 oz)       Monitor daily weight - fairly stable, no alarming uptrend  Monitor volume status clinically - seems stable, peripheral edema fairly mild and stable/overall improved, no orthopnea, no alarm symptoms of CHF exacerbation  Encourage use of knee high TEDS stockings, he is wearing them  Continue torsemide 40mg daily, continue to monitor and adjust dose as appropriate  Follow up with Cardiology, presently appears to have Cardiology appointment in Dec 2024             Chronic obstructive pulmonary disease (HCC)     Seems to be quite mild, patient denies any recent breathing concerns  Has not been requiring any breathing treatments/inhalers  Continue to monitor  10/2013 outpatient PFTs reportedly showed mild obstruction  Consider outpatient PFTs with pulmonary as appropriate         Essential hypertension     Monitor BP - generally controlled/stable around 110s-130s systolic  Pulse generally 60s-70s, sometimes borderline bradycardic  No acute cardiac complaints  Avoid hypotension  Continue regimen including carvedilol 1.56mg BID, terazosin 1mg, torsemide 40mg daily  Consider adjusting/weaning regimen as appropriate  Follow up with Cardiology         GERD (gastroesophageal reflux disease)     Stable per patient  -monitor off medications  -recommend OOB with meals, sit upright for at least 30 minutes afterwards, avoid trigger foods  -continue to monitor  -follow up with GI as needed          Type 2 diabetes mellitus ([facility])       Lab Results   Component Value Date    HGBA1C 6.0 (H) 08/24/2023     A1c very well controlled for age, recently 6.2  Monitor glucose - fasting sugars generally low-mid 100s, overall improved from prior with recent insulin titration  Avoid hypoglycemia  Continue insulin glargine 22u daily  Monitor A1c routinely  Encourage lifestyle modifications including healthy diet, weight management, exercise as appropriate  Follow up with Ophthalmology/Podiatry as appropriate           Stage 3b chronic kidney disease (CKD) ([facility])     Lab Results   Component Value Date    EGFR 56 (L) 11/28/2023    EGFR 48 (L) 11/07/2023    EGFR 49 (L) 10/24/2023    CREATININE 1.28 11/28/2023    CREATININE 1.47 (H) 11/07/2023    CREATININE 1.44 (H) 10/24/2023     eGFR (baseline seems 30s-40s)  Seems consistent with CKD3b likely in setting of diabetes  Monitor renal function on routine labs - stable  Avoid nephrotoxins, NSAIDs as able  Encourage PO hydration, respecting volume status         Ambulatory dysfunction     Mainly uses wheelchair, at times rollator at baseline  Denies recent falls  -PT/OT as appropriate  -fall precautions  -encourage appropriate DME use         Chronic constipation     At risk due to relative immobility, comorbidities including diabetes, iron supplement  Monitor stool output - Patient reports generally BM every few days recently stable but can be hard at times  Bowel regimen at facility as ordered; continue miralax daily, senna 2 tabs daily, add docusate BID; consider bisacodyl suppository PRN if no BM in 2-3 days  Encourage mobility as tolerated, PO hydration as appropriate, high fiber diet/prune juice (in outpatient setting as appropriate)  Goal is for 1 easy BM every 1-2 days         Anemia, chronic disease     Likely multifactorial in setting of CKD and suspect iron deficiency  Continues on iron supplementation  -monitor on routine labs - stable  -monitor for acute bleed - no  present signs  -consider further workup, Heme consult if persistent/worsening  -transfuse PRN Hb <7         Primary insomnia     Stable, notes sleeping well lately and even better since diuretic was retimed to morning  Continue melatonin 5mg  Also on trazodone, previously was weaned from 150mg to 100mg dose, again will trial wean to 50mg dose at this time (prior order did not appear to carry through last visit)  Encourage sleep hygiene  Continue to monitor         Coronary artery disease involving native coronary artery of native heart without angina pectoris     Per records, history of CAD resulting in an anterior wall myocardial infarction following which he underwent coronary artery bypass surgery in 1997  No acute cardiac complaints  Used to be on Xarelto, appears stopped due to hx of bleed per records  Continue aspirin, statin, carvedilol  Follow up with Cardiology         Chronic gout due to renal impairment of multiple sites without tophus     Noted hx of gout  No present acute/associated complaints. Did appear to have a recent flare involving finger in Sept 2024 treated with colchicine, flare seems resolved  Continue allopurinol  Continue to monitor         Rash of back     Patient notes longstanding upper back itching, he is not certain of timeframe but thinks it is a bit worse but overall stable recently  With some white lesions over back. May be consistent with tinea versicolor given he does sweat and this may have developed over summer months/heat  His itching is mainly at the site of a small red excoriation at the right upper aspect of the back (possibly related to scratching at the rash)  He has been getting topical miconazole  He requests benadryl daily, had extensive discussion/education regarding possible risks of anticholinergics, with shared decision-making will allow low-dose hydroxyzine daily PRN to begin with and he also has allegra fexofenadine ordered  Will trial topical hydrocortisone to  upper back for 2 weeks  Continue to monitor. Consider further workup, Dermatology consult if issue persists/worsens                          Chief Complaint     Follow up 60 day    History of Present Illness     Laureano Adair is a 79 y.o. male who was seen today for follow up. PMH including CHF, atherosclerotic heart disease, hypertension, BPH, DM type 2, CKD, vitamin D deficiency, and COPD       Patient seen and examined in room  Others present for encounter: none  Patient seated in chair (wheelchair) able to self-propel, working on his laptop in room  Appears comfortable, awake, alert, oriented to situation, able to converse appropriately  Patient polite, in good spirits, Aox3, appears to be a reasonable historian, mildly [place]. Mentation seems stable compared to prior. He notes he feels well overall recently, feels happy and well cared for, with no notable acute concerns. We discussed he has been having some itching of upper back, he feel it is really a chronic/long-term issue but perhaps a bit worse recently, see plan. Peripheral edema he feels has been improved overall from prior. Very happy with prior change of diuretic timing to morning, notes he is not having to get up as much to urinate overnight and has been sleeping well through the night. Breathing fine, no acute respiratory symptoms or orthopnea. No CP/palpitations or acute orthostasis. Appetite good, denies swallowing issues, no abd pain/N/V, endorses generally regular BM though it can be a bit hard/constipated recently. No acute pain anywhere, notes chronic bilateral knee pain (hx of bilat knee replacement) which is stable and he does not feel he needs anything more for it. No acute cardiopulmonary, abdominal, or urinary symptoms; see ROS for more details. Generally gets around using wheelchair or rollator and denies recent falls.     No further questions or acute concerns identified.  No acute concerns per NP and nursing.     Lab Review:   12/18/23:  CBC with Hb 9.5; CMP with GFR 51; uric acid 9.4; TSH WNL; A1c from Aug 2023 was 6.0  1/3/24: BMP with Cr 1.63 (baseline seems 1.5-1.8), eGFR 42 (baseline seems 30s-40s)  2/7/24: CBC with Hb 9.4 (stable, normocytic); BMP with Cr 1.78, eGFR 38; A1c 6.2  2/21/24: BMP with Cr 1.65, eGFR 41  6/10/24: BMP with Cr 1.62, eGFR 42; CBC with Hb 10.6  6/18/24: BMP with Cr 1.66, eGFR 41; CBC with Hb 10.3  9/23/24: CBC with Hb 10.4; BMP with Cr 1.54, eGFR 45; uric acid 8.9  9/30/24: BMP with Cr 1.58, eGFR 43           Echo Oct 2023: difficult study, EF not reported, systolic function overall preserved, indeterminate diastolic dysfunction    The following portions of the patient's history were reviewed and updated as appropriate: allergies, current medications, past family history, past medical history, past social history, past surgical history and problem list.    Review of Systems     Review of Systems   Constitutional:  Negative for appetite change, chills, diaphoresis and fever.   HENT:  Positive for hearing loss (mild, chronic, stable). Negative for drooling, ear pain, rhinorrhea, sore throat and trouble swallowing.    Eyes:  Negative for pain, discharge, redness, itching and visual disturbance (chronic R eye central vision loss stable).   Respiratory:  Negative for cough, chest tightness, shortness of breath and wheezing.    Cardiovascular:  Positive for leg swelling (chronic, stable/improved). Negative for chest pain and palpitations.   Gastrointestinal:  Positive for constipation. Negative for abdominal pain, blood in stool, diarrhea, nausea and vomiting.   Genitourinary:  Negative for difficulty urinating, dysuria, flank pain and hematuria.   Musculoskeletal:  Positive for gait problem. Negative for arthralgias.   Skin:  Positive for rash. Negative for color change.   Neurological:  Negative for dizziness, tremors, seizures, facial asymmetry and headaches.   Psychiatric/Behavioral:  Negative for agitation, behavioral  problems and confusion. The patient is not nervous/anxious and is not hyperactive.        Active Problem List     Patient Active Problem List   Diagnosis    Atrial flutter (HCC)    Chronic diastolic heart failure (HCC)    Chronic obstructive pulmonary disease (HCC)    Essential hypertension    GERD (gastroesophageal reflux disease)    Obstructive sleep apnea    Type 2 diabetes mellitus (HCC)    Chronic right-sided low back pain without sciatica    Hyponatremia    Morbid obesity (HCC)    Myocardial injury    Secondary hyperparathyroidism (HCC)    Stage 3a chronic kidney disease (HCC)    Thrombocytopenia (HCC)    Vision loss of right eye    Left wrist pain    Stage 3b chronic kidney disease (CKD) (HCC)    Ambulatory dysfunction    Chronic constipation    Advance care planning    Anemia, chronic disease    Primary insomnia    Coronary artery disease involving native coronary artery of native heart without angina pectoris    Acute gout of left hand    Chronic gout due to renal impairment of multiple sites without tophus    Rash of back       Objective     Vital Signs:     BP: 133/55 mmHg  10/2/2024 08:07   Temp:97.7 °F  9/26/2024 01:59 Pulse:64 bpm  10/2/2024 08:07 Weight:225.7 Lbs  10/2/2024 13:46   Resp:18 Breaths/min  9/1/2024 11:57 BS:123 mg/dL  10/2/2024 07:48 O2:96 %  9/1/2024 11:57 Pain:0         Physical Exam  Vitals reviewed.   Constitutional:       General: He is not in acute distress.     Appearance: He is obese. He is not toxic-appearing or diaphoretic.   HENT:      Head: Normocephalic and atraumatic.      Right Ear: External ear normal.      Left Ear: External ear normal.      Nose: Nose normal. No rhinorrhea.      Mouth/Throat:      Mouth: Mucous membranes are dry.      Pharynx: Oropharynx is clear. No oropharyngeal exudate or posterior oropharyngeal erythema.   Eyes:      General: No scleral icterus.        Right eye: No discharge.         Left eye: No discharge.      Extraocular Movements: Extraocular  movements intact.      Conjunctiva/sclera: Conjunctivae normal.      Pupils: Pupils are equal, round, and reactive to light.   Cardiovascular:      Rate and Rhythm: Normal rate and regular rhythm.   Pulmonary:      Effort: Pulmonary effort is normal. No respiratory distress.      Breath sounds: No wheezing or rales.   Abdominal:      General: Bowel sounds are normal.      Palpations: Abdomen is soft.      Tenderness: There is no abdominal tenderness. There is no guarding.   Musculoskeletal:         General: Swelling present. No tenderness.      Cervical back: No rigidity.      Comments: Trace-1+ bilateral lower extremity edema (appears stable/improved from prior), no noted open wound/active drainage/erythema   Skin:     General: Skin is warm and dry.      Coloration: Skin is not jaundiced.      Comments: Upper back with scattered flat whitish lesions   Neurological:      General: No focal deficit present.      Mental Status: He is alert and oriented to person, place, and time. Mental status is at baseline.   Psychiatric:         Mood and Affect: Mood normal.         Behavior: Behavior normal.         Thought Content: Thought content normal.         Judgment: Judgment normal.         Pertinent Laboratory/Diagnostic Studies:  Laboratory and Imaging studies reviewed. Full report in the paper chart.     Current Medications   Medications reviewed and updated in facility chart.      -Total time spent on this encounter today including documentation and workup review, face to face time, history and exam, and documentation/orders was approximately 40 minutes.  -This note will be copied to [facility] EMR where vitals and medication orders are placed.    Raúl Clemente D.O.  Geriatric Medicine  10/3/2024 7:52 AM

## 2024-10-31 ENCOUNTER — NURSING HOME VISIT (OUTPATIENT)
Dept: GERIATRICS | Facility: OTHER | Age: 79
End: 2024-10-31
Payer: COMMERCIAL

## 2024-10-31 DIAGNOSIS — I10 ESSENTIAL HYPERTENSION: ICD-10-CM

## 2024-10-31 DIAGNOSIS — N18.31 STAGE 3A CHRONIC KIDNEY DISEASE (HCC): ICD-10-CM

## 2024-10-31 DIAGNOSIS — I50.32 CHRONIC DIASTOLIC HEART FAILURE (HCC): ICD-10-CM

## 2024-10-31 DIAGNOSIS — J41.0 SIMPLE CHRONIC BRONCHITIS (HCC): ICD-10-CM

## 2024-10-31 DIAGNOSIS — Z79.4 TYPE 2 DIABETES MELLITUS WITH STAGE 3B CHRONIC KIDNEY DISEASE, WITH LONG-TERM CURRENT USE OF INSULIN (HCC): Primary | ICD-10-CM

## 2024-10-31 DIAGNOSIS — K59.09 CHRONIC CONSTIPATION: ICD-10-CM

## 2024-10-31 DIAGNOSIS — R26.2 AMBULATORY DYSFUNCTION: ICD-10-CM

## 2024-10-31 DIAGNOSIS — K21.9 GASTROESOPHAGEAL REFLUX DISEASE, UNSPECIFIED WHETHER ESOPHAGITIS PRESENT: ICD-10-CM

## 2024-10-31 DIAGNOSIS — E11.22 TYPE 2 DIABETES MELLITUS WITH STAGE 3B CHRONIC KIDNEY DISEASE, WITH LONG-TERM CURRENT USE OF INSULIN (HCC): Primary | ICD-10-CM

## 2024-10-31 DIAGNOSIS — N18.32 TYPE 2 DIABETES MELLITUS WITH STAGE 3B CHRONIC KIDNEY DISEASE, WITH LONG-TERM CURRENT USE OF INSULIN (HCC): Primary | ICD-10-CM

## 2024-10-31 PROCEDURE — 99309 SBSQ NF CARE MODERATE MDM 30: CPT

## 2024-11-05 VITALS
HEART RATE: 63 BPM | TEMPERATURE: 97.8 F | OXYGEN SATURATION: 95 % | SYSTOLIC BLOOD PRESSURE: 114 MMHG | DIASTOLIC BLOOD PRESSURE: 80 MMHG | RESPIRATION RATE: 20 BRPM | WEIGHT: 224.7 LBS

## 2024-11-05 PROBLEM — U07.1 COVID: Status: ACTIVE | Noted: 2023-10-13

## 2024-11-05 RX ORDER — SENNOSIDES A AND B 8.6 MG/1
2 TABLET, FILM COATED ORAL
COMMUNITY

## 2024-11-06 NOTE — PROGRESS NOTES
[address], [address], [place] [address]  [phone]    NAME: Laureano Adair  AGE: 79 y.o. SEX: male    Progress Note    Location: [facility]  POS: 32 ([facility])  Code Status: DNR    Chief complaint / Reason for visit: Acute visit-covid positive    Assessment/Plan:    COVID  Testing positive for covid on 08/22  Cough   Denies congestion  Afebrile  Denies SOB  Continue supportive care  Encourage fluids as tolerated  Continue to monitor given co-morbidities of CHF, diabetes     Type 2 diabetes mellitus (HCC)    Lab Results   Component Value Date    HGBA1C 6.0 (H) 08/24/2023     HgA1c on 02/07-6.2  Well controlled based on co-morbidities   Continue to monitor HgA1c Q6 months  Continue insulin glargine 22 units SQ daily  Avoid hypoglycemia    Chronic diastolic heart failure (HCC)  No signs of fluid overload  Lungs clear, no SOB  Chronic lower extremity edema  Encourage use of knee high TEDS stockings  Non-compliant  VSS  Continue torsemide 40 mg po daily  Changed to daily from BID at resident's request  Continue salt restricted diet  Follow up with cardiologist       This is an 79 y.o. male seen today at [facility].  Medical history includes, not limited to, CHF, atherosclerotic heart disease, hypertension, BPH, DM type 2, CKD, vitamin D deficiency, and COPD.    Resident is seen today for an acute visit.  Nursing staff reporting resident has tested positive for covid.  He was tested due to facility protocol, resident experiencing a cough.  Resident is seen today, mainly asymptomatic except for cough.  Denies shortness of breath.  No congestion heard on exam.  Resident remains afebrile at this time.       Reviewed resident with nursing staff.  Reviewed recent labs, vitals and orders.  Will continue to monitor.           Nursing and prior provider notes reviewed on this visit. Discussed visit with PCP and nursing staff/ supervisor.      Review of Systems   Constitutional:   Positive for activity change. Negative for appetite change, fatigue and fever.   HENT:  Positive for hearing loss. Negative for congestion and rhinorrhea.    Eyes:  Negative for pain and visual disturbance.   Respiratory:  Positive for cough. Negative for shortness of breath.    Cardiovascular:  Positive for leg swelling. Negative for chest pain.   Gastrointestinal:  Negative for abdominal pain and constipation.   Genitourinary:  Negative for difficulty urinating and dysuria.   Musculoskeletal:  Positive for gait problem. Negative for arthralgias.   Skin:  Negative for color change and rash.   Neurological:  Negative for dizziness, syncope and weakness.   Psychiatric/Behavioral:  Negative for behavioral problems and confusion.    All other systems reviewed and are negative.         ALLERGY: Reviewed, unchanged  Allergies   Allergen Reactions    Abciximab Other (See Comments)     1/97 rec'd no rxn; Pt unsure of any reaction      Medical Tape        HISTORY:  Medical Hx: Reviewed, unchanged  Family Hx: Reviewed, unchanged  Soc Hx: Reviewed,  unchanged      Physical Exam  Vitals reviewed.   Constitutional:       General: He is not in acute distress.     Appearance: Normal appearance. He is obese. He is not ill-appearing.   HENT:      Head: Normocephalic.      Right Ear: Tympanic membrane normal.      Left Ear: Tympanic membrane normal.      Nose: Nose normal. No congestion.      Mouth/Throat:      Mouth: Mucous membranes are moist.      Pharynx: Oropharynx is clear.   Eyes:      General:         Right eye: No discharge.         Left eye: No discharge.      Extraocular Movements: Extraocular movements intact.      Conjunctiva/sclera: Conjunctivae normal.      Pupils: Pupils are equal, round, and reactive to light.   Cardiovascular:      Rate and Rhythm: Normal rate and regular rhythm.      Pulses: Normal pulses.      Heart sounds: Normal heart sounds.   Pulmonary:      Effort: Pulmonary effort is normal. No respiratory  "distress.      Breath sounds: Normal breath sounds.   Chest:      Chest wall: No tenderness.   Abdominal:      General: Bowel sounds are normal.      Palpations: Abdomen is soft.      Tenderness: There is no abdominal tenderness.   Musculoskeletal:         General: No swelling. Normal range of motion.      Cervical back: Normal range of motion.      Right lower leg: Edema present.      Left lower leg: Edema present.   Skin:     General: Skin is warm and dry.   Neurological:      Mental Status: He is alert and oriented to person, place, and time. Mental status is at baseline.      Motor: No weakness.   Psychiatric:         Mood and Affect: Mood normal.         Behavior: Behavior normal.         Thought Content: Thought content normal.            Laboratory results / Imaging reviewed: Hard copy/ies in medical chart:    Reviewed in [facility].       Current Medications:  All medications reviewed and updated in Nursing Home Chart    Please note: This note was completed in part utilizing a voice-recognition software may have been used in the preparation of this document. Grammatical errors, random word insertion, spelling mistakes, and incomplete sentences may be an occasional consequence of the system secondary to software limitations, ambient noise and hardware issues. Occasional wrong word or \"sound-alike\" substitutions may have occurred due to the inherent limitations of voice recognition software. At the time of dictation, efforts were made to edit, clarify and/or correct errors. Interpretation should be guided by context. Please read the chart carefully and recognize, using context, where substitutions have occurred. If you have any questions or concerns about the context, text or information contained within the body of this dictation, please contact myself, the provider, for further clarification.      VICKI Elizabeth  11/5/2024  "

## 2024-11-06 NOTE — ASSESSMENT & PLAN NOTE
No signs of fluid overload  Lungs clear, no SOB  Chronic lower extremity edema  Encourage use of knee high TEDS stockings  Non-compliant  VSS  Continue torsemide 40 mg po daily  Changed to daily from BID at resident's request  Continue salt restricted diet  Follow up with cardiologist

## 2024-11-06 NOTE — PROGRESS NOTES
[address], [address], [place] [address]  [phone]    NAME: Laureano Adair  AGE: 79 y.o. SEX: male    Progress Note    Location: [facility]  POS: 32 ([facility])  Code Status: DNR    Chief complaint / Reason for visit:  Follow-up visit    Assessment/Plan:    Ambulatory dysfunction  Mainly seen moving around in wheelchair but states he has been trying to utilize his rollator at times  No recent falls  Able to independently complete laundry   Encourage use of assistive device  Continue fall precautions  Encourage patient to participate with activities  Provide education for patient, family, and caregivers      Stage 3a chronic kidney disease (HCC)  Baseline creatinine 1.17-1.54  Recent BMP on 10/23/24  Creatinine-1.71  BUN-40  Slightly above baseline  Recently discontinued Claritin   Continue to monitor periodically   Monitor I/O's  Encourage PO hydration  Monitor for hypertension  Avoid hypotension    Type 2 diabetes mellitus (HCC)    Lab Results   Component Value Date    HGBA1C 6.0 (H) 08/24/2023     HgA1c on 02/07-6.2  Well controlled based on co-morbidities   Continue to monitor HgA1c Q6 months  Continue insulin glargine 22 units SQ daily  Avoid hypoglycemia    Chronic constipation  At risk due to relative immobility, comorbidities including diabetes, iron supplement  Monitor stool output - Patient reports generally BM every few days recently stable but can be hard at times  Bowel regimen at facility as ordered; continue miralax daily, senna 2 tabs daily, add docusate BID; consider bisacodyl suppository PRN if no BM in 2-3 days  Encourage mobility as tolerated, PO hydration as appropriate, high fiber diet/prune juice (in outpatient setting as appropriate)  Goal is for 1 easy BM every 1-2 days    GERD (gastroesophageal reflux disease)  Stable off medications  Continue to monitor  Recommend OOB for all meals, remain upright for at least 30 minutes following meals, avoid  trigger foods    Chronic obstructive pulmonary disease (HCC)  No shortness of breath noted  Continues on RA  Not requiring breathing treatments or inhalers  Follow up with pulmonology as needed    Chronic diastolic heart failure (HCC)  No signs of fluid overload  Lungs clear, no SOB  Chronic lower extremity edema  Encourage use of knee high TEDS stockings  Non-compliant  VSS  Continue torsemide 40 mg po daily  Changed to daily from BID at resident's request  Continue salt restricted diet  Follow up with cardiologist     Essential hypertension  Blood pressures reviewed from facility records  Stable  Continue carvedilol 1.56 mg po BID  Continue terazosin 1 mg po daily at bedtime  Continue torsemide 40 mg po daily  Recently changed to daily at resident's request due to increased urinary frequency   Encourage medication adherence   Continue to monitor blood pressure weekly   Encourage heart healthy diet  Consult and follow up with cardiologist       This is an 79 y.o. male seen today at [facility].  Medical history includes, not limited to, CHF, atherosclerotic heart disease, hypertension, BPH, DM type 2, CKD, vitamin D deficiency, and COPD.    Resident is seen today for a routine follow up visit.  He is out of bed in his wheelchair, in the dining room, getting ready to eat lunch.  Alert and oriented x 3, able to answer questions appropriately.  He denies pain, denies itchiness to his bilateral lower extremities, states the sarna lotion is helping him not to itch.  His legs are looking well, no scratched open areas.  Encouraged him to continue to use the lotion and avoid scratching.  Denies chest pain.  No shortness of breath noted.  States he has been trying to move around more with his rollator.        Reviewed resident with nursing staff.  Reviewed recent labs, vitals and orders.           Nursing and prior provider notes reviewed on this visit. Discussed visit with PCP and nursing staff/  supervisor.      Review of Systems   Constitutional:  Positive for activity change. Negative for appetite change, fatigue and fever.   HENT:  Positive for hearing loss. Negative for congestion and rhinorrhea.    Eyes:  Negative for pain and visual disturbance.   Respiratory:  Negative for cough and shortness of breath.    Cardiovascular:  Positive for leg swelling. Negative for chest pain.   Gastrointestinal:  Negative for abdominal pain and constipation.   Genitourinary:  Negative for difficulty urinating and dysuria.   Musculoskeletal:  Positive for gait problem. Negative for arthralgias.   Skin:  Negative for color change and rash.   Neurological:  Negative for dizziness, syncope and weakness.   Psychiatric/Behavioral:  Negative for behavioral problems and confusion.    All other systems reviewed and are negative.         ALLERGY: Reviewed, unchanged  Allergies   Allergen Reactions    Abciximab Other (See Comments)     1/97 rec'd no rxn; Pt unsure of any reaction      Medical Tape        HISTORY:  Medical Hx: Reviewed, unchanged  Family Hx: Reviewed, unchanged  Soc Hx: Reviewed,  unchanged      Physical Exam  Vitals reviewed.   Constitutional:       General: He is not in acute distress.     Appearance: Normal appearance. He is obese. He is not ill-appearing.   HENT:      Head: Normocephalic.      Right Ear: Tympanic membrane normal.      Left Ear: Tympanic membrane normal.      Nose: Nose normal. No congestion.      Mouth/Throat:      Mouth: Mucous membranes are moist.      Pharynx: Oropharynx is clear.   Eyes:      General:         Right eye: No discharge.         Left eye: No discharge.      Extraocular Movements: Extraocular movements intact.      Conjunctiva/sclera: Conjunctivae normal.      Pupils: Pupils are equal, round, and reactive to light.   Cardiovascular:      Rate and Rhythm: Normal rate and regular rhythm.      Pulses: Normal pulses.      Heart sounds: Normal heart sounds.   Pulmonary:      Effort:  "Pulmonary effort is normal. No respiratory distress.      Breath sounds: Normal breath sounds.   Chest:      Chest wall: No tenderness.   Abdominal:      General: Bowel sounds are normal.      Palpations: Abdomen is soft.      Tenderness: There is no abdominal tenderness.   Musculoskeletal:         General: No swelling. Normal range of motion.      Cervical back: Normal range of motion.      Right lower leg: Edema present.      Left lower leg: Edema present.   Skin:     General: Skin is warm and dry.   Neurological:      Mental Status: He is alert and oriented to person, place, and time. Mental status is at baseline.      Motor: No weakness.   Psychiatric:         Mood and Affect: Mood normal.         Behavior: Behavior normal.         Thought Content: Thought content normal.            Laboratory results / Imaging reviewed: Hard copy/ies in medical chart:    Vitals:    11/05/24 2113   BP: 114/80   Pulse: 63   Resp: 20   Temp: 97.8 °F (36.6 °C)   SpO2: 95%       Current Medications:  All medications reviewed and updated in Nursing Home Chart    Please note: This note was completed in part utilizing a voice-recognition software may have been used in the preparation of this document. Grammatical errors, random word insertion, spelling mistakes, and incomplete sentences may be an occasional consequence of the system secondary to software limitations, ambient noise and hardware issues. Occasional wrong word or \"sound-alike\" substitutions may have occurred due to the inherent limitations of voice recognition software. At the time of dictation, efforts were made to edit, clarify and/or correct errors. Interpretation should be guided by context. Please read the chart carefully and recognize, using context, where substitutions have occurred. If you have any questions or concerns about the context, text or information contained within the body of this dictation, please contact myself, the provider, for further " clarification.      VICKI Elizabeth  11/5/2024

## 2024-11-06 NOTE — ASSESSMENT & PLAN NOTE
No shortness of breath noted  Continues on RA  Not requiring breathing treatments or inhalers  Follow up with pulmonology as needed

## 2024-11-06 NOTE — ASSESSMENT & PLAN NOTE
Mainly seen moving around in wheelchair but states he has been trying to utilize his rollator at times  No recent falls  Able to independently complete laundry   Encourage use of assistive device  Continue fall precautions  Encourage patient to participate with activities  Provide education for patient, family, and caregivers

## 2024-11-06 NOTE — ASSESSMENT & PLAN NOTE
Baseline creatinine 1.17-1.54  Continue to monitor periodically   Monitor I/O's  Encourage PO hydration  Monitor for hypertension  Avoid hypotension  Avoid nephrotoxic medications, NSAIDs

## 2024-11-06 NOTE — ASSESSMENT & PLAN NOTE
Lab Results   Component Value Date    HGBA1C 6.0 (H) 08/24/2023     HgA1c on 02/07-6.2  Well controlled based on co-morbidities   Continue to monitor HgA1c Q6 months  Continue insulin glargine 22 units SQ daily  Avoid hypoglycemia

## 2024-11-06 NOTE — ASSESSMENT & PLAN NOTE
Stable  Has been sleeping well in recliner  Continue melatonin 5mg  Continue trazodone   Encourage sleep hygiene  Continue to monitor

## 2024-11-06 NOTE — PROGRESS NOTES
[address], [address], [place] [address]  [phone]    NAME: Laureano Adair  AGE: 79 y.o. SEX: male    Progress Note    Location: [facility]  POS: 32 ([facility])  Code Status: DNR    Chief complaint / Reason for visit:  Follow-up visit    Assessment/Plan:    Chronic diastolic heart failure (HCC)  No signs of fluid overload  Lungs clear, no SOB  Chronic lower extremity edema  Encourage use of knee high TEDS stockings  Non-compliant  VSS  Continue torsemide 40 mg po daily  Changed to daily from BID at resident's request  Continue salt restricted diet  Follow up with cardiologist     Type 2 diabetes mellitus (HCC)    Lab Results   Component Value Date    HGBA1C 6.0 (H) 08/24/2023     HgA1c on 02/07-6.2  Well controlled based on co-morbidities   Continue to monitor HgA1c Q6 months  Continue insulin glargine 22 units SQ daily  Avoid hypoglycemia    Ambulatory dysfunction  Mainly seen moving around in wheelchair  No recent falls  Able to independently complete laundry   Encourage use of assistive device  Continue fall precautions  Encourage patient to participate with activities  Provide education for patient, family, and caregivers      Primary insomnia  Stable  Has been sleeping well in recliner  Continue melatonin 5mg  Continue trazodone   Encourage sleep hygiene  Continue to monitor    Stage 3a chronic kidney disease (HCC)  Baseline creatinine 1.17-1.54  Continue to monitor periodically   Monitor I/O's  Encourage PO hydration  Monitor for hypertension  Avoid hypotension  Avoid nephrotoxic medications, NSAIDs      This is an 79 y.o. male seen today at [facility].  Medical history includes, not limited to, CHF, atherosclerotic heart disease, hypertension, BPH, DM type 2, CKD, vitamin D deficiency, and COPD.    Resident is seen today for a routine follow up visit.  He is out of bed in his wheelchair.  Alert and oriented x 3, able to answer questions appropriately.   Currently denies pain. Offers no complaints at this time.        Reviewed resident with nursing staff.  Reviewed recent labs, vitals and orders.           Nursing and prior provider notes reviewed on this visit. Discussed visit with PCP and nursing staff/ supervisor.      Review of Systems   Constitutional:  Positive for activity change. Negative for appetite change, fatigue and fever.   HENT:  Positive for hearing loss. Negative for congestion and rhinorrhea.    Eyes:  Negative for pain and visual disturbance.   Respiratory:  Negative for cough and shortness of breath.    Cardiovascular:  Positive for leg swelling. Negative for chest pain.   Gastrointestinal:  Negative for abdominal pain and constipation.   Genitourinary:  Negative for difficulty urinating and dysuria.   Musculoskeletal:  Positive for gait problem. Negative for arthralgias.   Skin:  Negative for color change and rash.   Neurological:  Negative for dizziness, syncope and weakness.   Psychiatric/Behavioral:  Negative for behavioral problems and confusion.    All other systems reviewed and are negative.         ALLERGY: Reviewed, unchanged  Allergies   Allergen Reactions    Abciximab Other (See Comments)     1/97 rec'd no rxn; Pt unsure of any reaction      Medical Tape        HISTORY:  Medical Hx: Reviewed, unchanged  Family Hx: Reviewed, unchanged  Soc Hx: Reviewed,  unchanged      Physical Exam  Vitals reviewed.   Constitutional:       General: He is not in acute distress.     Appearance: Normal appearance. He is obese. He is not ill-appearing.   HENT:      Head: Normocephalic.      Right Ear: Tympanic membrane normal.      Left Ear: Tympanic membrane normal.      Nose: Nose normal. No congestion.      Mouth/Throat:      Mouth: Mucous membranes are moist.      Pharynx: Oropharynx is clear.   Eyes:      General:         Right eye: No discharge.         Left eye: No discharge.      Extraocular Movements: Extraocular movements intact.       "Conjunctiva/sclera: Conjunctivae normal.      Pupils: Pupils are equal, round, and reactive to light.   Cardiovascular:      Rate and Rhythm: Normal rate and regular rhythm.      Pulses: Normal pulses.      Heart sounds: Normal heart sounds.   Pulmonary:      Effort: Pulmonary effort is normal. No respiratory distress.      Breath sounds: Normal breath sounds.   Chest:      Chest wall: No tenderness.   Abdominal:      General: Bowel sounds are normal.      Palpations: Abdomen is soft.      Tenderness: There is no abdominal tenderness.   Musculoskeletal:         General: No swelling. Normal range of motion.      Cervical back: Normal range of motion.      Right lower leg: Edema present.      Left lower leg: Edema present.   Skin:     General: Skin is warm and dry.   Neurological:      Mental Status: He is alert and oriented to person, place, and time. Mental status is at baseline.      Motor: No weakness.   Psychiatric:         Mood and Affect: Mood normal.         Behavior: Behavior normal.         Thought Content: Thought content normal.            Laboratory results / Imaging reviewed: Hard copy/ies in medical chart:    There were no vitals filed for this visit.      Current Medications:  All medications reviewed and updated in Nursing Home Chart    Please note: This note was completed in part utilizing a voice-recognition software may have been used in the preparation of this document. Grammatical errors, random word insertion, spelling mistakes, and incomplete sentences may be an occasional consequence of the system secondary to software limitations, ambient noise and hardware issues. Occasional wrong word or \"sound-alike\" substitutions may have occurred due to the inherent limitations of voice recognition software. At the time of dictation, efforts were made to edit, clarify and/or correct errors. Interpretation should be guided by context. Please read the chart carefully and recognize, using context, where " substitutions have occurred. If you have any questions or concerns about the context, text or information contained within the body of this dictation, please contact myself, the provider, for further clarification.      VICKI Elizabeth  11/5/2024   English

## 2024-11-06 NOTE — ASSESSMENT & PLAN NOTE
Testing positive for covid on 08/22  Cough   Denies congestion  Afebrile  Denies SOB  Continue supportive care  Encourage fluids as tolerated  Continue to monitor given co-morbidities of CHF, diabetes

## 2024-11-06 NOTE — ASSESSMENT & PLAN NOTE
Baseline creatinine 1.17-1.54  Recent BMP on 10/23/24  Creatinine-1.71  BUN-40  Slightly above baseline  Recently discontinued Claritin   Continue to monitor periodically   Monitor I/O's  Encourage PO hydration  Monitor for hypertension  Avoid hypotension

## 2024-12-03 ENCOUNTER — NURSING HOME VISIT (OUTPATIENT)
Dept: GERIATRICS | Facility: OTHER | Age: 79
End: 2024-12-03
Payer: COMMERCIAL

## 2024-12-03 DIAGNOSIS — F51.01 PRIMARY INSOMNIA: ICD-10-CM

## 2024-12-03 DIAGNOSIS — M1A.39X0 CHRONIC GOUT DUE TO RENAL IMPAIRMENT OF MULTIPLE SITES WITHOUT TOPHUS: ICD-10-CM

## 2024-12-03 DIAGNOSIS — R26.2 AMBULATORY DYSFUNCTION: ICD-10-CM

## 2024-12-03 DIAGNOSIS — K59.09 CHRONIC CONSTIPATION: ICD-10-CM

## 2024-12-03 DIAGNOSIS — J41.0 SIMPLE CHRONIC BRONCHITIS (HCC): ICD-10-CM

## 2024-12-03 DIAGNOSIS — D63.8 ANEMIA, CHRONIC DISEASE: ICD-10-CM

## 2024-12-03 DIAGNOSIS — R21 RASH OF BACK: ICD-10-CM

## 2024-12-03 DIAGNOSIS — N18.32 TYPE 2 DIABETES MELLITUS WITH STAGE 3B CHRONIC KIDNEY DISEASE, WITH LONG-TERM CURRENT USE OF INSULIN (HCC): Primary | ICD-10-CM

## 2024-12-03 DIAGNOSIS — I25.10 CORONARY ARTERY DISEASE INVOLVING NATIVE CORONARY ARTERY OF NATIVE HEART WITHOUT ANGINA PECTORIS: ICD-10-CM

## 2024-12-03 DIAGNOSIS — I10 ESSENTIAL HYPERTENSION: ICD-10-CM

## 2024-12-03 DIAGNOSIS — I48.19 PERSISTENT ATRIAL FIBRILLATION (HCC): ICD-10-CM

## 2024-12-03 DIAGNOSIS — N18.32 STAGE 3B CHRONIC KIDNEY DISEASE (CKD) (HCC): ICD-10-CM

## 2024-12-03 DIAGNOSIS — Z79.4 TYPE 2 DIABETES MELLITUS WITH STAGE 3B CHRONIC KIDNEY DISEASE, WITH LONG-TERM CURRENT USE OF INSULIN (HCC): Primary | ICD-10-CM

## 2024-12-03 DIAGNOSIS — K21.9 GASTROESOPHAGEAL REFLUX DISEASE, UNSPECIFIED WHETHER ESOPHAGITIS PRESENT: ICD-10-CM

## 2024-12-03 DIAGNOSIS — E11.22 TYPE 2 DIABETES MELLITUS WITH STAGE 3B CHRONIC KIDNEY DISEASE, WITH LONG-TERM CURRENT USE OF INSULIN (HCC): Primary | ICD-10-CM

## 2024-12-03 DIAGNOSIS — I50.32 CHRONIC DIASTOLIC HEART FAILURE (HCC): ICD-10-CM

## 2024-12-03 DIAGNOSIS — Z85.46 HISTORY OF PROSTATE CANCER: ICD-10-CM

## 2024-12-03 PROBLEM — M25.532 LEFT WRIST PAIN: Status: RESOLVED | Noted: 2023-12-25 | Resolved: 2024-12-03

## 2024-12-03 PROBLEM — U07.1 COVID: Status: RESOLVED | Noted: 2023-10-13 | Resolved: 2024-12-03

## 2024-12-03 PROCEDURE — 99309 SBSQ NF CARE MODERATE MDM 30: CPT | Performed by: STUDENT IN AN ORGANIZED HEALTH CARE EDUCATION/TRAINING PROGRAM

## 2024-12-04 NOTE — ASSESSMENT & PLAN NOTE
Wt Readings from Last 3 Encounters:   11/05/24 102 kg (224 lb 11.2 oz)   07/15/24 104 kg (229 lb 6.4 oz)   05/03/24 98 kg (216 lb)       Monitor daily weight - fairly stable, no alarming uptrend  Monitor volume status clinically - seems stable, peripheral edema fairly mild and stable/overall improved, no orthopnea, no alarm symptoms of CHF exacerbation  Encourage use of knee high TEDS stockings, he is wearing them  Continue torsemide 40mg daily, continue to monitor and adjust dose as appropriate  Follow up with Cardiology

## 2024-12-04 NOTE — ASSESSMENT & PLAN NOTE
Per records, history of CAD resulting in an anterior wall myocardial infarction following which he underwent coronary artery bypass surgery in 1997  No acute cardiac complaints  Continue Xarelto, statin, carvedilol  Follow up with Cardiology

## 2024-12-04 NOTE — ASSESSMENT & PLAN NOTE
Stable, notes sleeping well lately and even better since diuretic was retimed to morning  Continue melatonin, will decrease from 5mg to 3mg qHS  Also on trazodone, has been gradually weaned to 50mg qHS as of Oct 2024 and seems stable  Consider further weaning if remaining stable  Encourage sleep hygiene  Continue to monitor

## 2024-12-04 NOTE — ASSESSMENT & PLAN NOTE
At risk due to relative immobility, comorbidities including diabetes, iron supplement  Monitor stool output - Patient reports generally BM every few days recently stable  Bowel regimen at facility as ordered; continue miralax, docusate, senna regimen; consider bisacodyl suppository PRN if no BM in 2-3 days  Encourage mobility as tolerated, PO hydration as appropriate, high fiber diet/prune juice (in outpatient setting as appropriate)  Goal is for 1 easy BM every 1-2 days

## 2024-12-04 NOTE — ASSESSMENT & PLAN NOTE
Following Cardiology outpatient  Seems to be fairly new/recent onset Afib as per Cardiology eval  Has been started on Xarelto 20mg daily for AC. Aspirin discontinued as per Cardiology recommendation. Monitor for bleeding concern.  Cardiology considering Watchman procedure in future  Continue low dose carvedilol for rate control. If bradycardia worsens/becomes symptomatic Cardiology would consider pacemaker.  Cardiology deferred starting on antiarrythmic drug at present  No acute cardiac complaints  Follow up with Cardiology as appropriate

## 2024-12-04 NOTE — PROGRESS NOTES
[facility]  Facility: [facility]    PROGRESS NOTE  Nursing Home Place of Service: nursing home place of service: POS 32 Unskilled- No Part A Coverage    NAME: Laureano Adair  : 1945 AGE: 79 y.o. SEX: male MRN: [id number]  DATE OF ENCOUNTER: 12/3/2024    Assessment and Plan     Problem List Items Addressed This Visit       Chronic diastolic heart failure (HCC)    Wt Readings from Last 3 Encounters:   24 102 kg (224 lb 11.2 oz)   07/15/24 104 kg (229 lb 6.4 oz)   24 98 kg (216 lb)       Monitor daily weight - fairly stable, no alarming uptrend  Monitor volume status clinically - seems stable, peripheral edema fairly mild and stable/overall improved, no orthopnea, no alarm symptoms of CHF exacerbation  Encourage use of knee high TEDS stockings, he is wearing them  Continue torsemide 40mg daily, continue to monitor and adjust dose as appropriate  Follow up with Cardiology         Chronic obstructive pulmonary disease (HCC)    Seems to be quite mild, patient denies any recent breathing concerns  Has not been requiring any breathing treatments/inhalers  Continue to monitor  10/2013 outpatient PFTs reportedly showed mild obstruction  Consider outpatient PFTs with pulmonary as appropriate         Essential hypertension    Monitor BP - generally controlled/stable around 110s-130s systolic  Pulse generally 60s-70s, sometimes borderline bradycardic high 50s  No acute cardiac complaints  Avoid hypotension  Continue regimen including carvedilol 1.56mg BID, terazosin 1mg, torsemide 40mg daily  Consider adjusting/weaning regimen as appropriate  Follow up with Cardiology         GERD (gastroesophageal reflux disease)    Stable per patient  -monitor off medications  -recommend OOB with meals, sit upright for at least 30 minutes afterwards, avoid trigger foods  -continue to monitor  -follow up with GI as needed         Type 2 diabetes mellitus (HCC) - Primary      Lab Results   Component  Value Date    HGBA1C 6.0 (H) 08/24/2023     A1c very well controlled for age, recently 6.2  Monitor glucose - fasting sugars generally low 100s, overall improved from prior  Avoid hypoglycemia  Continue insulin glargine 22u daily  Monitor A1c routinely  Encourage lifestyle modifications including healthy diet, weight management, exercise as appropriate  Follow up with Ophthalmology/Podiatry as appropriate           Stage 3b chronic kidney disease (CKD) (Former[facility])    Lab Results   Component Value Date    EGFR 56 (L) 11/28/2023    EGFR 48 (L) 11/07/2023    EGFR 49 (L) 10/24/2023    CREATININE 1.28 11/28/2023    CREATININE 1.47 (H) 11/07/2023    CREATININE 1.44 (H) 10/24/2023     Reported hx of RCC s/p nephrectomy 2006  eGFR (baseline seems 30s-40s)  Seems consistent with CKD3b likely in setting of diabetes  Monitor renal function on routine labs - stable  Avoid nephrotoxins, NSAIDs as able  Encourage PO hydration, respecting volume status         Ambulatory dysfunction    Mainly uses wheelchair, at times rollator at baseline  Denies recent falls  -PT/OT as appropriate  -fall precautions  -encourage appropriate DME use         Chronic constipation    At risk due to relative immobility, comorbidities including diabetes, iron supplement  Monitor stool output - Patient reports generally BM every few days recently stable  Bowel regimen at facility as ordered; continue miralax, docusate, senna regimen; consider bisacodyl suppository PRN if no BM in 2-3 days  Encourage mobility as tolerated, PO hydration as appropriate, high fiber diet/prune juice (in outpatient setting as appropriate)  Goal is for 1 easy BM every 1-2 days         Anemia, chronic disease    Likely multifactorial in setting of CKD and suspect iron deficiency  Continues on iron supplementation  -monitor on routine labs - stable  -monitor for acute bleed - no present signs  -consider further workup, Heme consult if persistent/worsening  -transfuse PRN Hb <7          Primary insomnia    Stable, notes sleeping well lately and even better since diuretic was retimed to morning  Continue melatonin, will decrease from 5mg to 3mg qHS  Also on trazodone, has been gradually weaned to 50mg qHS as of Oct 2024 and seems stable  Consider further weaning if remaining stable  Encourage sleep hygiene  Continue to monitor         Coronary artery disease involving native coronary artery of native heart without angina pectoris    Per records, history of CAD resulting in an anterior wall myocardial infarction following which he underwent coronary artery bypass surgery in 1997  No acute cardiac complaints  Continue Xarelto, statin, carvedilol  Follow up with Cardiology         Chronic gout due to renal impairment of multiple sites without tophus    Noted hx of gout  No present acute/associated complaints. Did appear to have a recent flare involving finger in Sept 2024 treated with colchicine, flare seems resolved  Continue allopurinol  Continue to monitor         Rash of back    Patient notes longstanding upper back itching, he is not certain of timeframe but thinks it is overall stable recently  On a prior exam had noted white lesions over back. May be consistent with tinea versicolor given he does sweat and this may have developed over summer months/heat  At present does not appear to have notable rash of back but small excoriations which appear consistent with his scratching; no open wound  Has been treated with topical miconazole and topical steroid trial previously  Overall the issue seems much improved from over the summer and recently stable per patient. Topical sarna lotion is very helpful he feels and he does not feel he needs anything else.  Keep area clean/dry  Continue to monitor. Consider further workup, Dermatology consult if issue persists/worsens               Persistent atrial fibrillation (HCC)    Following Cardiology outpatient  Seems to be fairly new/recent onset Afib as per  Cardiology rani  Has been started on Xarelto 20mg daily for AC. Aspirin discontinued as per Cardiology recommendation. Monitor for bleeding concern.  Cardiology considering Watchman procedure in future  Continue low dose carvedilol for rate control. If bradycardia worsens/becomes symptomatic Cardiology would consider pacemaker.  Cardiology deferred starting on antiarrythmic drug at present  No acute cardiac complaints  Follow up with Cardiology as appropriate         History of prostate cancer    Reported hx of BPH and hx prostate CA s/p XRT as per chart review  No apparent acute/associated symptoms at present  Reports urinating well independently  Maintained on tamsulosin  Monitor for acute urinary change  Follow up with Urology as needed.                      Chief Complaint     Follow up 30-60 day    History of Present Illness     Laureano Adair is a 79 y.o. male who was seen today for follow up. PMH including CHF, atherosclerotic heart disease, hypertension, HLD, BPH, DM type 2, CKD, vitamin D deficiency, and COPD       Patient seen and examined in room  Others present for encounter: none  Patient seated in chair (wheelchair) able to self-propel, watching a video on his laptop in room  Appears comfortable, awake, alert, oriented to situation, able to converse appropriately  Patient polite, in good spirits, Aox3, appears to be a reasonable historian, mildly [place]. Mentation seems stable compared to prior. He notes he feels well overall recently, feels happy and well cared for, with no notable acute concerns. Recalls details of his Cardio visit yesterday. We discussed he has chronic itching of upper back, he feel it is a chronic/long-term issue, stable recently, see plan. Peripheral edema he feels has been improved overall from prior and stable recently. Very happy with current timing of diuretic in the AM, notes he is not having to get up as much to urinate overnight and has been sleeping well through the night.  Breathing fine, no acute respiratory symptoms or orthopnea. No CP/palpitations or acute orthostasis. Appetite good, denies swallowing issues, no abd pain/N/V, endorses regular BM recently. No acute pain anywhere (has noted chronic bilateral knee pain with hx of bilat knee replacement) which is stable and he does not feel he needs anything more for it. No acute cardiopulmonary, abdominal, or urinary symptoms; see ROS for more details. Generally gets around using wheelchair or rollator and denies recent falls.     No further questions or acute concerns identified.  No acute concerns per nursing.       Lab Review:   12/18/23: CBC with Hb 9.5; CMP with GFR 51; uric acid 9.4; TSH WNL; A1c from Aug 2023 was 6.0  1/3/24: BMP with Cr 1.63 (baseline seems 1.5-1.8), eGFR 42 (baseline seems 30s-40s)  2/7/24: CBC with Hb 9.4 (stable, normocytic); BMP with Cr 1.78, eGFR 38; A1c 6.2  2/21/24: BMP with Cr 1.65, eGFR 41  6/10/24: BMP with Cr 1.62, eGFR 42; CBC with Hb 10.6  6/18/24: BMP with Cr 1.66, eGFR 41; CBC with Hb 10.3  9/23/24: CBC with Hb 10.4; BMP with Cr 1.54, eGFR 45; uric acid 8.9  9/30/24: BMP with Cr 1.58, eGFR 43  10/23/24: BMP with Cr 1.71, eGFR 40       Lab Results   Component Value Date    HGBA1C 6.0 (H) 08/24/2023     Lab Results   Component Value Date    TSH 2.69 10/14/2023     Lab Results   Component Value Date    BDTUXKTT23 450 07/12/2023     Lab Results   Component Value Date    IRON 47 10/24/2022    TIBC 276 10/24/2022    FERRITIN 58 01/23/2021           Echo Oct 2023: difficult study, EF not reported, systolic function overall preserved, indeterminate diastolic dysfunction    The following portions of the patient's history were reviewed and updated as appropriate: allergies, current medications, past family history, past medical history, past social history, past surgical history and problem list.    Review of Systems     Review of Systems   Constitutional:  Negative for appetite change, chills,  diaphoresis and fever.   HENT:  Positive for hearing loss (mild, chronic, stable). Negative for drooling, ear pain, rhinorrhea, sore throat and trouble swallowing.    Eyes:  Negative for pain, discharge, redness, itching and visual disturbance (chronic R eye central vision loss stable).   Respiratory:  Negative for cough, chest tightness, shortness of breath and wheezing.    Cardiovascular:  Positive for leg swelling (chronic, stable/improved). Negative for chest pain and palpitations.   Gastrointestinal:  Negative for abdominal pain, blood in stool, constipation, diarrhea, nausea and vomiting.   Genitourinary:  Negative for difficulty urinating, dysuria, flank pain and hematuria.   Musculoskeletal:  Positive for gait problem. Negative for arthralgias.   Skin:  Positive for rash. Negative for color change.   Neurological:  Negative for dizziness, tremors, seizures, facial asymmetry and headaches.   Psychiatric/Behavioral:  Negative for agitation, behavioral problems and confusion. The patient is not nervous/anxious and is not hyperactive.        Active Problem List     Patient Active Problem List   Diagnosis    Atrial flutter (HCC)    Chronic diastolic heart failure (HCC)    Chronic obstructive pulmonary disease (HCC)    Essential hypertension    GERD (gastroesophageal reflux disease)    Obstructive sleep apnea    Type 2 diabetes mellitus (HCC)    Chronic right-sided low back pain without sciatica    Hyponatremia    Morbid obesity (HCC)    Myocardial injury    Secondary hyperparathyroidism (HCC)    Stage 3a chronic kidney disease (HCC)    Thrombocytopenia (HCC)    Vision loss of right eye    Stage 3b chronic kidney disease (CKD) (HCC)    Ambulatory dysfunction    Chronic constipation    Advance care planning    Anemia, chronic disease    Primary insomnia    Coronary artery disease involving native coronary artery of native heart without angina pectoris    Acute gout of left hand    Chronic gout due to renal  impairment of multiple sites without tophus    Rash of back    Persistent atrial fibrillation (HCC)    History of prostate cancer       Objective     Vital Signs:     BP: 144/70 mmHg  11/27/2024 08:38 Temp:97.8 °F  10/5/2024 14:06   Pulse:58 bpm  11/27/2024 08:38 Weight:224.1 Lbs  12/3/2024 14:09   Resp:20 Breaths/min  10/3/2024 07:43 BS:110 mg/dL  12/3/2024 07:34 O2:95 %  10/3/2024 07:43 Pain:0  12/3/2024 16:57       Physical Exam  Vitals reviewed.   Constitutional:       General: He is not in acute distress.     Appearance: He is obese. He is not toxic-appearing or diaphoretic.   HENT:      Head: Normocephalic and atraumatic.      Right Ear: External ear normal.      Left Ear: External ear normal.      Nose: Nose normal. No rhinorrhea.      Mouth/Throat:      Mouth: Mucous membranes are dry.      Pharynx: Oropharynx is clear. No oropharyngeal exudate or posterior oropharyngeal erythema.   Eyes:      General: No scleral icterus.        Right eye: No discharge.         Left eye: No discharge.      Extraocular Movements: Extraocular movements intact.      Conjunctiva/sclera: Conjunctivae normal.      Pupils: Pupils are equal, round, and reactive to light.   Cardiovascular:      Rate and Rhythm: Normal rate and regular rhythm.   Pulmonary:      Effort: Pulmonary effort is normal. No respiratory distress.      Breath sounds: No wheezing or rales.   Abdominal:      General: Bowel sounds are normal.      Palpations: Abdomen is soft.      Tenderness: There is no abdominal tenderness. There is no guarding.   Musculoskeletal:         General: Swelling present. No tenderness.      Cervical back: No rigidity.      Comments: 1+ bilateral lower extremity edema (appears stable/improved overall from prior), no noted open wound/active drainage/erythema   Skin:     General: Skin is warm and dry.      Coloration: Skin is not jaundiced.      Comments: Upper back with no notable rash at present, some mild excoriations which appear  consistent with scratching which patient has been doing at times   Neurological:      General: No focal deficit present.      Mental Status: He is alert and oriented to person, place, and time. Mental status is at baseline.   Psychiatric:         Mood and Affect: Mood normal.         Behavior: Behavior normal.         Thought Content: Thought content normal.         Judgment: Judgment normal.         Pertinent Laboratory/Diagnostic Studies:  Laboratory and Imaging studies reviewed. Full report in the paper chart.     Current Medications   Medications reviewed and updated in facility chart.      -Total time spent on this encounter today including documentation and workup review, face to face time, history and exam, and documentation/orders was approximately 40 minutes.  -This note will be copied to [facility] EMR where vitals and medication orders are placed.    Raúl Clemente D.O.  Geriatric Medicine  12/3/2024 8:48 PM

## 2024-12-04 NOTE — ASSESSMENT & PLAN NOTE
Lab Results   Component Value Date    EGFR 56 (L) 11/28/2023    EGFR 48 (L) 11/07/2023    EGFR 49 (L) 10/24/2023    CREATININE 1.28 11/28/2023    CREATININE 1.47 (H) 11/07/2023    CREATININE 1.44 (H) 10/24/2023     Reported hx of RCC s/p nephrectomy 2006  eGFR (baseline seems 30s-40s)  Seems consistent with CKD3b likely in setting of diabetes  Monitor renal function on routine labs - stable  Avoid nephrotoxins, NSAIDs as able  Encourage PO hydration, respecting volume status

## 2024-12-04 NOTE — ASSESSMENT & PLAN NOTE
Patient notes longstanding upper back itching, he is not certain of timeframe but thinks it is overall stable recently  On a prior exam had noted white lesions over back. May be consistent with tinea versicolor given he does sweat and this may have developed over summer months/heat  At present does not appear to have notable rash of back but small excoriations which appear consistent with his scratching; no open wound  Has been treated with topical miconazole and topical steroid trial previously  Overall the issue seems much improved from over the summer and recently stable per patient. Topical sarna lotion is very helpful he feels and he does not feel he needs anything else.  Keep area clean/dry  Continue to monitor. Consider further workup, Dermatology consult if issue persists/worsens

## 2024-12-04 NOTE — ASSESSMENT & PLAN NOTE
Reported hx of BPH and hx prostate CA s/p XRT as per chart review  No apparent acute/associated symptoms at present  Reports urinating well independently  Maintained on tamsulosin  Monitor for acute urinary change  Follow up with Urology as needed.

## 2024-12-04 NOTE — ASSESSMENT & PLAN NOTE
Monitor BP - generally controlled/stable around 110s-130s systolic  Pulse generally 60s-70s, sometimes borderline bradycardic high 50s  No acute cardiac complaints  Avoid hypotension  Continue regimen including carvedilol 1.56mg BID, terazosin 1mg, torsemide 40mg daily  Consider adjusting/weaning regimen as appropriate  Follow up with Cardiology

## 2024-12-04 NOTE — ASSESSMENT & PLAN NOTE
Lab Results   Component Value Date    HGBA1C 6.0 (H) 08/24/2023     A1c very well controlled for age, recently 6.2  Monitor glucose - fasting sugars generally low 100s, overall improved from prior  Avoid hypoglycemia  Continue insulin glargine 22u daily  Monitor A1c routinely  Encourage lifestyle modifications including healthy diet, weight management, exercise as appropriate  Follow up with Ophthalmology/Podiatry as appropriate

## 2024-12-17 ENCOUNTER — NURSING HOME VISIT (OUTPATIENT)
Dept: GERIATRICS | Facility: OTHER | Age: 79
End: 2024-12-17
Payer: COMMERCIAL

## 2024-12-17 DIAGNOSIS — I50.32 CHRONIC DIASTOLIC HEART FAILURE (HCC): ICD-10-CM

## 2024-12-17 DIAGNOSIS — D68.9 ANTICOAGULANT-INDUCED BLEEDING (HCC): Primary | ICD-10-CM

## 2024-12-17 DIAGNOSIS — T45.7X1A ANTICOAGULANT-INDUCED BLEEDING (HCC): Primary | ICD-10-CM

## 2024-12-17 DIAGNOSIS — I10 ESSENTIAL HYPERTENSION: ICD-10-CM

## 2024-12-17 DIAGNOSIS — I48.19 PERSISTENT ATRIAL FIBRILLATION (HCC): ICD-10-CM

## 2024-12-17 PROCEDURE — 99308 SBSQ NF CARE LOW MDM 20: CPT | Performed by: STUDENT IN AN ORGANIZED HEALTH CARE EDUCATION/TRAINING PROGRAM

## 2024-12-17 NOTE — ASSESSMENT & PLAN NOTE
Monitor BP - generally controlled/stable around 110s-130s systolic  Pulse generally 60s-70s, sometimes borderline bradycardic high 50s, stable/asymptomatic  No acute cardiac complaints  Avoid hypotension  Continue regimen including carvedilol 1.56mg BID, terazosin 1mg, torsemide 40mg daily  Consider adjusting/weaning regimen as appropriate  Follow up with Cardiology

## 2024-12-17 NOTE — ASSESSMENT & PLAN NOTE
Following Cardiology outpatient  Seems to be fairly new/recent onset Afib as per Cardiology rani  Has been started on Xarelto 20mg daily recently for AC. Aspirin was discontinued as per Cardiology recommendation. Monitor for bleeding concern.  Extensive discussion with patient 12/17 as he is unhappy with Xarelto due to minor cuts/scrapes taking a lot longer to stop bleeding. He initially wished to stop Xarelto completely. We discussed extensively regarding potential risks of stopping including risk of clot in heart, stroke, death. Patient verbalized understanding. I offered a lower dose of Xarelto as, in any case, given his renal function it would be reasonable to have him on a 15mg daily dose instead. Patient was very clear with me that due to his focus on quality of life he would not wish for more than a 10mg daily dose with extensive shared decision-making today, and again verbalizing understanding of the risks of potentially under-dosing his Xarelto, stating that he has made it this far without even having been on Xarelto at all and he is willing to accept the potential risk. Therefore, adjusted his Xarelto dose to 10mg daily at this time and he will try this and see if he is happier with it. If not, discussed that we could discontinue Xarelto entirely and potentially resume him on his prior aspirin alone. Patient appreciative and amenable to plan.  Cardiology considering Watchman procedure in future  Continue low dose carvedilol for rate control. If bradycardia worsens/becomes symptomatic Cardiology would consider pacemaker.  Cardiology deferred starting on antiarrythmic drug at present  No acute cardiac complaints  Follow up with Cardiology as appropriate

## 2024-12-17 NOTE — ASSESSMENT & PLAN NOTE
Several instances of minor/self-limited bleeding since being on Xarelto. Not seeming to be dangerous and seem to be generally self-limited but very worrisome to patient and affecting his quality of life  See plan under Afib

## 2024-12-17 NOTE — PROGRESS NOTES
[facility]  Facility: [facility]    PROGRESS NOTE  Nursing Home Place of Service: nursing home place of service: POS 32 Unskilled- No Part A Coverage    NAME: Laureano Adair  : 1945 AGE: 79 y.o. SEX: male MRN: [id number]  DATE OF ENCOUNTER: 2024    Assessment and Plan     Problem List Items Addressed This Visit       Chronic diastolic heart failure (HCC)    Wt Readings from Last 3 Encounters:   24 102 kg (224 lb 11.2 oz)   07/15/24 104 kg (229 lb 6.4 oz)   24 98 kg (216 lb)       Monitor daily weight - fairly stable, no alarming uptrend  Monitor volume status clinically - seems stable, peripheral edema fairly mild and stable/overall improved, no orthopnea, no alarm symptoms of CHF exacerbation  Encourage use of knee high TEDS stockings, he is wearing them  Continue torsemide 40mg daily, continue to monitor and adjust dose as appropriate  Follow up with Cardiology         Essential hypertension    Monitor BP - generally controlled/stable around 110s-130s systolic  Pulse generally 60s-70s, sometimes borderline bradycardic high 50s, stable/asymptomatic  No acute cardiac complaints  Avoid hypotension  Continue regimen including carvedilol 1.56mg BID, terazosin 1mg, torsemide 40mg daily  Consider adjusting/weaning regimen as appropriate  Follow up with Cardiology         Persistent atrial fibrillation (HCC)    Following Cardiology outpatient  Seems to be fairly new/recent onset Afib as per Cardiology eval  Has been started on Xarelto 20mg daily recently for AC. Aspirin was discontinued as per Cardiology recommendation. Monitor for bleeding concern.  Extensive discussion with patient  as he is unhappy with Xarelto due to minor cuts/scrapes taking a lot longer to stop bleeding. He initially wished to stop Xarelto completely. We discussed extensively regarding potential risks of stopping including risk of clot in heart, stroke, death. Patient verbalized understanding. I  offered a lower dose of Xarelto as, in any case, given his renal function it would be reasonable to have him on a 15mg daily dose instead. Patient was very clear with me that due to his focus on quality of life he would not wish for more than a 10mg daily dose with extensive shared decision-making today, and again verbalizing understanding of the risks of potentially under-dosing his Xarelto, stating that he has made it this far without even having been on Xarelto at all and he is willing to accept the potential risk. Therefore, adjusted his Xarelto dose to 10mg daily at this time and he will try this and see if he is happier with it. If not, discussed that we could discontinue Xarelto entirely and potentially resume him on his prior aspirin alone. Patient appreciative and amenable to plan.  Cardiology considering Watchman procedure in future  Continue low dose carvedilol for rate control. If bradycardia worsens/becomes symptomatic Cardiology would consider pacemaker.  Cardiology deferred starting on antiarrythmic drug at present  No acute cardiac complaints  Follow up with Cardiology as appropriate         Anticoagulant-induced bleeding (HCC) - Primary    Several instances of minor/self-limited bleeding since being on Xarelto. Not seeming to be dangerous and seem to be generally self-limited but very worrisome to patient and affecting his quality of life  See plan under Afib                        Chief Complaint     Follow up acute regarding bleeding/xarelto concerns, asked by nursing to see    History of Present Illness     Laureano Adair is a 79 y.o. male who was seen today for follow up. PMH including CHF, atherosclerotic heart disease, hypertension, HLD, BPH, DM type 2, CKD, vitamin D deficiency, and COPD       Patient seen and examined in room  Others present for encounter: none  Patient seated in chair (wheelchair) able to self-propel  Appears comfortable, awake, alert, oriented to situation, able to  converse appropriately  Patient polite, in good spirits, Aox3, appears to be a reasonable historian, mildly [place]. Mentation seems stable compared to prior. He notes he feels well overall recently. He states he is not happy with taking his recently-started Xarelto because he has noted a lot of minor bleeding issues with it. He has noted minor scratches on his legs bleed a lot, and when he nicks himself shaving the bleeding lasts a long time. At present no active bleed and he has not noted any blood in urine or stool. See Plan. Remainder of ROS briefly reviewed/discussed and stable. Peripheral edema he thinks has been improved overall from prior and mild/stable recently. He is happy with current timing of diuretic in the AM, since he is not having to get up as much to urinate overnight and has been sleeping well through the night. Breathing fine on room air, no acute respiratory symptoms or orthopnea. No CP/palpitations or acute orthostasis. Appetite good, no swallowing issues, no abd pain/N/V, endorses regular BM recently. No acute pain anywhere (has noted chronic bilateral knee pain with hx of bilat knee replacement which is stable). No acute cardiopulmonary, abdominal, or urinary symptoms; see ROS for more details. Generally gets around using wheelchair or rollator and denies recent falls.     No further questions or acute concerns identified.  No further acute concerns per nursing.    We also discussed patient is having evaluation soon for possibly being discharged from LTC facility as he may be moving in with his wife. Discussions continuing.       Lab Review:   12/18/23: CBC with Hb 9.5; CMP with GFR 51; uric acid 9.4; TSH WNL; A1c from Aug 2023 was 6.0  1/3/24: BMP with Cr 1.63 (baseline seems 1.5-1.8), eGFR 42 (baseline seems 30s-40s)  2/7/24: CBC with Hb 9.4 (stable, normocytic); BMP with Cr 1.78, eGFR 38; A1c 6.2  2/21/24: BMP with Cr 1.65, eGFR 41  6/10/24: BMP with Cr 1.62, eGFR 42; CBC with Hb  10.6  6/18/24: BMP with Cr 1.66, eGFR 41; CBC with Hb 10.3  9/23/24: CBC with Hb 10.4; BMP with Cr 1.54, eGFR 45; uric acid 8.9  9/30/24: BMP with Cr 1.58, eGFR 43  10/23/24: BMP with Cr 1.71, eGFR 40  12/9/24: BMP with Cr 1.51, eGFR 46; CBC with Hb 10.8 (borderline macrocytic)       Lab Results   Component Value Date    HGBA1C 6.0 (H) 08/24/2023     Lab Results   Component Value Date    TSH 2.69 10/14/2023     Lab Results   Component Value Date    LFTBDOUI60 450 07/12/2023     Lab Results   Component Value Date    IRON 47 10/24/2022    TIBC 276 10/24/2022    FERRITIN 58 01/23/2021           Echo Oct 2023: difficult study, EF not reported, systolic function overall preserved, indeterminate diastolic dysfunction    The following portions of the patient's history were reviewed and updated as appropriate: allergies, current medications, past family history, past medical history, past social history, past surgical history and problem list.    Review of Systems     Review of Systems   Constitutional:  Negative for appetite change, chills, diaphoresis and fever.   HENT:  Positive for hearing loss (mild, chronic, stable). Negative for drooling, ear pain, rhinorrhea, sore throat and trouble swallowing.    Eyes:  Negative for pain, discharge, redness, itching and visual disturbance (chronic R eye central vision loss stable).   Respiratory:  Negative for cough, chest tightness, shortness of breath and wheezing.    Cardiovascular:  Positive for leg swelling (chronic, stable/improved). Negative for chest pain and palpitations.   Gastrointestinal:  Negative for abdominal pain, blood in stool, constipation, diarrhea, nausea and vomiting.   Genitourinary:  Negative for difficulty urinating, dysuria, flank pain and hematuria.   Musculoskeletal:  Positive for gait problem. Negative for arthralgias.   Skin:  Negative for color change.   Neurological:  Negative for dizziness, tremors, seizures, facial asymmetry, weakness and  headaches.   Hematological:  Bruises/bleeds easily.   Psychiatric/Behavioral:  Negative for agitation, behavioral problems and confusion. The patient is not nervous/anxious and is not hyperactive.        Active Problem List     Patient Active Problem List   Diagnosis    Atrial flutter (HCC)    Chronic diastolic heart failure (HCC)    Chronic obstructive pulmonary disease (HCC)    Essential hypertension    GERD (gastroesophageal reflux disease)    Obstructive sleep apnea    Type 2 diabetes mellitus (HCC)    Chronic right-sided low back pain without sciatica    Hyponatremia    Morbid obesity (HCC)    Myocardial injury    Secondary hyperparathyroidism (HCC)    Stage 3a chronic kidney disease (HCC)    Thrombocytopenia (HCC)    Vision loss of right eye    Stage 3b chronic kidney disease (CKD) (HCC)    Ambulatory dysfunction    Chronic constipation    Advance care planning    Anemia, chronic disease    Primary insomnia    Coronary artery disease involving native coronary artery of native heart without angina pectoris    Acute gout of left hand    Chronic gout due to renal impairment of multiple sites without tophus    Rash of back    Persistent atrial fibrillation (HCC)    History of prostate cancer    Anticoagulant-induced bleeding (HCC)       Objective     Vital Signs:     BP: 125/63 mmHg  12/11/2024 08:38 Temp:97.8 °F  10/5/2024 14:06 Pulse:62 bpm  12/11/2024 08:00 Weight:223.3 Lbs  12/17/2024 13:32   Resp:20 Breaths/min  10/3/2024 07:43 BS:120 mg/dL  12/17/2024 07:50 O2:95 %  10/3/2024 07:43 Pain:0  12/17/2024 16:18       Physical Exam  Vitals reviewed.   Constitutional:       General: He is not in acute distress.     Appearance: He is obese. He is not toxic-appearing or diaphoretic.   HENT:      Head: Normocephalic and atraumatic.      Right Ear: External ear normal.      Left Ear: External ear normal.      Nose: Nose normal. No rhinorrhea.      Mouth/Throat:      Mouth: Mucous membranes are dry.      Pharynx:  Oropharynx is clear. No oropharyngeal exudate or posterior oropharyngeal erythema.   Eyes:      General: No scleral icterus.        Right eye: No discharge.         Left eye: No discharge.      Extraocular Movements: Extraocular movements intact.      Conjunctiva/sclera: Conjunctivae normal.      Pupils: Pupils are equal, round, and reactive to light.   Cardiovascular:      Rate and Rhythm: Normal rate and regular rhythm.   Pulmonary:      Effort: Pulmonary effort is normal. No respiratory distress.      Breath sounds: No wheezing or rales.   Abdominal:      General: Bowel sounds are normal.      Palpations: Abdomen is soft.      Tenderness: There is no abdominal tenderness. There is no guarding.   Musculoskeletal:         General: Swelling present. No tenderness.      Cervical back: No rigidity.      Comments: Trace bilateral lower extremity edema (appears stable/improved overall from prior), no noted open wound/active drainage/erythema however with some excoriations/scratches present; compression stockings in place  No calf tenderness   Skin:     General: Skin is warm and dry.      Coloration: Skin is not jaundiced.      Comments: Upper back with no notable rash at present, some mild excoriations which appear consistent with scratching which patient has been doing at times   Neurological:      General: No focal deficit present.      Mental Status: He is alert and oriented to person, place, and time. Mental status is at baseline.   Psychiatric:         Mood and Affect: Mood normal.         Behavior: Behavior normal.         Thought Content: Thought content normal.         Judgment: Judgment normal.         Pertinent Laboratory/Diagnostic Studies:  Laboratory and Imaging studies reviewed. Full report in the paper chart.     Current Medications   Medications reviewed and updated in facility chart.      -Total time spent on this encounter today including documentation and workup review, face to face time, history and  exam, and documentation/orders was approximately 25 minutes.  -This note will be copied to [facility] EMR where vitals and medication orders are placed.    Raúl Clemente D.O.  Geriatric Medicine  12/17/2024 6:01 PM

## 2025-01-02 ENCOUNTER — NURSING HOME VISIT (OUTPATIENT)
Dept: GERIATRICS | Facility: OTHER | Age: 80
End: 2025-01-02
Payer: COMMERCIAL

## 2025-01-02 DIAGNOSIS — Z85.46 HISTORY OF PROSTATE CANCER: ICD-10-CM

## 2025-01-02 DIAGNOSIS — I48.19 PERSISTENT ATRIAL FIBRILLATION (HCC): ICD-10-CM

## 2025-01-02 DIAGNOSIS — I10 ESSENTIAL HYPERTENSION: ICD-10-CM

## 2025-01-02 DIAGNOSIS — K59.09 CHRONIC CONSTIPATION: ICD-10-CM

## 2025-01-02 DIAGNOSIS — J41.0 SIMPLE CHRONIC BRONCHITIS (HCC): ICD-10-CM

## 2025-01-02 DIAGNOSIS — D68.9 ANTICOAGULANT-INDUCED BLEEDING (HCC): ICD-10-CM

## 2025-01-02 DIAGNOSIS — N18.32 STAGE 3B CHRONIC KIDNEY DISEASE (CKD) (HCC): ICD-10-CM

## 2025-01-02 DIAGNOSIS — H54.61 VISION LOSS OF RIGHT EYE: ICD-10-CM

## 2025-01-02 DIAGNOSIS — Z79.4 TYPE 2 DIABETES MELLITUS WITH STAGE 3B CHRONIC KIDNEY DISEASE, WITH LONG-TERM CURRENT USE OF INSULIN (HCC): ICD-10-CM

## 2025-01-02 DIAGNOSIS — D63.8 ANEMIA, CHRONIC DISEASE: ICD-10-CM

## 2025-01-02 DIAGNOSIS — M1A.39X0 CHRONIC GOUT DUE TO RENAL IMPAIRMENT OF MULTIPLE SITES WITHOUT TOPHUS: ICD-10-CM

## 2025-01-02 DIAGNOSIS — F51.01 PRIMARY INSOMNIA: ICD-10-CM

## 2025-01-02 DIAGNOSIS — K21.9 GASTROESOPHAGEAL REFLUX DISEASE, UNSPECIFIED WHETHER ESOPHAGITIS PRESENT: ICD-10-CM

## 2025-01-02 DIAGNOSIS — I50.32 CHRONIC DIASTOLIC HEART FAILURE (HCC): Primary | ICD-10-CM

## 2025-01-02 DIAGNOSIS — N18.32 TYPE 2 DIABETES MELLITUS WITH STAGE 3B CHRONIC KIDNEY DISEASE, WITH LONG-TERM CURRENT USE OF INSULIN (HCC): ICD-10-CM

## 2025-01-02 DIAGNOSIS — R26.2 AMBULATORY DYSFUNCTION: ICD-10-CM

## 2025-01-02 DIAGNOSIS — I25.10 CORONARY ARTERY DISEASE INVOLVING NATIVE CORONARY ARTERY OF NATIVE HEART WITHOUT ANGINA PECTORIS: ICD-10-CM

## 2025-01-02 DIAGNOSIS — E11.22 TYPE 2 DIABETES MELLITUS WITH STAGE 3B CHRONIC KIDNEY DISEASE, WITH LONG-TERM CURRENT USE OF INSULIN (HCC): ICD-10-CM

## 2025-01-02 DIAGNOSIS — T45.7X1A ANTICOAGULANT-INDUCED BLEEDING (HCC): ICD-10-CM

## 2025-01-02 PROCEDURE — 99310 SBSQ NF CARE HIGH MDM 45: CPT | Performed by: STUDENT IN AN ORGANIZED HEALTH CARE EDUCATION/TRAINING PROGRAM

## 2025-01-03 NOTE — ASSESSMENT & PLAN NOTE
Patient has noted chronic stable central vision loss with intact peripheral vision in R eye  No acute change or associated pain  Interested in seeing Ophthalmology for what is potentially macular degeneration  Will order updated Ophtho referral to have patient scheduled for follow-up  Continue to monitor for acute change

## 2025-01-03 NOTE — PROGRESS NOTES
[facility]  Facility: [facility]    PROGRESS NOTE  Nursing Home Place of Service: nursing home place of service: POS 32 Unskilled- No Part A Coverage    NAME: Laureano Adair  : 1945 AGE: 79 y.o. SEX: male MRN: [id number]  DATE OF ENCOUNTER: 2025    Assessment and Plan     Problem List Items Addressed This Visit       Chronic diastolic heart failure (HCC) - Primary    Wt Readings from Last 3 Encounters:   24 102 kg (224 lb 11.2 oz)   07/15/24 104 kg (229 lb 6.4 oz)   24 98 kg (216 lb)       Monitor daily weight - fairly stable, no alarming uptrend  Monitor volume status clinically - seems stable, peripheral edema fairly mild and stable, no orthopnea, no alarm symptoms of CHF exacerbation  Encourage use of compression stockings, he is wearing them  Continue torsemide 40mg daily, continue to monitor and adjust dose as appropriate  Follow up with Cardiology         Chronic obstructive pulmonary disease (HCC)    Seems to be quite mild, patient denies any recent breathing concerns  Has not been requiring any breathing treatments/inhalers  Continue to monitor  10/2013 outpatient PFTs reportedly showed mild obstruction  Consider outpatient PFTs with pulmonary as appropriate         Essential hypertension    Monitor BP - generally controlled/stable around 110s-130s systolic  Pulse generally 60s-70s, sometimes borderline bradycardic in 50s, stable/asymptomatic  No acute cardiac complaints  Avoid hypotension  Continue regimen including carvedilol 1.56mg BID, terazosin 1mg, torsemide 40mg daily  Consider adjusting/weaning regimen as appropriate  Follow up with Cardiology         GERD (gastroesophageal reflux disease)    Stable per patient  -monitor off medications  -recommend OOB with meals, sit upright for at least 30 minutes afterwards, avoid trigger foods  -continue to monitor  -follow up with GI as needed         Type 2 diabetes mellitus (HCC)      Lab Results   Component Value  Date    HGBA1C 6.0 (H) 08/24/2023     A1c very well controlled for age, recently 6.2  Monitor glucose - fasting sugars generally well controlled in low 100s, overall improved from prior  Avoid hypoglycemia  Continue insulin glargine 22u daily  Monitor A1c routinely  Encourage lifestyle modifications including healthy diet, weight management, exercise as appropriate  Follow up with Ophthalmology/Podiatry as appropriate           Vision loss of right eye    Patient has noted chronic stable central vision loss with intact peripheral vision in R eye  No acute change or associated pain  Interested in seeing Ophthalmology for what is potentially macular degeneration  Will order updated Ophtho referral to have patient scheduled for follow-up  Continue to monitor for acute change         Stage 3b chronic kidney disease (CKD) ([facility])    Lab Results   Component Value Date    EGFR 56 (L) 11/28/2023    EGFR 48 (L) 11/07/2023    EGFR 49 (L) 10/24/2023    CREATININE 1.28 11/28/2023    CREATININE 1.47 (H) 11/07/2023    CREATININE 1.44 (H) 10/24/2023     Reported hx of RCC s/p nephrectomy 2006  eGFR (baseline seems 30s-40s)  Seems consistent with CKD3b likely in setting of diabetes  Monitor renal function on routine labs - stable  Avoid nephrotoxins, NSAIDs as able  Encourage PO hydration, respecting volume status         Ambulatory dysfunction    Mainly uses wheelchair, at times rollator at baseline  Denies recent falls  -PT/OT as appropriate  -fall precautions  -encourage appropriate DME use         Chronic constipation    At risk due to relative immobility, comorbidities including diabetes, iron supplement  Monitor stool output - Patient reports generally BM every few days recently stable  Bowel regimen at facility as ordered; continue miralax, docusate, senna regimen; consider bisacodyl suppository PRN if no BM in 2-3 days  Encourage mobility as tolerated, PO hydration as appropriate, high fiber diet/prune juice (in outpatient  setting as appropriate)  Goal is for 1 easy BM every 1-2 days         Anemia, chronic disease    Likely multifactorial in setting of CKD and suspect iron deficiency  Continues on iron supplementation  -monitor on routine labs - stable  -monitor for acute bleed - no present signs  -consider further workup, Heme consult if persistent/worsening  -transfuse PRN Hb <7         Primary insomnia    Stable, notes sleeping well lately and even better since diuretic was retimed to morning  Continue melatonin, stable since recent decrease to 3mg qHS  Also on trazodone, has been gradually weaned to 50mg qHS as of Oct 2024 and seems stable, will wean further to 25mg qHS  Consider further weaning if remaining stable  Encourage sleep hygiene  Continue to monitor         Coronary artery disease involving native coronary artery of native heart without angina pectoris    Per records, history of CAD resulting in an anterior wall myocardial infarction following which he underwent coronary artery bypass surgery in 1997  No acute cardiac complaints  Continue Xarelto, statin, carvedilol  Follow up with Cardiology         Chronic gout due to renal impairment of multiple sites without tophus    Noted hx of gout  No present acute/associated complaints. Did appear to have a recent flare involving finger in Sept 2024 treated with colchicine, flare seems resolved  Continue allopurinol  Continue to monitor         Persistent atrial fibrillation (HCC)    Following Cardiology outpatient  Seems to be fairly recent onset Afib as per Cardiology eval  Had been started on Xarelto recently. Aspirin was discontinued as per Cardiology recommendation. Monitor for bleeding concern.  Patient was unhappy with Xarelto at original 20mg daily dose due to noticing much more minor bleeding from cuts/scrapes/shaving affecting quality of life; with shared decision-making he was agreeable to no more than 10mg daily dose of Xarelto which he is presently on since Dec  2024 (aware of risks of underdosing).  Cardiology considering Watchman procedure in future  Continue low dose carvedilol for rate control. If bradycardia worsens/becomes symptomatic Cardiology would consider pacemaker.  Cardiology deferred starting on antiarrythmic drug at present  No acute cardiac complaints  Follow up with Cardiology as appropriate/scheduled         History of prostate cancer    Reported hx of BPH and hx prostate CA s/p XRT as per chart review  No apparent acute/associated symptoms at present  Reports urinating well independently  Maintained on tamsulosin  Monitor for acute urinary change  Last PSA 0.14 from 2020. Will add to next labs to recheck.  Follow up with Urology as needed.         Anticoagulant-induced bleeding (HCC)    Several instances of minor/self-limited bleeding since being on Xarelto. Not seeming to be dangerous and seem to be generally self-limited but very worrisome to patient and affecting his quality of life  Seems much improved/no longer a concern for patient since Xarelto dose reduction  See plan under Afib                          Chief Complaint     Follow up 30 day    History of Present Illness     Laureano Adair is a 79 y.o. male who was seen today for follow up. PMH including CHF, atherosclerotic heart disease, hypertension, HLD, BPH, DM type 2, CKD, vitamin D deficiency, and COPD       Patient seen and examined in common room  Others present for encounter: none  Patient seated in chair (wheelchair) able to self-propel, playing solitaire on phone  Appears comfortable, awake, alert, oriented to situation, able to converse appropriately  Patient polite, in good spirits, Aox3, appears to be a reasonable historian, mildly [place]. Mentation seems stable compared to prior. He notes he feels well overall recently with no acute concerns presently. Peripheral edema he thinks has been improved overall from prior and remaining mild/stable recently. He is happy with current timing of  diuretic in the AM, since he is not having to get up as much to urinate overnight and has been sleeping well through the night. Breathing fine on room air, no acute respiratory symptoms or orthopnea. No CP/palpitations or acute orthostasis. Appetite good, no swallowing issues, no abd pain/N/V, endorses regular BM recently. No acute pain anywhere (has noted chronic bilateral knee pain with hx of bilat knee replacement which is stable). No acute cardiopulmonary, abdominal, or urinary symptoms; see ROS for more details. Generally gets around using wheelchair or rollator and denies recent falls. He notes he previously had a plan to move in with his wife but presently that is on hold/will not work out he thinks, and is content to remain at facility.    No further questions or acute concerns identified.  No further acute concerns per nursing.         Lab Review:   12/18/23: CBC with Hb 9.5; CMP with GFR 51; uric acid 9.4; TSH WNL; A1c from Aug 2023 was 6.0  1/3/24: BMP with Cr 1.63 (baseline seems 1.5-1.8), eGFR 42 (baseline seems 30s-40s)  2/7/24: CBC with Hb 9.4 (stable, normocytic); BMP with Cr 1.78, eGFR 38; A1c 6.2  2/21/24: BMP with Cr 1.65, eGFR 41  6/10/24: BMP with Cr 1.62, eGFR 42; CBC with Hb 10.6  6/18/24: BMP with Cr 1.66, eGFR 41; CBC with Hb 10.3  9/23/24: CBC with Hb 10.4; BMP with Cr 1.54, eGFR 45; uric acid 8.9  9/30/24: BMP with Cr 1.58, eGFR 43  10/23/24: BMP with Cr 1.71, eGFR 40  12/9/24: BMP with Cr 1.51, eGFR 46; CBC with Hb 10.8 (borderline macrocytic)       Lab Results   Component Value Date    HGBA1C 6.0 (H) 08/24/2023     Lab Results   Component Value Date    TSH 2.69 10/14/2023     Lab Results   Component Value Date    NUMBQGUV34 450 07/12/2023     Lab Results   Component Value Date    IRON 47 10/24/2022    TIBC 276 10/24/2022    FERRITIN 58 01/23/2021           Echo Oct 2023: difficult study, EF not reported, systolic function overall preserved, indeterminate diastolic dysfunction    The  following portions of the patient's history were reviewed and updated as appropriate: allergies, current medications, past family history, past medical history, past social history, past surgical history and problem list.    Review of Systems     Review of Systems   Constitutional:  Negative for appetite change, chills, diaphoresis and fever.   HENT:  Positive for hearing loss (mild, chronic, stable). Negative for drooling, ear pain, rhinorrhea, sore throat and trouble swallowing.    Eyes:  Negative for pain, discharge, redness, itching and visual disturbance (chronic R eye central vision loss stable).   Respiratory:  Negative for cough, chest tightness, shortness of breath and wheezing.    Cardiovascular:  Positive for leg swelling (chronic, stable/improved). Negative for chest pain and palpitations.   Gastrointestinal:  Negative for abdominal pain, blood in stool, constipation, diarrhea, nausea and vomiting.   Genitourinary:  Negative for difficulty urinating, dysuria, flank pain and hematuria.   Musculoskeletal:  Positive for gait problem. Negative for arthralgias.   Skin:  Negative for color change.   Neurological:  Negative for dizziness, tremors, seizures, facial asymmetry, weakness and headaches.   Hematological:  Bruises/bleeds easily.   Psychiatric/Behavioral:  Negative for agitation, behavioral problems and confusion. The patient is not nervous/anxious and is not hyperactive.        Active Problem List     Patient Active Problem List   Diagnosis    Atrial flutter (HCC)    Chronic diastolic heart failure (HCC)    Chronic obstructive pulmonary disease (HCC)    Essential hypertension    GERD (gastroesophageal reflux disease)    Obstructive sleep apnea    Type 2 diabetes mellitus (HCC)    Chronic right-sided low back pain without sciatica    Hyponatremia    Morbid obesity (HCC)    Myocardial injury    Secondary hyperparathyroidism (HCC)    Stage 3a chronic kidney disease (HCC)    Thrombocytopenia (HCC)     Vision loss of right eye    Stage 3b chronic kidney disease (CKD) (HCC)    Ambulatory dysfunction    Chronic constipation    Advance care planning    Anemia, chronic disease    Primary insomnia    Coronary artery disease involving native coronary artery of native heart without angina pectoris    Acute gout of left hand    Chronic gout due to renal impairment of multiple sites without tophus    Rash of back    Persistent atrial fibrillation (HCC)    History of prostate cancer    Anticoagulant-induced bleeding (HCC)       Objective     Vital Signs:     BP: 136/62 mmHg  1/1/2025 08:01   Temp:97.5 °F  12/22/2024 11:50 Pulse:69 bpm  1/1/2025 08:01 Weight:228 Lbs     Resp:18 Breaths/min  12/22/2024 11:50 BS:142 mg/dL   O2:97 %  12/22/2024 11:50 Pain:0         Physical Exam  Vitals reviewed.   Constitutional:       General: He is not in acute distress.     Appearance: He is obese. He is not toxic-appearing or diaphoretic.   HENT:      Head: Normocephalic and atraumatic.      Right Ear: External ear normal.      Left Ear: External ear normal.      Nose: Nose normal. No rhinorrhea.      Mouth/Throat:      Mouth: Mucous membranes are dry.      Pharynx: Oropharynx is clear. No oropharyngeal exudate or posterior oropharyngeal erythema.   Eyes:      General: No scleral icterus.        Right eye: No discharge.         Left eye: No discharge.      Extraocular Movements: Extraocular movements intact.      Conjunctiva/sclera: Conjunctivae normal.      Pupils: Pupils are equal, round, and reactive to light.   Cardiovascular:      Rate and Rhythm: Normal rate and regular rhythm.   Pulmonary:      Effort: Pulmonary effort is normal. No respiratory distress.      Breath sounds: No wheezing or rales.   Abdominal:      General: Bowel sounds are normal.      Palpations: Abdomen is soft.      Tenderness: There is no abdominal tenderness. There is no guarding.   Musculoskeletal:         General: Swelling present. No tenderness.      Cervical  back: No rigidity.      Comments: Trace bilateral lower extremity edema (appears stable/improved overall from prior), no noted open wound/active drainage/erythema however with some excoriations/scratches present; compression stockings in place  No calf tenderness   Skin:     General: Skin is warm and dry.      Coloration: Skin is not jaundiced.   Neurological:      General: No focal deficit present.      Mental Status: He is alert and oriented to person, place, and time. Mental status is at baseline.   Psychiatric:         Mood and Affect: Mood normal.         Behavior: Behavior normal.         Thought Content: Thought content normal.         Judgment: Judgment normal.         Pertinent Laboratory/Diagnostic Studies:  Laboratory and Imaging studies reviewed. Full report in the paper chart.     Current Medications   Medications reviewed and updated in facility chart.      -Total time spent on this encounter today including documentation and workup review, face to face time, history and exam, and documentation/orders was approximately 50 minutes.  -This note will be copied to [facility] EMR where vitals and medication orders are placed.    Raúl Clemente D.O.  Geriatric Medicine  1/3/2025 4:23 PM

## 2025-01-03 NOTE — ASSESSMENT & PLAN NOTE
Stable, notes sleeping well lately and even better since diuretic was retimed to morning  Continue melatonin, stable since recent decrease to 3mg qHS  Also on trazodone, has been gradually weaned to 50mg qHS as of Oct 2024 and seems stable, will wean further to 25mg qHS  Consider further weaning if remaining stable  Encourage sleep hygiene  Continue to monitor

## 2025-01-03 NOTE — ASSESSMENT & PLAN NOTE
Reported hx of BPH and hx prostate CA s/p XRT as per chart review  No apparent acute/associated symptoms at present  Reports urinating well independently  Maintained on tamsulosin  Monitor for acute urinary change  Last PSA 0.14 from 2020. Will add to next labs to recheck.  Follow up with Urology as needed.

## 2025-01-03 NOTE — ASSESSMENT & PLAN NOTE
Monitor BP - generally controlled/stable around 110s-130s systolic  Pulse generally 60s-70s, sometimes borderline bradycardic in 50s, stable/asymptomatic  No acute cardiac complaints  Avoid hypotension  Continue regimen including carvedilol 1.56mg BID, terazosin 1mg, torsemide 40mg daily  Consider adjusting/weaning regimen as appropriate  Follow up with Cardiology

## 2025-01-03 NOTE — ASSESSMENT & PLAN NOTE
Wt Readings from Last 3 Encounters:   11/05/24 102 kg (224 lb 11.2 oz)   07/15/24 104 kg (229 lb 6.4 oz)   05/03/24 98 kg (216 lb)       Monitor daily weight - fairly stable, no alarming uptrend  Monitor volume status clinically - seems stable, peripheral edema fairly mild and stable, no orthopnea, no alarm symptoms of CHF exacerbation  Encourage use of compression stockings, he is wearing them  Continue torsemide 40mg daily, continue to monitor and adjust dose as appropriate  Follow up with Cardiology

## 2025-01-03 NOTE — ASSESSMENT & PLAN NOTE
Following Cardiology outpatient  Seems to be fairly recent onset Afib as per Cardiology eval  Had been started on Xarelto recently. Aspirin was discontinued as per Cardiology recommendation. Monitor for bleeding concern.  Patient was unhappy with Xarelto at original 20mg daily dose due to noticing much more minor bleeding from cuts/scrapes/shaving affecting quality of life; with shared decision-making he was agreeable to no more than 10mg daily dose of Xarelto which he is presently on since Dec 2024 (aware of risks of underdosing).  Cardiology considering Watchman procedure in future  Continue low dose carvedilol for rate control. If bradycardia worsens/becomes symptomatic Cardiology would consider pacemaker.  Cardiology deferred starting on antiarrythmic drug at present  No acute cardiac complaints  Follow up with Cardiology as appropriate/scheduled

## 2025-01-03 NOTE — ASSESSMENT & PLAN NOTE
Several instances of minor/self-limited bleeding since being on Xarelto. Not seeming to be dangerous and seem to be generally self-limited but very worrisome to patient and affecting his quality of life  Seems much improved/no longer a concern for patient since Xarelto dose reduction  See plan under Afib

## 2025-01-03 NOTE — ASSESSMENT & PLAN NOTE
Lab Results   Component Value Date    HGBA1C 6.0 (H) 08/24/2023     A1c very well controlled for age, recently 6.2  Monitor glucose - fasting sugars generally well controlled in low 100s, overall improved from prior  Avoid hypoglycemia  Continue insulin glargine 22u daily  Monitor A1c routinely  Encourage lifestyle modifications including healthy diet, weight management, exercise as appropriate  Follow up with Ophthalmology/Podiatry as appropriate

## 2025-02-04 ENCOUNTER — NURSING HOME VISIT (OUTPATIENT)
Dept: GERIATRICS | Facility: OTHER | Age: 80
End: 2025-02-04
Payer: COMMERCIAL

## 2025-02-04 DIAGNOSIS — F51.01 PRIMARY INSOMNIA: ICD-10-CM

## 2025-02-04 DIAGNOSIS — I10 ESSENTIAL HYPERTENSION: ICD-10-CM

## 2025-02-04 DIAGNOSIS — Z85.46 HISTORY OF PROSTATE CANCER: ICD-10-CM

## 2025-02-04 DIAGNOSIS — K59.09 CHRONIC CONSTIPATION: ICD-10-CM

## 2025-02-04 DIAGNOSIS — T45.7X1A ANTICOAGULANT-INDUCED BLEEDING (HCC): ICD-10-CM

## 2025-02-04 DIAGNOSIS — D68.9 ANTICOAGULANT-INDUCED BLEEDING (HCC): ICD-10-CM

## 2025-02-04 DIAGNOSIS — E11.22 TYPE 2 DIABETES MELLITUS WITH STAGE 3B CHRONIC KIDNEY DISEASE, WITH LONG-TERM CURRENT USE OF INSULIN (HCC): ICD-10-CM

## 2025-02-04 DIAGNOSIS — H54.61 VISION LOSS OF RIGHT EYE: ICD-10-CM

## 2025-02-04 DIAGNOSIS — N18.32 STAGE 3B CHRONIC KIDNEY DISEASE (CKD) (HCC): ICD-10-CM

## 2025-02-04 DIAGNOSIS — K21.9 GASTROESOPHAGEAL REFLUX DISEASE, UNSPECIFIED WHETHER ESOPHAGITIS PRESENT: ICD-10-CM

## 2025-02-04 DIAGNOSIS — D63.8 ANEMIA, CHRONIC DISEASE: ICD-10-CM

## 2025-02-04 DIAGNOSIS — I25.10 CORONARY ARTERY DISEASE INVOLVING NATIVE CORONARY ARTERY OF NATIVE HEART WITHOUT ANGINA PECTORIS: ICD-10-CM

## 2025-02-04 DIAGNOSIS — G89.29 CHRONIC LEFT SHOULDER PAIN: Primary | ICD-10-CM

## 2025-02-04 DIAGNOSIS — M1A.39X0 CHRONIC GOUT DUE TO RENAL IMPAIRMENT OF MULTIPLE SITES WITHOUT TOPHUS: ICD-10-CM

## 2025-02-04 DIAGNOSIS — J41.0 SIMPLE CHRONIC BRONCHITIS (HCC): ICD-10-CM

## 2025-02-04 DIAGNOSIS — Z79.4 TYPE 2 DIABETES MELLITUS WITH STAGE 3B CHRONIC KIDNEY DISEASE, WITH LONG-TERM CURRENT USE OF INSULIN (HCC): ICD-10-CM

## 2025-02-04 DIAGNOSIS — I50.32 CHRONIC DIASTOLIC HEART FAILURE (HCC): ICD-10-CM

## 2025-02-04 DIAGNOSIS — R26.2 AMBULATORY DYSFUNCTION: ICD-10-CM

## 2025-02-04 DIAGNOSIS — M25.512 CHRONIC LEFT SHOULDER PAIN: Primary | ICD-10-CM

## 2025-02-04 DIAGNOSIS — N18.32 TYPE 2 DIABETES MELLITUS WITH STAGE 3B CHRONIC KIDNEY DISEASE, WITH LONG-TERM CURRENT USE OF INSULIN (HCC): ICD-10-CM

## 2025-02-04 DIAGNOSIS — I48.19 PERSISTENT ATRIAL FIBRILLATION (HCC): ICD-10-CM

## 2025-02-04 PROCEDURE — 99309 SBSQ NF CARE MODERATE MDM 30: CPT | Performed by: STUDENT IN AN ORGANIZED HEALTH CARE EDUCATION/TRAINING PROGRAM

## 2025-02-05 NOTE — ASSESSMENT & PLAN NOTE
"Patient notes chronic/\"lifelong\" left shoulder soreness/pain, stable, mild, intermittent, no acute change  Demonstrates good ROM and no tenderness on exam  He is not interested in aggressive workup/intervention for this but requests topical [place] daily PRN, will order  Continue to monitor    "

## 2025-02-05 NOTE — ASSESSMENT & PLAN NOTE
At risk due to relative immobility, comorbidities including diabetes, iron supplement  Monitor stool output - Patient reports generally regular/easy BM recently stable  Bowel regimen at facility as ordered; continue miralax, docusate, senna regimen; consider bisacodyl suppository PRN if no BM in 2-3 days  Encourage mobility as tolerated, PO hydration as appropriate, high fiber diet/prune juice (in outpatient setting as appropriate)  Goal is for 1 easy BM every 1-2 days

## 2025-02-05 NOTE — ASSESSMENT & PLAN NOTE
Stable, notes sleeping well lately and even better since diuretic was retimed to morning  Continue melatonin, stable since recent decrease to 3mg qHS  Also on trazodone, has been gradually weaned to 25mg qHS and feels he is not yet ready to wean further  Consider further weaning if remaining stable  Encourage sleep hygiene  Continue to monitor

## 2025-02-05 NOTE — ASSESSMENT & PLAN NOTE
Patient has noted chronic stable central vision loss with intact peripheral vision in R eye  No acute change or associated pain  Ophthalmology following at facility  Continue to monitor for acute change

## 2025-02-05 NOTE — ASSESSMENT & PLAN NOTE
Several instances of minor/self-limited bleeding since being on Xarelto. Not seeming to be dangerous and seem to be generally self-limited but very worrisome to patient and affecting his quality of life  much improved/no longer a concern for patient since Xarelto dose reduction, he is happy with the lower dose  See plan under Afib

## 2025-02-05 NOTE — PROGRESS NOTES
[facility] Care  Facility: [facility]    PROGRESS NOTE  Nursing Home Place of Service: nursing home place of service: POS 32 Unskilled- No Part A Coverage    NAME: Laureano Adair  : 1945 AGE: 79 y.o. SEX: male MRN: [id number]  DATE OF ENCOUNTER: 2025    Assessment and Plan     Problem List Items Addressed This Visit       Chronic diastolic heart failure (HCC)    Wt Readings from Last 3 Encounters:   24 102 kg (224 lb 11.2 oz)   07/15/24 104 kg (229 lb 6.4 oz)   24 98 kg (216 lb)       Monitor daily weight - fairly stable, no alarming uptrend  Monitor volume status clinically - seems stable, peripheral edema fairly mild and stable, no orthopnea, no alarm symptoms of CHF exacerbation  Encourage use of compression stockings, he is wearing them  Continue torsemide 40mg daily, continue to monitor and adjust dose as appropriate  Follow up with Cardiology         Chronic obstructive pulmonary disease (HCC)    Seems to be quite mild, patient denies any recent breathing concerns  Has not been requiring any breathing treatments/inhalers  Continue to monitor  10/2013 outpatient PFTs reportedly showed mild obstruction  Consider outpatient PFTs with pulmonary as appropriate         Essential hypertension    Monitor BP - generally controlled/stable around 110s-130s systolic  Pulse generally 60s-70s, sometimes borderline bradycardic in 50s, stable/asymptomatic  No acute cardiac complaints  Avoid hypotension  Continue regimen including carvedilol 1.56mg BID, terazosin 1mg, torsemide 40mg daily  Consider adjusting/weaning regimen as appropriate  Follow up with Cardiology         GERD (gastroesophageal reflux disease)    Stable per patient  -monitor off medications  -recommend OOB with meals, sit upright for at least 30 minutes afterwards, avoid trigger foods  -continue to monitor  -follow up with GI as needed         Type 2 diabetes mellitus (HCC)      Lab Results   Component Value Date     HGBA1C 6.0 (H) 08/24/2023     A1c very well controlled for age, recently 6.2  Monitor glucose - fasting sugars generally well controlled in low 100s, overall improved from prior  Avoid hypoglycemia  Continue insulin glargine 22u daily  Monitor A1c routinely  Encourage lifestyle modifications including healthy diet, weight management, exercise as appropriate  Follow up with Ophthalmology/Podiatry as appropriate           Vision loss of right eye    Patient has noted chronic stable central vision loss with intact peripheral vision in R eye  No acute change or associated pain  Ophthalmology following at facility  Continue to monitor for acute change         Stage 3b chronic kidney disease (CKD) ([facility])    Lab Results   Component Value Date    EGFR 56 (L) 11/28/2023    EGFR 48 (L) 11/07/2023    EGFR 49 (L) 10/24/2023    CREATININE 1.28 11/28/2023    CREATININE 1.47 (H) 11/07/2023    CREATININE 1.44 (H) 10/24/2023     Reported hx of RCC s/p nephrectomy 2006  eGFR (baseline seems 30s-40s)  Seems consistent with CKD3b likely in setting of diabetes  Monitor renal function on routine labs - stable  Avoid nephrotoxins, NSAIDs as able  Encourage PO hydration, respecting volume status         Ambulatory dysfunction    Mainly uses wheelchair, at times rollator at baseline  Denies recent falls  -PT/OT as appropriate  -fall precautions  -encourage appropriate DME use         Chronic constipation    At risk due to relative immobility, comorbidities including diabetes, iron supplement  Monitor stool output - Patient reports generally regular/easy BM recently stable  Bowel regimen at facility as ordered; continue miralax, docusate, senna regimen; consider bisacodyl suppository PRN if no BM in 2-3 days  Encourage mobility as tolerated, PO hydration as appropriate, high fiber diet/prune juice (in outpatient setting as appropriate)  Goal is for 1 easy BM every 1-2 days         Anemia, chronic disease    Likely multifactorial in setting of  CKD and suspect iron deficiency  Continues on iron supplementation  -monitor on routine labs - stable  -monitor for acute bleed - no present signs  -consider further workup, Heme consult if persistent/worsening  -transfuse PRN Hb <7         Primary insomnia    Stable, notes sleeping well lately and even better since diuretic was retimed to morning  Continue melatonin, stable since recent decrease to 3mg qHS  Also on trazodone, has been gradually weaned to 25mg qHS and feels he is not yet ready to wean further  Consider further weaning if remaining stable  Encourage sleep hygiene  Continue to monitor         Coronary artery disease involving native coronary artery of native heart without angina pectoris    Per records, history of CAD resulting in an anterior wall myocardial infarction following which he underwent coronary artery bypass surgery in 1997  No acute cardiac complaints  Continue Xarelto, statin, carvedilol  Follow up with Cardiology         Chronic gout due to renal impairment of multiple sites without tophus    Noted hx of gout  No present acute/associated complaints. Did appear to have a recent flare involving finger in Sept 2024 treated with colchicine, flare seems resolved  Continue allopurinol  Continue to monitor         Persistent atrial fibrillation (HCC)    Following Cardiology outpatient  Seems to be fairly recent onset Afib as per Cardiology eval  Had been started on Xarelto recently. Aspirin was discontinued as per Cardiology recommendation. Monitor for bleeding concern.  Patient was unhappy with Xarelto at original 20mg daily dose due to noticing much more minor bleeding from cuts/scrapes/shaving affecting quality of life; with shared decision-making he was agreeable to no more than 10mg daily dose of Xarelto which he is presently on since Dec 2024 (aware of risks of underdosing).  Cardiology considering Watchman procedure in future  Continue low dose carvedilol for rate control. If  "bradycardia worsens/becomes symptomatic Cardiology would consider pacemaker.  Cardiology deferred starting on antiarrythmic drug at present  No acute cardiac complaints  Follow up with Cardiology as appropriate/scheduled         History of prostate cancer    Reported hx of BPH and hx prostate CA s/p XRT as per chart review  No apparent acute/associated symptoms at present  Reports urinating well independently  Maintained on tamsulosin  Monitor for acute urinary change  Recent PSA WNL  Follow up with Urology as needed.         Anticoagulant-induced bleeding (HCC)    Several instances of minor/self-limited bleeding since being on Xarelto. Not seeming to be dangerous and seem to be generally self-limited but very worrisome to patient and affecting his quality of life  much improved/no longer a concern for patient since Xarelto dose reduction, he is happy with the lower dose  See plan under Afib         Chronic left shoulder pain - Primary    Patient notes chronic/\"lifelong\" left shoulder soreness/pain, stable, mild, intermittent, no acute change  Demonstrates good ROM and no tenderness on exam  He is not interested in aggressive workup/intervention for this but requests topical Bengay daily PRN, will order  Continue to monitor                              Chief Complaint     Follow up 60 day    History of Present Illness     Laureano Adair is a 79 y.o. male who was seen today for follow up. PMH including CHF, atherosclerotic heart disease, hypertension, HLD, BPH, DM type 2, CKD, vitamin D deficiency, and COPD       Patient seen and examined in common area  Others present for encounter: none  Patient seated in chair (wheelchair) able to self-propel  Appears comfortable, awake, alert, oriented to situation, able to converse appropriately  Patient polite, in good spirits, Aox3, appears to be a reasonable historian, mildly [place]. Mentation seems stable compared to prior. He notes he feels well overall recently with no acute " concerns presently. Peripheral edema he thinks has been improved overall from prior and remaining mild/stable recently. He is happy with current timing of diuretic in the AM, since he is not having to get up as much to urinate overnight and has been sleeping well through the night. Breathing fine on room air, no acute respiratory symptoms or orthopnea. No CP/palpitations or acute orthostasis. Appetite good, no swallowing issues, no abd pain/N/V, endorses regular BM recently. No acute pain anywhere (has noted chronic bilateral knee pain with hx of bilat knee replacement which is stable and also notes stable chronic mild left shoulder pain for which he requests PRN topical Bengay and wants no further workup/intervention). No acute cardiopulmonary, abdominal, or urinary symptoms; see ROS for more details. Generally gets around using wheelchair or rollator and denies recent falls. He again notes plan to eventually move into apartment/with his wife.    No further questions or acute concerns identified.  No further acute concerns per nursing.         Lab Review:   12/18/23: CBC with Hb 9.5; CMP with GFR 51; uric acid 9.4; TSH WNL; A1c from Aug 2023 was 6.0  1/3/24: BMP with Cr 1.63 (baseline seems 1.5-1.8), eGFR 42 (baseline seems 30s-40s)  2/7/24: CBC with Hb 9.4 (stable, normocytic); BMP with Cr 1.78, eGFR 38; A1c 6.2  2/21/24: BMP with Cr 1.65, eGFR 41  6/10/24: BMP with Cr 1.62, eGFR 42; CBC with Hb 10.6  6/18/24: BMP with Cr 1.66, eGFR 41; CBC with Hb 10.3  9/23/24: CBC with Hb 10.4; BMP with Cr 1.54, eGFR 45; uric acid 8.9  9/30/24: BMP with Cr 1.58, eGFR 43  10/23/24: BMP with Cr 1.71, eGFR 40  12/9/24: BMP with Cr 1.51, eGFR 46; CBC with Hb 10.8 (borderline macrocytic)  1/13/25: PSA 0.03       Lab Results   Component Value Date    HGBA1C 6.0 (H) 08/24/2023     Lab Results   Component Value Date    TSH 2.69 10/14/2023     Lab Results   Component Value Date    KHGMKGWF24 450 07/12/2023     Lab Results   Component  Value Date    IRON 47 10/24/2022    TIBC 276 10/24/2022    FERRITIN 58 01/23/2021           Echo Oct 2023: difficult study, EF not reported, systolic function overall preserved, indeterminate diastolic dysfunction    The following portions of the patient's history were reviewed and updated as appropriate: allergies, current medications, past family history, past medical history, past social history, past surgical history and problem list.    Review of Systems     Review of Systems   Constitutional:  Negative for appetite change, chills, diaphoresis and fever.   HENT:  Positive for hearing loss (mild, chronic, stable). Negative for drooling, ear pain, rhinorrhea, sore throat and trouble swallowing.    Eyes:  Negative for pain, discharge, redness, itching and visual disturbance (chronic R eye central vision loss stable).   Respiratory:  Negative for cough, chest tightness, shortness of breath and wheezing.    Cardiovascular:  Positive for leg swelling (chronic, stable/improved). Negative for chest pain and palpitations.   Gastrointestinal:  Negative for abdominal pain, blood in stool, constipation, diarrhea, nausea and vomiting.   Genitourinary:  Negative for difficulty urinating, dysuria, flank pain and hematuria.   Musculoskeletal:  Positive for arthralgias and gait problem.   Skin:  Negative for color change.   Neurological:  Negative for dizziness, tremors, seizures, facial asymmetry, weakness and headaches.   Hematological:  Does not bruise/bleed easily.   Psychiatric/Behavioral:  Negative for agitation, behavioral problems and confusion. The patient is not nervous/anxious and is not hyperactive.        Active Problem List     Patient Active Problem List   Diagnosis    Atrial flutter (HCC)    Chronic diastolic heart failure (HCC)    Chronic obstructive pulmonary disease (HCC)    Essential hypertension    GERD (gastroesophageal reflux disease)    Obstructive sleep apnea    Type 2 diabetes mellitus (HCC)    Chronic  right-sided low back pain without sciatica    Hyponatremia    Morbid obesity (HCC)    Myocardial injury    Secondary hyperparathyroidism (HCC)    Stage 3a chronic kidney disease (HCC)    Thrombocytopenia (HCC)    Vision loss of right eye    Stage 3b chronic kidney disease (CKD) (HCC)    Ambulatory dysfunction    Chronic constipation    Advance care planning    Anemia, chronic disease    Primary insomnia    Coronary artery disease involving native coronary artery of native heart without angina pectoris    Acute gout of left hand    Chronic gout due to renal impairment of multiple sites without tophus    Rash of back    Persistent atrial fibrillation (HCC)    History of prostate cancer    Anticoagulant-induced bleeding (HCC)    Chronic left shoulder pain       Objective     Vital Signs:     BP: 118/56 mmHg  1/29/2025 08:07   Temp:97.5 °F  1/22/2025 07:44 Pulse:72 bpm  1/29/2025 08:07 Weight:223.8 Lbs  2/4/2025 13:59   Resp:17 Breaths/min  1/22/2025 07:44 BS:125 mg/dL  2/4/2025 08:15 O2:95 %  1/22/2025 07:44 Pain:0  2/4/2025 15:33       Physical Exam  Vitals reviewed.   Constitutional:       General: He is not in acute distress.     Appearance: He is obese. He is not toxic-appearing or diaphoretic.   HENT:      Head: Normocephalic and atraumatic.      Right Ear: External ear normal.      Left Ear: External ear normal.      Nose: Nose normal. No rhinorrhea.      Mouth/Throat:      Mouth: Mucous membranes are dry.      Pharynx: Oropharynx is clear. No oropharyngeal exudate or posterior oropharyngeal erythema.   Eyes:      General: No scleral icterus.        Right eye: No discharge.         Left eye: No discharge.      Extraocular Movements: Extraocular movements intact.      Conjunctiva/sclera: Conjunctivae normal.      Pupils: Pupils are equal, round, and reactive to light.   Cardiovascular:      Rate and Rhythm: Normal rate and regular rhythm.   Pulmonary:      Effort: Pulmonary effort is normal. No respiratory  distress.      Breath sounds: No wheezing or rales.   Abdominal:      General: Bowel sounds are normal.      Palpations: Abdomen is soft.      Tenderness: There is no abdominal tenderness. There is no guarding.   Musculoskeletal:         General: Swelling present. No tenderness.      Cervical back: No rigidity.      Comments: Trace bilateral lower extremity edema (appears stable/improved overall from prior), no noted open wound/active drainage/erythema  compression stockings in place  No calf tenderness   Skin:     General: Skin is warm and dry.      Coloration: Skin is not jaundiced.   Neurological:      General: No focal deficit present.      Mental Status: He is alert and oriented to person, place, and time. Mental status is at baseline.   Psychiatric:         Mood and Affect: Mood normal.         Behavior: Behavior normal.         Thought Content: Thought content normal.         Judgment: Judgment normal.         Pertinent Laboratory/Diagnostic Studies:  Laboratory and Imaging studies reviewed. Full report in the paper chart.     Current Medications   Medications reviewed and updated in facility chart.      -Total time spent on this encounter today including documentation and workup review, face to face time, history and exam, and documentation/orders was approximately 40 minutes.  -This note will be copied to [facility] EMR where vitals and medication orders are placed.    Raúl Clemente D.O.  Geriatric Medicine  2/4/2025 8:37 PM

## 2025-02-05 NOTE — ASSESSMENT & PLAN NOTE
Reported hx of BPH and hx prostate CA s/p XRT as per chart review  No apparent acute/associated symptoms at present  Reports urinating well independently  Maintained on tamsulosin  Monitor for acute urinary change  Recent PSA WNL  Follow up with Urology as needed.

## 2025-02-20 ENCOUNTER — NURSING HOME VISIT (OUTPATIENT)
Dept: GERIATRICS | Facility: OTHER | Age: 80
End: 2025-02-20
Payer: COMMERCIAL

## 2025-02-20 DIAGNOSIS — R05.1 ACUTE COUGH: Primary | ICD-10-CM

## 2025-02-20 PROCEDURE — 99308 SBSQ NF CARE LOW MDM 20: CPT

## 2025-02-26 VITALS
OXYGEN SATURATION: 96 % | SYSTOLIC BLOOD PRESSURE: 126 MMHG | HEART RATE: 64 BPM | TEMPERATURE: 97.9 F | DIASTOLIC BLOOD PRESSURE: 65 MMHG | WEIGHT: 224 LBS

## 2025-02-26 PROBLEM — R05.1 ACUTE COUGH: Status: ACTIVE | Noted: 2025-02-26

## 2025-02-27 NOTE — PROGRESS NOTES
"[facility]  [address] [place] [address], [place], [place]  [phone]  Facility: [facility]  Acute visit  POS 32    NAME: Laureano Adair  AGE: 80 y.o. SEX: male CODE STATUS: No CPR  : 1945     DATE: 25     Assessment and Plan:     Problem List Items Addressed This Visit       Acute cough - Primary    New moist cough noted over the last 2 weeks  Afebrile   Facility with flu A positive cases   Continue robitussin DM as needed  Send respiratory panel to r/o flu/covid   STAT CXR                Chief Complaint:     cough     History of Present Illness:     Patient being seen for ongoing moist cough.  Cough noted by nursing staff 2 weeks ago.  Patient reports he has had this cough \"forever\".  Remains afebrile.  Denies chest pain or shortness of breath.  Of note patient's facility has had positive flu A cases.        The following portions of the patient's history were reviewed and updated as appropriate: allergies, current medications, past family history, past medical history, past social history, past surgical history and problem list.     Review of Systems:     Review of Systems   Respiratory:  Positive for cough.    All other systems reviewed and are negative.       Problem List:     Patient Active Problem List   Diagnosis    Atrial flutter (HCC)    Chronic diastolic heart failure (HCC)    Chronic obstructive pulmonary disease (HCC)    Essential hypertension    GERD (gastroesophageal reflux disease)    Obstructive sleep apnea    Type 2 diabetes mellitus (HCC)    Chronic right-sided low back pain without sciatica    Hyponatremia    Morbid obesity (HCC)    Myocardial injury    Secondary hyperparathyroidism (HCC)    Stage 3a chronic kidney disease (HCC)    Thrombocytopenia (HCC)    Vision loss of right eye    Stage 3b chronic kidney disease (CKD) (HCC)    Ambulatory dysfunction    Chronic constipation    Advance care planning    Anemia, chronic disease    Primary insomnia    Coronary " artery disease involving native coronary artery of native heart without angina pectoris    Acute gout of left hand    Chronic gout due to renal impairment of multiple sites without tophus    Rash of back    Persistent atrial fibrillation (HCC)    History of prostate cancer    Anticoagulant-induced bleeding (HCC)    Chronic left shoulder pain    Acute cough        Objective:     /65   Pulse 64   Temp 97.9 °F (36.6 °C)   Wt 102 kg (224 lb)   SpO2 96%     Physical Exam  Vitals and nursing note reviewed.   Constitutional:       General: He is not in acute distress.     Appearance: Normal appearance. He is well-developed. He is not ill-appearing.   HENT:      Head: Normocephalic and atraumatic.   Eyes:      Conjunctiva/sclera: Conjunctivae normal.   Cardiovascular:      Rate and Rhythm: Normal rate and regular rhythm.      Heart sounds: No murmur heard.  Pulmonary:      Effort: Pulmonary effort is normal. No respiratory distress.      Breath sounds: Rhonchi present.   Abdominal:      Palpations: Abdomen is soft.      Tenderness: There is no abdominal tenderness.   Musculoskeletal:         General: No swelling.      Cervical back: Neck supple.   Skin:     General: Skin is warm and dry.      Capillary Refill: Capillary refill takes less than 2 seconds.   Neurological:      Mental Status: He is alert.   Psychiatric:         Mood and Affect: Mood normal.           VICKI Dangelo  Geriatric Medicine

## 2025-02-27 NOTE — ASSESSMENT & PLAN NOTE
New moist cough noted over the last 2 weeks  Afebrile   Facility with flu A positive cases   Continue robitussin DM as needed  Send respiratory panel to r/o flu/covid   STAT CXR

## 2025-03-11 ENCOUNTER — NURSING HOME VISIT (OUTPATIENT)
Dept: GERIATRICS | Facility: OTHER | Age: 80
End: 2025-03-11
Payer: COMMERCIAL

## 2025-03-11 DIAGNOSIS — I48.19 PERSISTENT ATRIAL FIBRILLATION (HCC): ICD-10-CM

## 2025-03-11 DIAGNOSIS — N18.32 STAGE 3B CHRONIC KIDNEY DISEASE (CKD) (HCC): ICD-10-CM

## 2025-03-11 DIAGNOSIS — I10 ESSENTIAL HYPERTENSION: ICD-10-CM

## 2025-03-11 DIAGNOSIS — M1A.39X0 CHRONIC GOUT DUE TO RENAL IMPAIRMENT OF MULTIPLE SITES WITHOUT TOPHUS: ICD-10-CM

## 2025-03-11 DIAGNOSIS — K59.09 CHRONIC CONSTIPATION: ICD-10-CM

## 2025-03-11 DIAGNOSIS — F51.01 PRIMARY INSOMNIA: ICD-10-CM

## 2025-03-11 DIAGNOSIS — G89.29 CHRONIC LEFT SHOULDER PAIN: ICD-10-CM

## 2025-03-11 DIAGNOSIS — D63.8 ANEMIA, CHRONIC DISEASE: ICD-10-CM

## 2025-03-11 DIAGNOSIS — Z79.4 TYPE 2 DIABETES MELLITUS WITH STAGE 3B CHRONIC KIDNEY DISEASE, WITH LONG-TERM CURRENT USE OF INSULIN (HCC): ICD-10-CM

## 2025-03-11 DIAGNOSIS — Z85.46 HISTORY OF PROSTATE CANCER: ICD-10-CM

## 2025-03-11 DIAGNOSIS — I50.32 CHRONIC DIASTOLIC HEART FAILURE (HCC): Primary | ICD-10-CM

## 2025-03-11 DIAGNOSIS — M25.512 CHRONIC LEFT SHOULDER PAIN: ICD-10-CM

## 2025-03-11 DIAGNOSIS — I25.10 CORONARY ARTERY DISEASE INVOLVING NATIVE CORONARY ARTERY OF NATIVE HEART WITHOUT ANGINA PECTORIS: ICD-10-CM

## 2025-03-11 DIAGNOSIS — H54.61 VISION LOSS OF RIGHT EYE: ICD-10-CM

## 2025-03-11 DIAGNOSIS — E11.22 TYPE 2 DIABETES MELLITUS WITH STAGE 3B CHRONIC KIDNEY DISEASE, WITH LONG-TERM CURRENT USE OF INSULIN (HCC): ICD-10-CM

## 2025-03-11 DIAGNOSIS — N18.32 TYPE 2 DIABETES MELLITUS WITH STAGE 3B CHRONIC KIDNEY DISEASE, WITH LONG-TERM CURRENT USE OF INSULIN (HCC): ICD-10-CM

## 2025-03-11 DIAGNOSIS — K21.9 GASTROESOPHAGEAL REFLUX DISEASE, UNSPECIFIED WHETHER ESOPHAGITIS PRESENT: ICD-10-CM

## 2025-03-11 DIAGNOSIS — R26.2 AMBULATORY DYSFUNCTION: ICD-10-CM

## 2025-03-11 DIAGNOSIS — J41.0 SIMPLE CHRONIC BRONCHITIS (HCC): ICD-10-CM

## 2025-03-11 PROCEDURE — 99309 SBSQ NF CARE MODERATE MDM 30: CPT | Performed by: STUDENT IN AN ORGANIZED HEALTH CARE EDUCATION/TRAINING PROGRAM

## 2025-03-12 PROBLEM — R05.1 ACUTE COUGH: Status: RESOLVED | Noted: 2025-02-26 | Resolved: 2025-03-12

## 2025-03-12 PROBLEM — M10.9 ACUTE GOUT OF LEFT HAND: Status: RESOLVED | Noted: 2024-09-23 | Resolved: 2025-03-12

## 2025-03-12 PROBLEM — R21 RASH OF BACK: Status: RESOLVED | Noted: 2024-10-03 | Resolved: 2025-03-12

## 2025-03-12 NOTE — PROGRESS NOTES
[facility]  Facility: [facility]    PROGRESS NOTE  Nursing Home Place of Service: nursing home place of service: POS 32 Unskilled- No Part A Coverage    NAME: Laureano Adair  : 1945 AGE: 80 y.o. SEX: male MRN: [id number]  DATE OF ENCOUNTER: 3/11/2025    Assessment and Plan     Problem List Items Addressed This Visit       Chronic diastolic heart failure (HCC) - Primary    Wt Readings from Last 3 Encounters:   25 102 kg (224 lb)   24 102 kg (224 lb 11.2 oz)   07/15/24 104 kg (229 lb 6.4 oz)       Monitor daily weight - fairly stable, no alarming uptrend  Monitor volume status clinically - seems stable, peripheral edema fairly mild and stable, no orthopnea, no alarm symptoms of CHF exacerbation  Encourage use of compression stockings, he is wearing them  Continue torsemide 40mg daily, continue to monitor and adjust dose as appropriate  Follow up with Cardiology         Chronic obstructive pulmonary disease (HCC)    Seems to be quite mild, patient denies any recent breathing concerns  Has not been requiring any breathing treatments/inhalers  Continue to monitor - stable on room air  10/2013 outpatient PFTs reportedly showed mild obstruction  Consider outpatient PFTs with pulmonary as appropriate         Essential hypertension    Monitor BP - generally controlled/stable around 110s-130s systolic  Pulse generally 50s-70s, sometimes borderline bradycardic in 50s, stable/asymptomatic  No acute cardiac complaints  Avoid hypotension  Continue regimen including carvedilol 1.56mg BID, terazosin 1mg, torsemide 40mg daily  Consider adjusting/weaning regimen as appropriate  Follow up with Cardiology         GERD (gastroesophageal reflux disease)    Stable per patient  -monitor off medications  -recommend OOB with meals, sit upright for at least 30 minutes afterwards, avoid trigger foods  -continue to monitor  -follow up with GI as needed         Type 2 diabetes mellitus (HCC)      Lab  Results   Component Value Date    HGBA1C 6.0 (H) 08/24/2023     A1c very well controlled for age, recently 6.2  Monitor glucose - fasting sugars generally well controlled in low-mid 100s  Avoid hypoglycemia  Continue insulin glargine 22u daily  Monitor A1c routinely, will recheck  Encourage lifestyle modifications including healthy diet, weight management, exercise as appropriate  Follow up with Ophthalmology/Podiatry as appropriate           Vision loss of right eye    Patient has noted chronic stable central vision loss with intact peripheral vision in R eye  No acute change or associated pain  Ophthalmology following at facility  Continue to monitor for acute change - stable         Stage 3b chronic kidney disease (CKD) ([facility])    Lab Results   Component Value Date    EGFR 56 (L) 11/28/2023    EGFR 48 (L) 11/07/2023    EGFR 49 (L) 10/24/2023    CREATININE 1.28 11/28/2023    CREATININE 1.47 (H) 11/07/2023    CREATININE 1.44 (H) 10/24/2023     Reported hx of RCC s/p nephrectomy 2006  eGFR (baseline seems 30s-40s)  Seems consistent with CKD3b likely in setting of diabetes  Monitor renal function on routine labs - stable  Avoid nephrotoxins, NSAIDs as able  Encourage PO hydration, respecting volume status         Ambulatory dysfunction    Mainly uses wheelchair, at times rollator at baseline  Denies recent falls  -PT/OT as appropriate  -fall precautions  -encourage appropriate DME use         Chronic constipation    At risk due to relative immobility, comorbidities including diabetes, iron supplement  Monitor stool output - Patient reports generally regular/easy BM recently stable  Bowel regimen at facility as ordered; continue miralax, docusate, senna regimen; consider bisacodyl suppository PRN if no BM in 2-3 days  Encourage mobility as tolerated, PO hydration as appropriate, high fiber diet/prune juice (in outpatient setting as appropriate)  Goal is for 1 easy BM every 1-2 days         Anemia, chronic disease     Likely multifactorial in setting of CKD and suspect iron deficiency  Continues on iron supplementation  -monitor on routine labs - stable  -monitor for acute bleed - no present signs  -consider further workup, Heme consult if persistent/worsening  -transfuse PRN Hb <7         Primary insomnia    Stable, notes sleeping well lately and even better since diuretic was previously retimed to morning  Continue melatonin, stable since recent decrease to 3mg qHS  Also on trazodone, has been gradually weaned to 25mg qHS and feels he is not yet ready to wean further  Consider further weaning if remaining stable  Encourage sleep hygiene  Continue to monitor         Coronary artery disease involving native coronary artery of native heart without angina pectoris    Per records, history of CAD resulting in an anterior wall myocardial infarction following which he underwent coronary artery bypass surgery in 1997  No acute cardiac complaints  Continue Xarelto, statin, carvedilol  Follow up with Cardiology         Chronic gout due to renal impairment of multiple sites without tophus    Noted hx of gout  No present acute/associated complaints. Did appear to have a recent flare involving finger in Sept 2024 treated with colchicine, flare seems resolved  Continue allopurinol  Continue to monitor         Persistent atrial fibrillation (HCC)    Following Cardiology outpatient  Seems to be fairly recent onset Afib as per Cardiology eval  Had been started on Xarelto recently. Aspirin was discontinued as per Cardiology recommendation. Monitor for bleeding concern.  Patient was unhappy with Xarelto at original 20mg daily dose due to noticing much more minor bleeding from cuts/scrapes/shaving affecting quality of life; with shared decision-making he was agreeable to no more than 10mg daily dose of Xarelto which he is presently on since Dec 2024 (aware of risks of underdosing and happy with current dose).  Cardiology considering Watchman  "procedure in future  Continue low dose carvedilol for rate control. If bradycardia worsens/becomes symptomatic Cardiology would consider pacemaker.  Cardiology deferred starting on antiarrythmic drug at present  No acute cardiac complaints  Follow up with Cardiology as appropriate/scheduled         History of prostate cancer    Reported hx of BPH and hx prostate CA s/p XRT as per chart review  No apparent acute/associated symptoms at present  Reports urinating well independently  Maintained on tamsulosin  Monitor for acute urinary change  Recent PSA WNL  Follow up with Urology as needed.         Chronic left shoulder pain    Patient has noted chronic/\"lifelong\" left shoulder soreness/pain, stable, mild, intermittent, no acute change  Demonstrated good ROM and no tenderness on exam  He has not been interested in aggressive workup/intervention for this but had requested topical Bengay daily PRN, which appears to be working well and he is happy with this  PT/OT/stretches as appropriate  Continue to monitor                                Chief Complaint     Follow up 30 day    History of Present Illness     Laureano Adair is a 80 y.o. male who was seen today for follow up. PMH including CHF, atherosclerotic heart disease, hypertension, HLD, BPH, DM type 2, CKD, vitamin D deficiency, and COPD       Patient seen and examined in common area  Others present for encounter: none  Patient seated in chair (wheelchair) able to self-propel  Appears comfortable, awake, alert, oriented to situation, able to converse appropriately  Patient polite, in good spirits, Aox3, appears to be a reasonable historian, mildly Robinson. Mentation seems stable compared to prior. He notes he feels well overall recently with no acute concerns. Peripheral edema he thinks has been well controlled and remaining mild/stable recently. He is happy with current timing of diuretic in the AM, since he is not having to get up as much to urinate overnight and has " been sleeping well through the night. Breathing fine on room air, no acute respiratory symptoms or orthopnea. No CP/palpitations or acute orthostasis. Appetite good, no swallowing issues, no abd pain/N/V, endorses regular easy BM recently. No acute pain anywhere (has noted chronic bilateral knee pain with hx of bilat knee replacement which is stable). No acute cardiopulmonary, abdominal, or urinary symptoms; see ROS for more details. Generally gets around using mainly wheelchair or rarely rollator and denies recent falls. He still hopes to eventually move into apartment/with his wife.    No further questions or acute concerns identified.  No further acute concerns per nursing.         Lab Review:   12/18/23: CBC with Hb 9.5; CMP with GFR 51; uric acid 9.4; TSH WNL; A1c from Aug 2023 was 6.0  1/3/24: BMP with Cr 1.63 (baseline seems 1.5-1.8), eGFR 42 (baseline seems 30s-40s)  2/7/24: CBC with Hb 9.4 (stable, normocytic); BMP with Cr 1.78, eGFR 38; A1c 6.2  2/21/24: BMP with Cr 1.65, eGFR 41  6/10/24: BMP with Cr 1.62, eGFR 42; CBC with Hb 10.6  6/18/24: BMP with Cr 1.66, eGFR 41; CBC with Hb 10.3  9/23/24: CBC with Hb 10.4; BMP with Cr 1.54, eGFR 45; uric acid 8.9  9/30/24: BMP with Cr 1.58, eGFR 43  10/23/24: BMP with Cr 1.71, eGFR 40  12/9/24: BMP with Cr 1.51, eGFR 46; CBC with Hb 10.8 (borderline macrocytic)  1/13/25: PSA 0.03  Next labs ordered for 3/17/25       Lab Results   Component Value Date    HGBA1C 6.0 (H) 08/24/2023     Lab Results   Component Value Date    TSH 2.69 10/14/2023     Lab Results   Component Value Date    TZZKRPPI25 450 07/12/2023     Lab Results   Component Value Date    IRON 47 10/24/2022    TIBC 276 10/24/2022    FERRITIN 58 01/23/2021           Echo Oct 2023: difficult study, EF not reported, systolic function overall preserved, indeterminate diastolic dysfunction    The following portions of the patient's history were reviewed and updated as appropriate: allergies, current medications,  past family history, past medical history, past social history, past surgical history and problem list.    Review of Systems     Review of Systems   Constitutional:  Negative for appetite change, chills, diaphoresis and fever.   HENT:  Positive for hearing loss (mild, chronic, stable). Negative for drooling, ear pain, rhinorrhea, sore throat and trouble swallowing.    Eyes:  Negative for pain, discharge, redness, itching and visual disturbance (chronic R eye central vision loss stable).   Respiratory:  Negative for cough, chest tightness, shortness of breath and wheezing.    Cardiovascular:  Positive for leg swelling (chronic, stable/improved). Negative for chest pain and palpitations.   Gastrointestinal:  Negative for abdominal pain, blood in stool, constipation, diarrhea, nausea and vomiting.   Genitourinary:  Negative for difficulty urinating, dysuria, flank pain and hematuria.   Musculoskeletal:  Positive for arthralgias (mild/chronic/stable) and gait problem.   Skin:  Negative for color change.   Neurological:  Negative for dizziness, tremors, seizures, facial asymmetry, weakness and headaches.   Hematological:  Does not bruise/bleed easily.   Psychiatric/Behavioral:  Negative for agitation, behavioral problems and confusion. The patient is not nervous/anxious and is not hyperactive.        Active Problem List     Patient Active Problem List   Diagnosis    Atrial flutter (HCC)    Chronic diastolic heart failure (HCC)    Chronic obstructive pulmonary disease (HCC)    Essential hypertension    GERD (gastroesophageal reflux disease)    Obstructive sleep apnea    Type 2 diabetes mellitus (HCC)    Chronic right-sided low back pain without sciatica    Hyponatremia    Morbid obesity (HCC)    Myocardial injury    Secondary hyperparathyroidism (HCC)    Stage 3a chronic kidney disease (HCC)    Thrombocytopenia (HCC)    Vision loss of right eye    Stage 3b chronic kidney disease (CKD) (HCC)    Ambulatory dysfunction     Chronic constipation    Advance care planning    Anemia, chronic disease    Primary insomnia    Coronary artery disease involving native coronary artery of native heart without angina pectoris    Chronic gout due to renal impairment of multiple sites without tophus    Persistent atrial fibrillation (HCC)    History of prostate cancer    Anticoagulant-induced bleeding (HCC)    Chronic left shoulder pain       Objective     Vital Signs:     BP: 117/64 mmHg  3/5/2025 08:46 Temp:97.9 °F  2/15/2025 09:28   Pulse:47 bpm  3/5/2025 08:46 Weight:227.7 Lbs  3/11/2025 12:51   Resp:18 Breaths/min  2/15/2025 09:28 BS:220 mg/dL  3/11/2025 20:28 O2:96 %  2/15/2025 09:28 Pain:0         Physical Exam  Vitals reviewed.   Constitutional:       General: He is not in acute distress.     Appearance: He is obese. He is not toxic-appearing or diaphoretic.   HENT:      Head: Normocephalic and atraumatic.      Right Ear: External ear normal.      Left Ear: External ear normal.      Nose: Nose normal. No rhinorrhea.      Mouth/Throat:      Mouth: Mucous membranes are dry.      Pharynx: Oropharynx is clear. No oropharyngeal exudate or posterior oropharyngeal erythema.   Eyes:      General: No scleral icterus.        Right eye: No discharge.         Left eye: No discharge.      Extraocular Movements: Extraocular movements intact.      Conjunctiva/sclera: Conjunctivae normal.      Pupils: Pupils are equal, round, and reactive to light.   Cardiovascular:      Rate and Rhythm: Normal rate and regular rhythm.   Pulmonary:      Effort: Pulmonary effort is normal. No respiratory distress.      Breath sounds: No wheezing or rales.   Abdominal:      General: Bowel sounds are normal. There is no distension.      Palpations: Abdomen is soft.      Tenderness: There is no abdominal tenderness. There is no guarding.   Musculoskeletal:         General: Swelling present. No tenderness.      Cervical back: No rigidity.      Comments: Trace bilateral lower  extremity edema (appears stable), no noted open wound/active drainage/erythema  compression stockings in place  No calf tenderness   Skin:     General: Skin is warm and dry.      Coloration: Skin is not jaundiced.   Neurological:      General: No focal deficit present.      Mental Status: He is alert and oriented to person, place, and time. Mental status is at baseline.   Psychiatric:         Mood and Affect: Mood normal.         Behavior: Behavior normal.         Thought Content: Thought content normal.         Judgment: Judgment normal.         Pertinent Laboratory/Diagnostic Studies:  Laboratory and Imaging studies reviewed. Full report in the paper chart.     Current Medications   Medications reviewed and updated in facility chart.      -Total time spent on this encounter today including documentation and workup review, face to face time, history and exam, and documentation/orders was approximately 40 minutes.  -This note will be copied to [facility] EMR where vitals and medication orders are placed.    Raúl Clemente D.O.  Geriatric Medicine  3/12/2025 11:51 AM

## 2025-03-12 NOTE — ASSESSMENT & PLAN NOTE
Lab Results   Component Value Date    HGBA1C 6.0 (H) 08/24/2023     A1c very well controlled for age, recently 6.2  Monitor glucose - fasting sugars generally well controlled in low-mid 100s  Avoid hypoglycemia  Continue insulin glargine 22u daily  Monitor A1c routinely, will recheck  Encourage lifestyle modifications including healthy diet, weight management, exercise as appropriate  Follow up with Ophthalmology/Podiatry as appropriate

## 2025-03-12 NOTE — ASSESSMENT & PLAN NOTE
Patient has noted chronic stable central vision loss with intact peripheral vision in R eye  No acute change or associated pain  Ophthalmology following at facility  Continue to monitor for acute change - stable

## 2025-03-12 NOTE — ASSESSMENT & PLAN NOTE
Seems to be quite mild, patient denies any recent breathing concerns  Has not been requiring any breathing treatments/inhalers  Continue to monitor - stable on room air  10/2013 outpatient PFTs reportedly showed mild obstruction  Consider outpatient PFTs with pulmonary as appropriate

## 2025-03-12 NOTE — ASSESSMENT & PLAN NOTE
"Patient has noted chronic/\"lifelong\" left shoulder soreness/pain, stable, mild, intermittent, no acute change  Demonstrated good ROM and no tenderness on exam  He has not been interested in aggressive workup/intervention for this but had requested topical Bengay daily PRN, which appears to be working well and he is happy with this  PT/OT/stretches as appropriate  Continue to monitor    "

## 2025-03-12 NOTE — ASSESSMENT & PLAN NOTE
Monitor BP - generally controlled/stable around 110s-130s systolic  Pulse generally 50s-70s, sometimes borderline bradycardic in 50s, stable/asymptomatic  No acute cardiac complaints  Avoid hypotension  Continue regimen including carvedilol 1.56mg BID, terazosin 1mg, torsemide 40mg daily  Consider adjusting/weaning regimen as appropriate  Follow up with Cardiology

## 2025-03-12 NOTE — ASSESSMENT & PLAN NOTE
Wt Readings from Last 3 Encounters:   02/20/25 102 kg (224 lb)   11/05/24 102 kg (224 lb 11.2 oz)   07/15/24 104 kg (229 lb 6.4 oz)       Monitor daily weight - fairly stable, no alarming uptrend  Monitor volume status clinically - seems stable, peripheral edema fairly mild and stable, no orthopnea, no alarm symptoms of CHF exacerbation  Encourage use of compression stockings, he is wearing them  Continue torsemide 40mg daily, continue to monitor and adjust dose as appropriate  Follow up with Cardiology

## 2025-03-12 NOTE — ASSESSMENT & PLAN NOTE
Following Cardiology outpatient  Seems to be fairly recent onset Afib as per Cardiology eval  Had been started on Xarelto recently. Aspirin was discontinued as per Cardiology recommendation. Monitor for bleeding concern.  Patient was unhappy with Xarelto at original 20mg daily dose due to noticing much more minor bleeding from cuts/scrapes/shaving affecting quality of life; with shared decision-making he was agreeable to no more than 10mg daily dose of Xarelto which he is presently on since Dec 2024 (aware of risks of underdosing and happy with current dose).  Cardiology considering Watchman procedure in future  Continue low dose carvedilol for rate control. If bradycardia worsens/becomes symptomatic Cardiology would consider pacemaker.  Cardiology deferred starting on antiarrythmic drug at present  No acute cardiac complaints  Follow up with Cardiology as appropriate/scheduled

## 2025-03-12 NOTE — ASSESSMENT & PLAN NOTE
Stable, notes sleeping well lately and even better since diuretic was previously retimed to morning  Continue melatonin, stable since recent decrease to 3mg qHS  Also on trazodone, has been gradually weaned to 25mg qHS and feels he is not yet ready to wean further  Consider further weaning if remaining stable  Encourage sleep hygiene  Continue to monitor

## 2025-03-28 ENCOUNTER — NURSING HOME VISIT (OUTPATIENT)
Dept: GERIATRICS | Facility: OTHER | Age: 80
End: 2025-03-28
Payer: COMMERCIAL

## 2025-03-28 DIAGNOSIS — Z79.4 TYPE 2 DIABETES MELLITUS WITH STAGE 3B CHRONIC KIDNEY DISEASE, WITH LONG-TERM CURRENT USE OF INSULIN (HCC): ICD-10-CM

## 2025-03-28 DIAGNOSIS — N18.32 STAGE 3B CHRONIC KIDNEY DISEASE (CKD) (HCC): ICD-10-CM

## 2025-03-28 DIAGNOSIS — E11.22 TYPE 2 DIABETES MELLITUS WITH STAGE 3B CHRONIC KIDNEY DISEASE, WITH LONG-TERM CURRENT USE OF INSULIN (HCC): ICD-10-CM

## 2025-03-28 DIAGNOSIS — N18.32 TYPE 2 DIABETES MELLITUS WITH STAGE 3B CHRONIC KIDNEY DISEASE, WITH LONG-TERM CURRENT USE OF INSULIN (HCC): ICD-10-CM

## 2025-03-28 DIAGNOSIS — I10 ESSENTIAL HYPERTENSION: ICD-10-CM

## 2025-03-28 DIAGNOSIS — D63.8 ANEMIA, CHRONIC DISEASE: ICD-10-CM

## 2025-03-28 DIAGNOSIS — I50.32 CHRONIC DIASTOLIC HEART FAILURE (HCC): Primary | ICD-10-CM

## 2025-03-28 DIAGNOSIS — I48.19 PERSISTENT ATRIAL FIBRILLATION (HCC): ICD-10-CM

## 2025-03-28 PROCEDURE — 99309 SBSQ NF CARE MODERATE MDM 30: CPT | Performed by: STUDENT IN AN ORGANIZED HEALTH CARE EDUCATION/TRAINING PROGRAM

## 2025-03-29 PROBLEM — E87.1 HYPONATREMIA: Status: RESOLVED | Noted: 2021-07-04 | Resolved: 2025-03-29

## 2025-03-29 NOTE — ASSESSMENT & PLAN NOTE
Monitor BP - generally controlled/stable around 110s-130s systolic  Pulse generally 50s-70s, sometimes borderline bradycardic in 50s, stable/asymptomatic  No acute cardiac complaints  Avoid hypotension  Continue regimen including carvedilol 1.56mg BID, terazosin 1mg, torsemide 60mg daily  Consider adjusting/weaning regimen as appropriate  Follow up with Cardiology

## 2025-03-29 NOTE — ASSESSMENT & PLAN NOTE
Wt Readings from Last 3 Encounters:   02/20/25 102 kg (224 lb)   11/05/24 102 kg (224 lb 11.2 oz)   07/15/24 104 kg (229 lb 6.4 oz)       Patient with chronic dyspnea on exertion and peripheral edema. Chart with noted diagnosis of CHF.   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 As of recent Cardiology visit Dec 2024 it was felt that further aggressive workup would be of low yield and unlikely to change treatment course given goals of care and that diuretic could be adjusted/titrated to comfort.  Monitor daily weight - some recent gradual/mild increase. He had been around 223lb range last month. Gradually increased in last few weeks most recently up to around 233lb range now.  Monitor volume status clinically - peripheral edema seems bit worsened, fortunately no orthopnea, no alarm symptoms of acute CHF exacerbation  Encourage use of compression stockings, he is wearing them  Continue low salt diet  Continue torsemide, will increase dose from 40 to 60mg daily, continue to monitor closely and adjust dose as appropriate  Follow up with Cardiology

## 2025-03-29 NOTE — ASSESSMENT & PLAN NOTE
Lab Results   Component Value Date    HGBA1C 6.0 (H) 08/24/2023     A1c well controlled for age, recently 7.2 (goal for age would be <7.5 ideally while avoiding hypoglycemia/overly tight control)  Monitor glucose - fasting sugars generally well controlled in low-mid 100s. Later in day typically high 100s-200s  Avoid hypoglycemia  Continue insulin glargine 22u daily  Will order to start on low-dose Jardiance 10mg daily as this would help with glycemic control as well as theoretically improve outcomes in CHF and should be tolerated with his level of CKD  Monitor A1c routinely  Encourage lifestyle modifications including healthy diet, weight management, exercise as appropriate  Follow up with Ophthalmology/Podiatry as appropriate

## 2025-03-29 NOTE — ASSESSMENT & PLAN NOTE
Lab Results   Component Value Date    EGFR 56 (L) 11/28/2023    EGFR 48 (L) 11/07/2023    EGFR 49 (L) 10/24/2023    CREATININE 1.28 11/28/2023    CREATININE 1.47 (H) 11/07/2023    CREATININE 1.44 (H) 10/24/2023     Reported hx of RCC s/p nephrectomy 2006  eGFR (baseline seems 30s-40s)  Seems consistent with CKD3b likely in setting of diabetes  Monitor renal function on routine labs - stable  Avoid nephrotoxins, NSAIDs as able  Encourage PO hydration, respecting volume status  Follow up with Nephrology as needed

## 2025-03-29 NOTE — ASSESSMENT & PLAN NOTE
Following Cardiology outpatient  Seems to be fairly recent onset Afib as per Cardiology eval  Had been started on Xarelto recently. Aspirin was discontinued as per Cardiology recommendation. Monitor for bleeding concern.  Patient was unhappy with Xarelto at original 20mg daily dose due to noticing much more minor bleeding from cuts/scrapes/shaving affecting quality of life; with shared decision-making he was agreeable to no more than 10mg daily dose of Xarelto which he is presently on since Dec 2024 (aware of risks of underdosing).  He again had recently asked nursing if he could switch from Xarelto to aspirin. Today I discussed with him again his thoughts on this. With shared decision-making decision made to keep Xarelto low-dose as it is since his prior anticoagulation-induced bleeding during shaving has been resolved and he is amenable to keeping the Xarelto at present.  Cardiology considering Watchman procedure in future  Continue low dose carvedilol 1.56mg BID for rate control. His HR is generally borderline bradycardic around 50s-60s and seems asymptomatic. Cardiology has advised against increasing beta blocker dose due to the bradycardia. If bradycardia worsens/becomes symptomatic may need to discontinue beta blocker and Cardiology would consider pacemaker.  Cardiology deferred starting on antiarrythmic drug  No acute cardiac complaints  Follow up with Cardiology as appropriate/scheduled (next Dec 2025)

## 2025-03-29 NOTE — PROGRESS NOTES
[facility]  Facility: [facility]    PROGRESS NOTE  Nursing Home Place of Service: nursing home place of service: POS 32 Unskilled- No Part A Coverage    NAME: Laureano Adair  : 1945 AGE: 80 y.o. SEX: male MRN: [id number]  DATE OF ENCOUNTER: 3/28/2025    Assessment and Plan     Problem List Items Addressed This Visit       Chronic diastolic heart failure (HCC) - Primary    Wt Readings from Last 3 Encounters:   25 102 kg (224 lb)   24 102 kg (224 lb 11.2 oz)   07/15/24 104 kg (229 lb 6.4 oz)       Patient with chronic dyspnea on exertion and peripheral edema. Chart with noted diagnosis of CHF.   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 As of recent Cardiology visit Dec 2024 it was felt that further aggressive workup would be of low yield and unlikely to change treatment course given goals of care and that diuretic could be adjusted/titrated to comfort.  Monitor daily weight - some recent gradual/mild increase. He had been around 223lb range last month. Gradually increased in last few weeks most recently up to around 233lb range now.  Monitor volume status clinically - peripheral edema seems bit worsened, fortunately no orthopnea, no alarm symptoms of acute CHF exacerbation  Encourage use of compression stockings, he is wearing them  Continue low salt diet  Continue torsemide, will increase dose from 40 to 60mg daily, continue to monitor closely and adjust dose as appropriate  Follow up with Cardiology          Essential hypertension    Monitor BP - generally controlled/stable around 110s-130s systolic  Pulse generally 50s-70s, sometimes borderline bradycardic in 50s, stable/asymptomatic  No acute cardiac complaints  Avoid hypotension  Continue regimen including carvedilol 1.56mg BID, terazosin 1mg, torsemide 60mg daily  Consider adjusting/weaning regimen as appropriate  Follow up with Cardiology         Type 2 diabetes mellitus (Formerly [facility])      Lab Results   Component Value Date    HGBA1C 6.0 (H) 08/24/2023     A1c well controlled for age, recently 7.2 (goal for age would be <7.5 ideally while avoiding hypoglycemia/overly tight control)  Monitor glucose - fasting sugars generally well controlled in low-mid 100s. Later in day typically high 100s-200s  Avoid hypoglycemia  Continue insulin glargine 22u daily  Will order to start on low-dose Jardiance 10mg daily as this would help with glycemic control as well as theoretically improve outcomes in CHF and should be tolerated with his level of CKD  Monitor A1c routinely  Encourage lifestyle modifications including healthy diet, weight management, exercise as appropriate  Follow up with Ophthalmology/Podiatry as appropriate           Stage 3b chronic kidney disease (CKD) (Formerly [facility])    Lab Results   Component Value Date    EGFR 56 (L) 11/28/2023    EGFR 48 (L) 11/07/2023    EGFR 49 (L) 10/24/2023    CREATININE 1.28 11/28/2023    CREATININE 1.47 (H) 11/07/2023    CREATININE 1.44 (H) 10/24/2023     Reported hx of RCC s/p nephrectomy 2006  eGFR (baseline seems 30s-40s)  Seems consistent with CKD3b likely in setting of diabetes  Monitor renal function on routine labs - stable  Avoid nephrotoxins, NSAIDs as able  Encourage PO hydration, respecting volume status  Follow up with Nephrology as needed         Anemia, chronic disease    Likely multifactorial in setting of CKD and suspect iron deficiency  Continues on iron supplementation  -monitor on routine labs - stable  -monitor for acute bleed -  no present signs  -consider further workup, Heme consult if persistent/worsening  -transfuse PRN Hb <7         Persistent atrial fibrillation (HCC)    Following Cardiology outpatient  Seems to be fairly recent onset Afib as per Cardiology eval  Had been started on Xarelto recently. Aspirin was discontinued as per Cardiology recommendation. Monitor for bleeding concern.  Patient was unhappy with Xarelto at original 20mg daily dose due to noticing much more minor bleeding from cuts/scrapes/shaving affecting quality of life; with shared decision-making he was agreeable to no more than 10mg daily dose of Xarelto which he is presently on since Dec 2024 (aware of risks of underdosing).  He again had recently asked nursing if he could switch from Xarelto to aspirin. Today I discussed with him again his thoughts on this. With shared decision-making decision made to keep Xarelto low-dose as it is since his prior anticoagulation-induced bleeding during shaving has been resolved and he is amenable to keeping the Xarelto at present.  Cardiology considering Watchman procedure in future  Continue low dose carvedilol 1.56mg BID for rate control. His HR is generally borderline bradycardic around 50s-60s and seems asymptomatic. Cardiology has advised against increasing beta blocker dose due to the bradycardia. If bradycardia worsens/becomes symptomatic may need to discontinue beta blocker and Cardiology would consider pacemaker.  Cardiology deferred starting on antiarrythmic drug  No acute cardiac complaints  Follow up with Cardiology as appropriate/scheduled (next Dec 2025)                                Chief Complaint     Follow up acute - CHF/edema    History of Present Illness     Laureano Adair is a 80 y.o. male who was seen today for follow up. PMH including CHF, atherosclerotic heart disease, hypertension, HLD, BPH, DM type 2, CKD, vitamin D deficiency, and COPD   Code Status: DNR    Asked by patient to see him for worsening  leg swelling/weight gain.    Patient seen and examined in common area  Others present for encounter: none  Patient seated in chair (wheelchair) able to self-propel  Appears comfortable, awake, alert, oriented to situation, able to converse appropriately  Patient polite, in good spirits, Aox3, appears to be a reasonable historian, mildly [place]. Mentation seems stable compared to prior. He notes he feels well overall recently with notable concern that he has gained a few pounds lately and feels her bilateral leg edema has been worse lately. See Plan. Breathing fine on room air, no acute respiratory symptoms including cough/wheeze or orthopnea. No CP/palpitations or acute orthostasis. Endorses sleeping well. Appetite remains good, no acute swallowing issues, no abd pain/N/V, endorses regular easy BM recently. No acute pain anywhere (has noted chronic bilateral knee pain with hx of bilat knee replacement which is stable). No acute cardiopulmonary, abdominal, or urinary symptoms; see ROS for more details.    No further questions or acute concerns identified.  No further acute concerns per nursing. He is continuing with a plan for likely moving out to independent living in near future.         Lab Review:   12/18/23: CBC with Hb 9.5; CMP with GFR 51; uric acid 9.4; TSH WNL; A1c from Aug 2023 was 6.0  1/3/24: BMP with Cr 1.63 (baseline seems 1.5-1.8), eGFR 42 (baseline seems 30s-40s)  2/7/24: CBC with Hb 9.4 (stable, normocytic); BMP with Cr 1.78, eGFR 38; A1c 6.2  2/21/24: BMP with Cr 1.65, eGFR 41  6/10/24: BMP with Cr 1.62, eGFR 42; CBC with Hb 10.6  6/18/24: BMP with Cr 1.66, eGFR 41; CBC with Hb 10.3  9/23/24: CBC with Hb 10.4; BMP with Cr 1.54, eGFR 45; uric acid 8.9  9/30/24: BMP with Cr 1.58, eGFR 43  10/23/24: BMP with Cr 1.71, eGFR 40  12/9/24: BMP with Cr 1.51, eGFR 46; CBC with Hb 10.8 (borderline macrocytic)  1/13/25: PSA 0.03  3/17/25: BMP with Cr 1.43, GFR 49; CBC with Hb 11.6; A1c 7.2       Lab Results  "  Component Value Date    HGBA1C 6.0 (H) 08/24/2023     Lab Results   Component Value Date    TSH 2.69 10/14/2023     Lab Results   Component Value Date    OHMYGQRN46 450 07/12/2023     Lab Results   Component Value Date    IRON 47 10/24/2022    TIBC 276 10/24/2022    FERRITIN 58 01/23/2021        CXR 2/20/25 \"FINDINGS: The heart is normal in size and shape. The lungs are free of infiltrate or effusion.  CONCLUSION: No active disease.\"     Echo Jan 2021: EF 40-45, normal diastolic dysfunction, some segmental hypokinesis, mild MR  Echo Oct 2023: difficult study, EF not reported, systolic function overall preserved, indeterminate diastolic dysfunction    The following portions of the patient's history were reviewed and updated as appropriate: allergies, current medications, past family history, past medical history, past social history, past surgical history and problem list.    Review of Systems     Review of Systems   Constitutional:  Negative for activity change, appetite change and fatigue.   HENT:  Negative for ear pain and trouble swallowing.    Respiratory:  Negative for cough, choking, chest tightness, shortness of breath and wheezing.    Cardiovascular:  Positive for leg swelling. Negative for chest pain.   Gastrointestinal:  Negative for abdominal pain, constipation, diarrhea, nausea and vomiting.   Genitourinary:  Negative for difficulty urinating.   Musculoskeletal:  Positive for gait problem.   Skin:  Negative for color change.   Neurological:  Negative for dizziness, speech difficulty, weakness, light-headedness and headaches.       Active Problem List     Patient Active Problem List   Diagnosis    Atrial flutter (HCC)    Chronic diastolic heart failure (HCC)    Chronic obstructive pulmonary disease (HCC)    Essential hypertension    GERD (gastroesophageal reflux disease)    Obstructive sleep apnea    Type 2 diabetes mellitus (HCC)    Chronic right-sided low back pain without sciatica    Morbid obesity " (HCC)    Myocardial injury    Secondary hyperparathyroidism (HCC)    Stage 3a chronic kidney disease (HCC)    Thrombocytopenia (HCC)    Vision loss of right eye    Stage 3b chronic kidney disease (CKD) (HCC)    Ambulatory dysfunction    Chronic constipation    Advance care planning    Anemia, chronic disease    Primary insomnia    Coronary artery disease involving native coronary artery of native heart without angina pectoris    Chronic gout due to renal impairment of multiple sites without tophus    Persistent atrial fibrillation (HCC)    History of prostate cancer    Anticoagulant-induced bleeding (HCC)    Chronic left shoulder pain       Objective     Vital Signs:     BP: 147/80 mmHg  3/26/2025 08:07   Temp:97.7 °F  3/21/2025 22:27 Pulse:63 bpm  3/26/2025 08:08   Weight:236.5 Lbs  3/28/2025 14:13   Resp:20 Breaths/min  3/21/2025 22:27 BS:130 mg/dL   O2:97 %  3/21/2025 22:27 Pain:0         Physical Exam  Vitals reviewed.   Constitutional:       General: He is not in acute distress.     Appearance: He is obese. He is not toxic-appearing or diaphoretic.   HENT:      Head: Normocephalic and atraumatic.      Right Ear: External ear normal.      Left Ear: External ear normal.      Nose: Nose normal. No rhinorrhea.      Mouth/Throat:      Mouth: Mucous membranes are dry.      Pharynx: Oropharynx is clear. No oropharyngeal exudate or posterior oropharyngeal erythema.   Eyes:      General: No scleral icterus.        Right eye: No discharge.         Left eye: No discharge.      Extraocular Movements: Extraocular movements intact.      Conjunctiva/sclera: Conjunctivae normal.      Pupils: Pupils are equal, round, and reactive to light.   Cardiovascular:      Rate and Rhythm: Normal rate and regular rhythm.   Pulmonary:      Effort: Pulmonary effort is normal. No respiratory distress.      Breath sounds: No wheezing or rales.   Abdominal:      General: Bowel sounds are normal. There is no distension.      Palpations: Abdomen  is soft.      Tenderness: There is no abdominal tenderness. There is no guarding.   Musculoskeletal:         General: Swelling present. No tenderness.      Cervical back: No rigidity.      Comments: 1-2+ bilateral lower extremity edema (appears worse than prior), no noted open wound/active drainage/erythema  compression stockings in place  No calf tenderness   Skin:     General: Skin is warm and dry.      Coloration: Skin is not jaundiced.   Neurological:      General: No focal deficit present.      Mental Status: He is alert and oriented to person, place, and time. Mental status is at baseline.   Psychiatric:         Mood and Affect: Mood normal.         Behavior: Behavior normal.         Thought Content: Thought content normal.         Judgment: Judgment normal.         Pertinent Laboratory/Diagnostic Studies:  Laboratory and Imaging studies reviewed. Full report in the paper chart.     Current Medications   Medications reviewed and updated in facility chart.      -Total time spent on this encounter today including documentation and workup review, face to face time, history and exam, and documentation/orders was approximately 40 minutes.  -This note will be copied to [facility] EMR where vitals and medication orders are placed.    Raúl Clemente D.O.  Geriatric Medicine  3/29/2025 9:03 AM

## 2025-04-04 NOTE — PROGRESS NOTES
[facility]  Camille Álvarez  [address], [place], [place]  [phone]  C [address]    NAME: Laureano Adair  AGE: 79 y.o. SEX: male  : 1945     DATE: 5/3/2024    CODE STATUS: No CPR     Assessment and Plan:     Problem List Items Addressed This Visit       Chronic diastolic heart failure (HCC)     Wt Readings from Last 3 Encounters:   24 98 kg (216 lb)   24 98.2 kg (216 lb 9.6 oz)   24 100 kg (221 lb)     Euvolemic, weight stable   Continue torsemide 20 mg BID  Continue beta blocker  Monitor weights   Routine BMP monitoring   F/u with cardiology next month          Chronic obstructive pulmonary disease (HCC)     Stable, no acute exacerbation          Essential hypertension     BP acceptable   Continue carvedilol 1.56 mg BID, torsemide 20 mg daily and terazosin 1 mg QHS  Monitor BP         Type 2 diabetes mellitus (Formerly [facility])     A1c 2024 6.2  BS log reviewed, trending 100s  Can decrease lantus from 23 u daily to 20 u daily  Monitor fasting accucheks   Repeat A1c in 3 months          Stage 3b chronic kidney disease (CKD) (Formerly [facility])     Lab Results   Component Value Date    EGFR 61 10/17/2023    EGFR 63 10/16/2023    EGFR 45 (L) 10/15/2023    CREATININE 1.20 10/17/2023    CREATININE 1.17 10/16/2023    CREATININE 1.54 (H) 10/15/2023     Cr baseline appears to be 1.5-1.8  Cr 2024 1.65  Avoid nephrotoxins, NSAIDs  Routine BMP monitoring, repeat in 3 months         Ambulatory dysfunction     Maintain fall and safety precautions  Encourage appropriate DME use, uses wheelchair mostly          Chronic constipation     Reports last BM yesterday   Continue bowel egimen at facility as ordered; change miralax from PRN to scheduled daily; continue senna-S qHS; consider bisacodyl suppository PRN if no BM in 2-3 days   Encourage po hydration          Cellulitis - Primary     RLE  Multiple open areas that patient scratched open   Redness to lower extremity has improved s/p abx    continue sarna cream to prevent patient from scratching, avoid open areas of leg                History of Present Illness:     Patient being seen for routine visit. He is seen and examined in his room without acute distress. He is able to self propel wheelchair. He was recently seen for concerns of cellulitis to right lower leg, he finished course of antibiotics. Leg appears to be looking better, reports sarna cream has helped with itching. He denies fever/chills. Denies CP/SOB/N/V/D. He reports occasional constipation, last bowel movement yesterday. Reports good appetite.         The following portions of the patient's history were reviewed and updated as appropriate: allergies, current medications, past family history, past medical history, past social history, past surgical history and problem list.     Review of Systems:     Review of Systems   Musculoskeletal:  Positive for arthralgias and gait problem.   Skin:  Positive for wound.        Itching RLE   All other systems reviewed and are negative.       Problem List:     Patient Active Problem List   Diagnosis    Atrial flutter (HCC)    Chronic diastolic heart failure (HCC)    Chronic obstructive pulmonary disease (HCC)    Essential hypertension    GERD (gastroesophageal reflux disease)    Obstructive sleep apnea    Type 2 diabetes mellitus (HCC)    Chronic right-sided low back pain without sciatica    Hyponatremia    Morbid obesity (HCC)    Myocardial injury    Secondary hyperparathyroidism (HCC)    Stage 3a chronic kidney disease (HCC)    Thrombocytopenia (HCC)    Left wrist pain    Stage 3b chronic kidney disease (CKD) (HCC)    Ambulatory dysfunction    Chronic constipation    Advance care planning    Anemia, chronic disease    Primary insomnia    Coronary artery disease involving native coronary artery of native heart without angina pectoris    Cellulitis        Objective:     /54   Pulse 74   Temp (!) 97.4 °F (36.3 °C)   Wt 98 kg (216 lb)    SpO2 99%     Physical Exam  Vitals and nursing note reviewed.   Constitutional:       General: He is not in acute distress.     Appearance: He is well-developed.   HENT:      Head: Normocephalic and atraumatic.   Eyes:      Conjunctiva/sclera: Conjunctivae normal.   Cardiovascular:      Rate and Rhythm: Normal rate and regular rhythm.      Heart sounds: No murmur heard.  Pulmonary:      Effort: Pulmonary effort is normal. No respiratory distress.      Breath sounds: Normal breath sounds.   Abdominal:      Palpations: Abdomen is soft.      Tenderness: There is no abdominal tenderness.   Musculoskeletal:      Cervical back: Neck supple.      Right lower leg: Edema present.      Left lower leg: Edema present.   Skin:     General: Skin is warm and dry.      Capillary Refill: Capillary refill takes less than 2 seconds.      Findings: Erythema and wound present.      Comments: Erythema to RLE improved, multiple open abrasions to RLE   Neurological:      General: No focal deficit present.      Mental Status: He is alert. Mental status is at baseline.   Psychiatric:         Mood and Affect: Mood normal.         Behavior: Behavior normal.         Pertinent Laboratory/Diagnostic Studies:    Laboratory Results: I have personally reviewed the pertinent laboratory results/reports     Radiology/Other Diagnostic Testing Results: I have personally reviewed pertinent reports.      VICKI Dangelo  Geriatric Medicine        abdominal pain

## 2025-04-07 ENCOUNTER — NURSING HOME VISIT (OUTPATIENT)
Dept: GERIATRICS | Facility: OTHER | Age: 80
End: 2025-04-07
Payer: COMMERCIAL

## 2025-04-07 DIAGNOSIS — I50.32 CHRONIC DIASTOLIC HEART FAILURE (HCC): ICD-10-CM

## 2025-04-07 DIAGNOSIS — Z79.4 TYPE 2 DIABETES MELLITUS WITH STAGE 3B CHRONIC KIDNEY DISEASE, WITH LONG-TERM CURRENT USE OF INSULIN (HCC): ICD-10-CM

## 2025-04-07 DIAGNOSIS — R21 RASH OF BACK: Primary | ICD-10-CM

## 2025-04-07 DIAGNOSIS — N18.32 TYPE 2 DIABETES MELLITUS WITH STAGE 3B CHRONIC KIDNEY DISEASE, WITH LONG-TERM CURRENT USE OF INSULIN (HCC): ICD-10-CM

## 2025-04-07 DIAGNOSIS — E11.22 TYPE 2 DIABETES MELLITUS WITH STAGE 3B CHRONIC KIDNEY DISEASE, WITH LONG-TERM CURRENT USE OF INSULIN (HCC): ICD-10-CM

## 2025-04-07 PROCEDURE — 99308 SBSQ NF CARE LOW MDM 20: CPT | Performed by: STUDENT IN AN ORGANIZED HEALTH CARE EDUCATION/TRAINING PROGRAM

## 2025-04-09 NOTE — ASSESSMENT & PLAN NOTE
Wt Readings from Last 3 Encounters:   02/20/25 102 kg (224 lb)   11/05/24 102 kg (224 lb 11.2 oz)   07/15/24 104 kg (229 lb 6.4 oz)       Patient with chronic dyspnea on exertion and peripheral edema. Chart with noted diagnosis of CHF.   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 As of recent Cardiology visit Dec 2024 it was felt that further aggressive workup would be of low yield and unlikely to change treatment course given goals of care and that diuretic could be adjusted/titrated to comfort.  Monitor daily weight - improved back to baseline range with recent titration of diuretic  Monitor volume status clinically - peripheral edema seems improved, no orthopnea, no alarm symptoms of acute CHF exacerbation  Encourage use of compression stockings, he is wearing them  Continue low salt diet  Continue torsemide, recent increase to 60mg daily, seems stable on this, continue to monitor closely and adjust dose as appropriate  Following with Cardiology

## 2025-04-09 NOTE — PROGRESS NOTES
[facility] Care  Facility: [facility]    PROGRESS NOTE  Nursing Home Place of Service: nursing home place of service: POS 32 Unskilled- No Part A Coverage    NAME: Laureano Adair  : 1945 AGE: 80 y.o. SEX: male MRN: [id number]  DATE OF ENCOUNTER: 2025    Assessment and Plan     Problem List Items Addressed This Visit       Chronic diastolic heart failure (HCC)    Wt Readings from Last 3 Encounters:   25 102 kg (224 lb)   24 102 kg (224 lb 11.2 oz)   07/15/24 104 kg (229 lb 6.4 oz)       Patient with chronic dyspnea on exertion and peripheral edema. Chart with noted diagnosis of CHF.   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 As of recent Cardiology visit Dec 2024 it was felt that further aggressive workup would be of low yield and unlikely to change treatment course given goals of care and that diuretic could be adjusted/titrated to comfort.  Monitor daily weight - improved back to baseline range with recent titration of diuretic  Monitor volume status clinically - peripheral edema seems improved, no orthopnea, no alarm symptoms of acute CHF exacerbation  Encourage use of compression stockings, he is wearing them  Continue low salt diet  Continue torsemide, recent increase to 60mg daily, seems stable on this, continue to monitor closely and adjust dose as appropriate  Following with Cardiology         Type 2 diabetes mellitus (HCC)      Lab Results   Component Value Date    HGBA1C 6.0 (H) 2023     A1c well  controlled for age, recently 7.2 (goal for age would be <7.5 ideally while avoiding hypoglycemia/overly tight control)  Monitor glucose - fasting sugars generally well controlled in low-mid 100s. Later in day typically high 100s - mid 200s  Avoid hypoglycemia  Continue insulin glargine 22u daily  Had planned to start Jardiance as this would help with glycemic control as well as theoretically improve outcomes in CHF and should be tolerated with his level of CKD - however patient requests to not be on Jardiance due to not wanting additional pill - cancelled Jardiance order  Monitor A1c routinely  Encourage lifestyle modifications including healthy diet, weight management, exercise as appropriate  Follow up with Ophthalmology/Podiatry as appropriate           Rash of back - Primary    Recurrence of bilateral flat hypopigmented lesion rash of upper back (had similar last year)  Seems consistent with tinea versicolor given he does sweat in this area  Has some associated small excoriations which appear consistent with his scratching; no sign of infected wound  Ordered topical ketoconazole course x14 days  Keep area clean/dry  Continue to monitor. Consider further workup, Dermatology consult if issue persists/worsens                                    Chief Complaint     Follow up acute - rash on back    History of Present Illness     Laureano Adair is a 80 y.o. male who was seen today for follow up. PMH including CHF, atherosclerotic heart disease, hypertension, HLD, BPH, DM type 2, CKD, vitamin D deficiency, and COPD   Code Status: DNR    Asked by patient to see him for upper back rash.    Patient seen and examined in common area  Others present for encounter: nurse Simon  Patient seated in chair (wheelchair) able to self-propel  Appears comfortable, awake, alert, oriented to situation, able to converse appropriately  Patient polite, in good spirits, Aox3, appears to be a reasonable historian, mildly [place]. Mentation  seems stable compared to prior. He notes he feels well overall recently but has noticed an itchy rash of upper back over last few days, has been scratching at it. Had similar last year, see Plan. Otherwise feeling well, feels edema improved since diuretic recently increased. Remainder oF ROS briefly reviewed and stable. Breathing fine on room air, no acute respiratory symptoms including cough/wheeze or orthopnea. No CP/palpitations or acute orthostasis.Appetite good, no acute swallowing issues, no abd pain/N/V, endorses regular easy BM recently. No acute pain anywhere (has noted chronic bilateral knee pain with hx of bilat knee replacement which is stable). No acute cardiopulmonary, abdominal, or urinary symptoms; see ROS for more details.    No further questions or acute concerns identified.  No further acute concerns per nursing. He is continuing with a plan for likely moving out to independent living in near future.         Lab Review:   12/18/23: CBC with Hb 9.5; CMP with GFR 51; uric acid 9.4; TSH WNL; A1c from Aug 2023 was 6.0  1/3/24: BMP with Cr 1.63 (baseline seems 1.5-1.8), eGFR 42 (baseline seems 30s-40s)  2/7/24: CBC with Hb 9.4 (stable, normocytic); BMP with Cr 1.78, eGFR 38; A1c 6.2  2/21/24: BMP with Cr 1.65, eGFR 41  6/10/24: BMP with Cr 1.62, eGFR 42; CBC with Hb 10.6  6/18/24: BMP with Cr 1.66, eGFR 41; CBC with Hb 10.3  9/23/24: CBC with Hb 10.4; BMP with Cr 1.54, eGFR 45; uric acid 8.9  9/30/24: BMP with Cr 1.58, eGFR 43  10/23/24: BMP with Cr 1.71, eGFR 40  12/9/24: BMP with Cr 1.51, eGFR 46; CBC with Hb 10.8 (borderline macrocytic)  1/13/25: PSA 0.03  3/17/25: BMP with Cr 1.43, GFR 49; CBC with Hb 11.6; A1c 7.2       Lab Results   Component Value Date    HGBA1C 6.0 (H) 08/24/2023     Lab Results   Component Value Date    TSH 2.69 10/14/2023     Lab Results   Component Value Date    BFFVTJCG90 450 07/12/2023     Lab Results   Component Value Date    IRON 47 10/24/2022    TIBC 276 10/24/2022     "FERRITIN 58 01/23/2021        CXR 2/20/25 \"FINDINGS: The heart is normal in size and shape. The lungs are free of infiltrate or effusion.  CONCLUSION: No active disease.\"     Echo Jan 2021: EF 40-45, normal diastolic dysfunction, some segmental hypokinesis, mild MR  Echo Oct 2023: difficult study, EF not reported, systolic function overall preserved, indeterminate diastolic dysfunction    The following portions of the patient's history were reviewed and updated as appropriate: allergies, current medications, past family history, past medical history, past social history, past surgical history and problem list.    Review of Systems     Review of Systems   Constitutional:  Negative for activity change, appetite change and fatigue.   HENT:  Negative for ear pain and trouble swallowing.    Respiratory:  Negative for cough, choking, chest tightness, shortness of breath and wheezing.    Cardiovascular:  Positive for leg swelling. Negative for chest pain and palpitations.   Gastrointestinal:  Negative for abdominal pain, constipation, diarrhea, nausea and vomiting.   Genitourinary:  Negative for difficulty urinating.   Musculoskeletal:  Positive for gait problem. Negative for arthralgias and back pain.   Skin:  Positive for rash. Negative for color change.   Neurological:  Negative for dizziness, speech difficulty, weakness, light-headedness and headaches.   Psychiatric/Behavioral:  Negative for agitation, behavioral problems and confusion. The patient is not nervous/anxious and is not hyperactive.        Active Problem List     Patient Active Problem List   Diagnosis    Atrial flutter (HCC)    Chronic diastolic heart failure (HCC)    Chronic obstructive pulmonary disease (HCC)    Essential hypertension    GERD (gastroesophageal reflux disease)    Obstructive sleep apnea    Type 2 diabetes mellitus (HCC)    Chronic right-sided low back pain without sciatica    Morbid obesity (HCC)    Myocardial injury    Secondary " hyperparathyroidism (HCC)    Stage 3a chronic kidney disease (HCC)    Thrombocytopenia (HCC)    Vision loss of right eye    Stage 3b chronic kidney disease (CKD) (HCC)    Ambulatory dysfunction    Chronic constipation    Advance care planning    Anemia, chronic disease    Primary insomnia    Coronary artery disease involving native coronary artery of native heart without angina pectoris    Chronic gout due to renal impairment of multiple sites without tophus    Rash of back    Persistent atrial fibrillation (HCC)    History of prostate cancer    Anticoagulant-induced bleeding (HCC)    Chronic left shoulder pain       Objective     Vital Signs:     BP: 136/52 mmHg  4/2/2025 08:13   Temp:97.7 °F  3/21/2025 22:27   Pulse:52 bpm  4/2/2025 08:14 Weight:230.6 Lbs     Resp:20 Breaths/min  3/21/2025 22:27 BS:239 mg/dL   O2:97 %  3/21/2025 22:27 Pain:0         Physical Exam  Vitals reviewed.   Constitutional:       General: He is not in acute distress.     Appearance: He is obese. He is not toxic-appearing or diaphoretic.   HENT:      Head: Normocephalic and atraumatic.      Right Ear: External ear normal.      Left Ear: External ear normal.      Nose: Nose normal. No rhinorrhea.      Mouth/Throat:      Mouth: Mucous membranes are dry.      Pharynx: Oropharynx is clear. No oropharyngeal exudate or posterior oropharyngeal erythema.   Eyes:      General: No scleral icterus.        Right eye: No discharge.         Left eye: No discharge.      Extraocular Movements: Extraocular movements intact.      Conjunctiva/sclera: Conjunctivae normal.      Pupils: Pupils are equal, round, and reactive to light.   Cardiovascular:      Rate and Rhythm: Normal rate and regular rhythm.   Pulmonary:      Effort: Pulmonary effort is normal. No respiratory distress.      Breath sounds: No wheezing or rales.   Abdominal:      General: Bowel sounds are normal. There is no distension.      Palpations: Abdomen is soft.      Tenderness: There is no  abdominal tenderness. There is no guarding.   Musculoskeletal:         General: Swelling present. No tenderness.      Cervical back: No rigidity.      Comments: 1+ bilateral lower extremity edema (appears better than prior), no noted open wound/active drainage/erythema  compression stockings in place  No calf tenderness   Skin:     General: Skin is warm and dry.      Coloration: Skin is not jaundiced.      Comments: Flat rash over bilateral upper back with hypopigmented lesions. Some small abrasions where patient indicated he has been scratching, no active bleed/drainage/pus   Neurological:      General: No focal deficit present.      Mental Status: He is alert and oriented to person, place, and time. Mental status is at baseline.   Psychiatric:         Mood and Affect: Mood normal.         Behavior: Behavior normal.         Thought Content: Thought content normal.         Judgment: Judgment normal.         Pertinent Laboratory/Diagnostic Studies:  Laboratory and Imaging studies reviewed. Full report in the paper chart.     Current Medications   Medications reviewed and updated in facility chart.      -Total time spent on this encounter today including documentation and workup review, face to face time, history and exam, and documentation/orders was approximately 20 minutes.  -This note will be copied to [facility] EMR where vitals and medication orders are placed.    Raúl Clemente D.O.  Geriatric Medicine  4/9/2025 7:05 AM

## 2025-04-09 NOTE — ASSESSMENT & PLAN NOTE
Lab Results   Component Value Date    HGBA1C 6.0 (H) 08/24/2023     A1c well controlled for age, recently 7.2 (goal for age would be <7.5 ideally while avoiding hypoglycemia/overly tight control)  Monitor glucose - fasting sugars generally well controlled in low-mid 100s. Later in day typically high 100s - mid 200s  Avoid hypoglycemia  Continue insulin glargine 22u daily  Had planned to start Jardiance as this would help with glycemic control as well as theoretically improve outcomes in CHF and should be tolerated with his level of CKD - however patient requests to not be on Jardiance due to not wanting additional pill - cancelled Jardiance order  Monitor A1c routinely  Encourage lifestyle modifications including healthy diet, weight management, exercise as appropriate  Follow up with Ophthalmology/Podiatry as appropriate

## 2025-04-09 NOTE — ASSESSMENT & PLAN NOTE
Recurrence of bilateral flat hypopigmented lesion rash of upper back (had similar last year)  Seems consistent with tinea versicolor given he does sweat in this area  Has some associated small excoriations which appear consistent with his scratching; no sign of infected wound  Ordered topical ketoconazole course x14 days  Keep area clean/dry  Continue to monitor. Consider further workup, Dermatology consult if issue persists/worsens

## 2025-04-11 ENCOUNTER — NURSING HOME VISIT (OUTPATIENT)
Dept: GERIATRICS | Facility: OTHER | Age: 80
End: 2025-04-11
Payer: COMMERCIAL

## 2025-04-11 DIAGNOSIS — R21 RASH OF BACK: ICD-10-CM

## 2025-04-11 DIAGNOSIS — Z85.46 HISTORY OF PROSTATE CANCER: ICD-10-CM

## 2025-04-11 DIAGNOSIS — Z79.4 TYPE 2 DIABETES MELLITUS WITH STAGE 3B CHRONIC KIDNEY DISEASE, WITH LONG-TERM CURRENT USE OF INSULIN (HCC): ICD-10-CM

## 2025-04-11 DIAGNOSIS — E11.22 TYPE 2 DIABETES MELLITUS WITH STAGE 3B CHRONIC KIDNEY DISEASE, WITH LONG-TERM CURRENT USE OF INSULIN (HCC): ICD-10-CM

## 2025-04-11 DIAGNOSIS — N18.32 TYPE 2 DIABETES MELLITUS WITH STAGE 3B CHRONIC KIDNEY DISEASE, WITH LONG-TERM CURRENT USE OF INSULIN (HCC): ICD-10-CM

## 2025-04-11 DIAGNOSIS — I25.10 CORONARY ARTERY DISEASE INVOLVING NATIVE CORONARY ARTERY OF NATIVE HEART WITHOUT ANGINA PECTORIS: ICD-10-CM

## 2025-04-11 DIAGNOSIS — I50.32 CHRONIC DIASTOLIC HEART FAILURE (HCC): Primary | ICD-10-CM

## 2025-04-11 DIAGNOSIS — R26.2 AMBULATORY DYSFUNCTION: ICD-10-CM

## 2025-04-11 DIAGNOSIS — N18.32 STAGE 3B CHRONIC KIDNEY DISEASE (CKD) (HCC): ICD-10-CM

## 2025-04-11 DIAGNOSIS — F51.01 PRIMARY INSOMNIA: ICD-10-CM

## 2025-04-11 DIAGNOSIS — K59.09 CHRONIC CONSTIPATION: ICD-10-CM

## 2025-04-11 DIAGNOSIS — J41.0 SIMPLE CHRONIC BRONCHITIS (HCC): ICD-10-CM

## 2025-04-11 DIAGNOSIS — K21.9 GASTROESOPHAGEAL REFLUX DISEASE, UNSPECIFIED WHETHER ESOPHAGITIS PRESENT: ICD-10-CM

## 2025-04-11 DIAGNOSIS — I10 ESSENTIAL HYPERTENSION: ICD-10-CM

## 2025-04-11 DIAGNOSIS — D63.8 ANEMIA, CHRONIC DISEASE: ICD-10-CM

## 2025-04-11 DIAGNOSIS — H54.61 VISION LOSS OF RIGHT EYE: ICD-10-CM

## 2025-04-11 DIAGNOSIS — M1A.39X0 CHRONIC GOUT DUE TO RENAL IMPAIRMENT OF MULTIPLE SITES WITHOUT TOPHUS: ICD-10-CM

## 2025-04-11 DIAGNOSIS — I48.19 PERSISTENT ATRIAL FIBRILLATION (HCC): ICD-10-CM

## 2025-04-11 PROCEDURE — 99309 SBSQ NF CARE MODERATE MDM 30: CPT | Performed by: STUDENT IN AN ORGANIZED HEALTH CARE EDUCATION/TRAINING PROGRAM

## 2025-04-14 PROBLEM — I5A MYOCARDIAL INJURY: Status: RESOLVED | Noted: 2023-05-19 | Resolved: 2025-04-14

## 2025-04-14 NOTE — PROGRESS NOTES
[facility]  Facility: [facility]    PROGRESS NOTE  Nursing Home Place of Service: nursing home place of service: POS 32 Unskilled- No Part A Coverage    NAME: Laureano Adair  : 1945 AGE: 80 y.o. SEX: male MRN: [id number]  DATE OF ENCOUNTER: 2025    Assessment and Plan     Problem List Items Addressed This Visit       Chronic diastolic heart failure (HCC) - Primary    Wt Readings from Last 3 Encounters:   25 102 kg (224 lb)   24 102 kg (224 lb 11.2 oz)   07/15/24 104 kg (229 lb 6.4 oz)       Patient with chronic dyspnea on exertion and peripheral edema. Chart with noted diagnosis of CHF.   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 As of recent Cardiology visit Dec 2024 it was felt that further aggressive workup would be of low yield and unlikely to change treatment course given goals of care and that diuretic could be adjusted/titrated to comfort.  Monitor daily weight - stable, no alarming uptrend  Monitor volume status clinically - peripheral edema seems improved to baseline, no orthopnea, no alarm symptoms of acute CHF exacerbation  Encourage use of compression stockings, he is wearing them regularly  Continue low salt diet  Continue torsemide, recent increase to 60mg daily, seems stable on this, continue to monitor closely and adjust dose as appropriate  Following with Cardiology         Chronic obstructive pulmonary disease (HCC)    Seems to be quite mild, patient denies any recent breathing concerns  Has  not been requiring any breathing treatments/inhalers  Continue to monitor - stable on room air  10/2013 outpatient PFTs reportedly showed mild obstruction  Consider outpatient PFTs with pulmonary as appropriate         Essential hypertension    Monitor BP - generally controlled/stable around 110s-130s systolic  Pulse generally 50s-70s, sometimes borderline bradycardic in 50s, stable/asymptomatic  No acute cardiac complaints  Avoid hypotension  Continue regimen including carvedilol 1.56mg BID, terazosin 1mg, torsemide 60mg daily  Consider adjusting/weaning regimen as appropriate  Follow up with Cardiology         GERD (gastroesophageal reflux disease)    Stable per patient  -monitor off medications  -recommend OOB with meals, sit upright for at least 30 minutes afterwards, avoid trigger foods  -continue to monitor  -follow up with GI as needed         Type 2 diabetes mellitus (HCC)      Lab Results   Component Value Date    HGBA1C 6.0 (H) 08/24/2023     A1c well controlled for age, recently 7.2 (goal for age would be <7.5 ideally while avoiding hypoglycemia/overly tight control)  Monitor glucose - fasting sugars generally well controlled in low-mid 100s. Later in day typically high 100s - mid 200s  Avoid hypoglycemia  Continue insulin glargine, will increase from 22u to 24u daily  Had planned to start Jardiance as this would help with glycemic control as well as theoretically improve outcomes in CHF and should be tolerated with his level of CKD - however patient requested to not add any further diabetic pills to regimen and plans to f/u with PCP as he plans to move out of facility in near future.  Monitor A1c routinely  Encourage lifestyle modifications including healthy diet, weight management, exercise as appropriate  Follow up with Ophthalmology/Podiatry as appropriate           Vision loss of right eye    Patient has noted chronic stable central vision loss with intact peripheral vision in R eye  No acute change  or associated pain  Ophthalmology following at facility  Continue to monitor for acute change - stable         Stage 3b chronic kidney disease (CKD) (HCC)    Lab Results   Component Value Date    EGFR 56 (L) 11/28/2023    EGFR 48 (L) 11/07/2023    EGFR 49 (L) 10/24/2023    CREATININE 1.28 11/28/2023    CREATININE 1.47 (H) 11/07/2023    CREATININE 1.44 (H) 10/24/2023     Reported hx of RCC s/p nephrectomy 2006  eGFR (baseline seems 30s-40s)  Seems consistent with CKD3b likely in setting of diabetes  Monitor renal function on routine labs - stable  Avoid nephrotoxins, NSAIDs as able  Encourage PO hydration, respecting volume status  Follow up with Nephrology as needed         Ambulatory dysfunction    Mainly uses wheelchair, at times rollator at baseline  Denies recent falls  -PT/OT as appropriate  -fall precautions  -encourage appropriate DME use         Chronic constipation    At risk due to relative immobility, comorbidities including diabetes, iron supplement  Monitor stool output - Patient reports generally regular/easy BM recently stable  Bowel regimen at facility as ordered; continue miralax, docusate, senna regimen; consider bisacodyl suppository PRN if no BM in 2-3 days  Encourage mobility as tolerated, PO hydration as appropriate, high fiber diet/prune juice (in outpatient setting as appropriate)  Goal is for 1 easy BM every 1-2 days         Anemia, chronic disease    Likely multifactorial in setting of CKD and suspect iron deficiency  Continues on iron supplementation  -monitor on routine labs - stable  -monitor for acute bleed - no present signs  -consider further workup, Heme consult if persistent/worsening  -transfuse PRN Hb <7         Primary insomnia    Stable, notes sleeping well lately and even better since diuretic was previously retimed to morning  Continue melatonin, stable since recent decrease to 3mg qHS  Also on trazodone, has been gradually weaned to 25mg qHS and feels he is not yet ready to  wean further  Consider further weaning if remaining stable  Encourage sleep hygiene  Continue to monitor         Coronary artery disease involving native coronary artery of native heart without angina pectoris    Per records, history of CAD resulting in an anterior wall myocardial infarction following which he underwent coronary artery bypass surgery in 1997  No acute cardiac complaints  Continue Xarelto, statin, carvedilol  Follow up with Cardiology         Chronic gout due to renal impairment of multiple sites without tophus    Noted hx of gout  No present acute/associated complaints. Did appear to have a recent flare involving finger in Sept 2024 treated with colchicine, flare seems resolved  Continue allopurinol  Continue to monitor         Rash of back    Recurrence of bilateral flat hypopigmented lesion rash of upper back (had similar last year)  Seems consistent with tinea versicolor given he does sweat in this area  Has some associated small excoriations which appear consistent with his scratching; no sign of infected wound, seems improving  Continue topical ketoconazole course x14 days for now, can be extended if needed  Keep area clean/dry  Continue to monitor. Consider further workup, Dermatology consult if issue persists/worsens. Appears overall improving symptomatically.         Persistent atrial fibrillation (HCC)    Following Cardiology outpatient  Seems to be fairly recent onset Afib as per Cardiology eval  Had been started on Xarelto recently. Aspirin was discontinued as per Cardiology recommendation. Monitor for bleeding concern.  Patient was unhappy with Xarelto at original 20mg daily dose due to noticing much more minor bleeding from cuts/scrapes/shaving affecting quality of life; with shared decision-making he was agreeable to no more than 10mg daily dose of Xarelto which he is presently on since Dec 2024 (aware of risks of underdosing).  Cardiology considering Watchman procedure in  future  Continue low dose carvedilol 1.56mg BID for rate control. His HR is generally borderline bradycardic around 50s-60s and seems asymptomatic. Cardiology has advised against increasing beta blocker dose due to the bradycardia. If bradycardia worsens/becomes symptomatic may need to discontinue beta blocker and Cardiology would consider pacemaker.  Cardiology deferred starting on antiarrythmic drug  No acute cardiac complaints  Follow up with Cardiology as appropriate/scheduled         History of prostate cancer    Reported hx of BPH and hx prostate CA s/p XRT as per chart review  No apparent acute/associated symptoms at present  Reports urinating well independently  Maintained on terazosin  Monitor for acute urinary change  Recent PSA WNL  Follow up with Urology as needed.                                    Chief Complaint     Follow up 60 day    History of Present Illness     Laureano Adair is a 80 y.o. male who was seen today for follow up. PMH including CHF, atherosclerotic heart disease, hypertension, HLD, BPH, DM type 2, CKD, vitamin D deficiency, and COPD   Code Status: DNR      Patient seen and examined in common room  Others present for encounter: none  Patient seated in chair (wheelchair) able to self-propel, playing [employer] on phone  Appears comfortable, awake, alert, oriented to situation, able to converse appropriately  Patient polite, in good spirits, Aox3, appears to be a reasonable historian, mildly [place]. Mentation seems stable compared to prior. He notes he feels well overall recently, no new/acute concerns. Recent back rash/ithing much improved since starting topical antifungal. Otherwise feeling well, feels edema improved/stable since diuretic recently increased.   Breathing fine on room air, no acute respiratory symptoms including cough/wheeze or orthopnea. No CP/palpitations or acute orthostasis. Appetite good, no acute swallowing issues, no abd pain/N/V, endorses regular easy BM recently.  "No acute pain anywhere (has noted chronic bilateral knee pain with hx of bilat knee replacement which is stable). No acute cardiopulmonary, abdominal, or urinary symptoms; see ROS for more details. Gets around well via WC lately and endorses some use of walker at times.    No further questions or acute concerns identified.  No further acute concerns per nursing. He is continuing with a plan for likely moving out to independent living in near future, anticipated in May 2025, and he intends to re-establish with outpatient PCP at that time.         Lab Review:   12/18/23: CBC with Hb 9.5; CMP with GFR 51; uric acid 9.4; TSH WNL; A1c from Aug 2023 was 6.0  1/3/24: BMP with Cr 1.63 (baseline seems 1.5-1.8), eGFR 42 (baseline seems 30s-40s)  2/7/24: CBC with Hb 9.4 (stable, normocytic); BMP with Cr 1.78, eGFR 38; A1c 6.2  2/21/24: BMP with Cr 1.65, eGFR 41  6/10/24: BMP with Cr 1.62, eGFR 42; CBC with Hb 10.6  6/18/24: BMP with Cr 1.66, eGFR 41; CBC with Hb 10.3  9/23/24: CBC with Hb 10.4; BMP with Cr 1.54, eGFR 45; uric acid 8.9  9/30/24: BMP with Cr 1.58, eGFR 43  10/23/24: BMP with Cr 1.71, eGFR 40  12/9/24: BMP with Cr 1.51, eGFR 46; CBC with Hb 10.8 (borderline macrocytic)  1/13/25: PSA 0.03  3/17/25: BMP with Cr 1.43, GFR 49; CBC with Hb 11.6; A1c 7.2       Lab Results   Component Value Date    HGBA1C 6.0 (H) 08/24/2023     Lab Results   Component Value Date    TSH 2.69 10/14/2023     Lab Results   Component Value Date    FDJWVRND87 450 07/12/2023     Lab Results   Component Value Date    IRON 47 10/24/2022    TIBC 276 10/24/2022    FERRITIN 58 01/23/2021        CXR 2/20/25 \"FINDINGS: The heart is normal in size and shape. The lungs are free of infiltrate or effusion.  CONCLUSION: No active disease.\"     Echo Jan 2021: EF 40-45, normal diastolic dysfunction, some segmental hypokinesis, mild MR  Echo Oct 2023: difficult study, EF not reported, systolic function overall preserved, indeterminate diastolic " dysfunction    The following portions of the patient's history were reviewed and updated as appropriate: allergies, current medications, past family history, past medical history, past social history, past surgical history and problem list.    Review of Systems     Review of Systems   Constitutional:  Negative for activity change, appetite change, chills, diaphoresis, fatigue and fever.   HENT:  Positive for hearing loss (mild, chronic, stable). Negative for drooling, ear pain, rhinorrhea, sore throat and trouble swallowing.    Eyes:  Negative for pain, discharge, redness, itching and visual disturbance (chronic R eye central vision loss stable).   Respiratory:  Negative for cough, choking, chest tightness, shortness of breath and wheezing.    Cardiovascular:  Positive for leg swelling. Negative for chest pain and palpitations.   Gastrointestinal:  Negative for abdominal pain, blood in stool, constipation, diarrhea, nausea and vomiting.   Genitourinary:  Negative for difficulty urinating, dysuria, flank pain and hematuria.   Musculoskeletal:  Positive for gait problem. Negative for arthralgias and back pain.   Skin:  Positive for rash. Negative for color change.   Neurological:  Negative for dizziness, tremors, seizures, facial asymmetry, speech difficulty, weakness, light-headedness and headaches.   Hematological:  Does not bruise/bleed easily.   Psychiatric/Behavioral:  Negative for agitation, behavioral problems and confusion. The patient is not nervous/anxious and is not hyperactive.        Active Problem List     Patient Active Problem List   Diagnosis    Atrial flutter (HCC)    Chronic diastolic heart failure (HCC)    Chronic obstructive pulmonary disease (HCC)    Essential hypertension    GERD (gastroesophageal reflux disease)    Obstructive sleep apnea    Type 2 diabetes mellitus (HCC)    Morbid obesity (HCC)    Secondary hyperparathyroidism (HCC)    Stage 3a chronic kidney disease (HCC)    Thrombocytopenia  (HCC)    Vision loss of right eye    Stage 3b chronic kidney disease (CKD) (HCC)    Ambulatory dysfunction    Chronic constipation    Advance care planning    Anemia, chronic disease    Primary insomnia    Coronary artery disease involving native coronary artery of native heart without angina pectoris    Chronic gout due to renal impairment of multiple sites without tophus    Rash of back    Persistent atrial fibrillation (HCC)    History of prostate cancer    Anticoagulant-induced bleeding (HCC)    Chronic left shoulder pain       Objective     Vital Signs:     BP: 126/74 mmHg  4/9/2025 08:10   Temp:97.7 °F  3/21/2025 22:27 Pulse:64 bpm  4/9/2025 08:09 Weight:231.1 Lbs     Resp:20 Breaths/min  3/21/2025 22:27 BS:127 mg/dL   O2:97 %  3/21/2025 22:27 Pain:0         Physical Exam  Vitals reviewed.   Constitutional:       General: He is not in acute distress.     Appearance: He is obese. He is not toxic-appearing or diaphoretic.   HENT:      Head: Normocephalic and atraumatic.      Right Ear: External ear normal.      Left Ear: External ear normal.      Nose: Nose normal. No rhinorrhea.      Mouth/Throat:      Mouth: Mucous membranes are dry.      Pharynx: Oropharynx is clear. No oropharyngeal exudate or posterior oropharyngeal erythema.   Eyes:      General: No scleral icterus.        Right eye: No discharge.         Left eye: No discharge.      Extraocular Movements: Extraocular movements intact.      Conjunctiva/sclera: Conjunctivae normal.      Pupils: Pupils are equal, round, and reactive to light.   Cardiovascular:      Rate and Rhythm: Normal rate and regular rhythm.   Pulmonary:      Effort: Pulmonary effort is normal. No respiratory distress.      Breath sounds: No wheezing or rales.   Abdominal:      General: Bowel sounds are normal. There is no distension.      Palpations: Abdomen is soft.      Tenderness: There is no abdominal tenderness. There is no guarding.   Musculoskeletal:         General: Swelling  present. No tenderness.      Cervical back: No rigidity.      Comments: 1+ bilateral lower extremity edema (appears better than prior), no noted open wound/active drainage/erythema  compression stockings in place  No calf tenderness   Skin:     General: Skin is warm and dry.      Coloration: Skin is not jaundiced.      Comments: Flat rash over bilateral upper back with hypopigmented lesions. Rash appears healing/faded from prior.   Neurological:      General: No focal deficit present.      Mental Status: He is alert and oriented to person, place, and time. Mental status is at baseline.   Psychiatric:         Mood and Affect: Mood normal.         Behavior: Behavior normal.         Thought Content: Thought content normal.         Judgment: Judgment normal.         Pertinent Laboratory/Diagnostic Studies:  Laboratory and Imaging studies reviewed. Full report in the paper chart.     Current Medications   Medications reviewed and updated in facility chart.      -Total time spent on this encounter today including documentation and workup review, face to face time, history and exam, and documentation/orders was approximately 40 minutes.  -This note will be copied to [facility] EMR where vitals and medication orders are placed.    Raúl Clemente D.O.  Geriatric Medicine  4/14/2025 11:07 AM

## 2025-04-14 NOTE — ASSESSMENT & PLAN NOTE
Lab Results   Component Value Date    HGBA1C 6.0 (H) 08/24/2023     A1c well controlled for age, recently 7.2 (goal for age would be <7.5 ideally while avoiding hypoglycemia/overly tight control)  Monitor glucose - fasting sugars generally well controlled in low-mid 100s. Later in day typically high 100s - mid 200s  Avoid hypoglycemia  Continue insulin glargine, will increase from 22u to 24u daily  Had planned to start Jardiance as this would help with glycemic control as well as theoretically improve outcomes in CHF and should be tolerated with his level of CKD - however patient requested to not add any further diabetic pills to regimen and plans to f/u with PCP as he plans to move out of facility in near future.  Monitor A1c routinely  Encourage lifestyle modifications including healthy diet, weight management, exercise as appropriate  Follow up with Ophthalmology/Podiatry as appropriate

## 2025-04-14 NOTE — ASSESSMENT & PLAN NOTE
Reported hx of BPH and hx prostate CA s/p XRT as per chart review  No apparent acute/associated symptoms at present  Reports urinating well independently  Maintained on terazosin  Monitor for acute urinary change  Recent PSA WNL  Follow up with Urology as needed.

## 2025-04-14 NOTE — ASSESSMENT & PLAN NOTE
Following Cardiology outpatient  Seems to be fairly recent onset Afib as per Cardiology eval  Had been started on Xarelto recently. Aspirin was discontinued as per Cardiology recommendation. Monitor for bleeding concern.  Patient was unhappy with Xarelto at original 20mg daily dose due to noticing much more minor bleeding from cuts/scrapes/shaving affecting quality of life; with shared decision-making he was agreeable to no more than 10mg daily dose of Xarelto which he is presently on since Dec 2024 (aware of risks of underdosing).  Cardiology considering Watchman procedure in future  Continue low dose carvedilol 1.56mg BID for rate control. His HR is generally borderline bradycardic around 50s-60s and seems asymptomatic. Cardiology has advised against increasing beta blocker dose due to the bradycardia. If bradycardia worsens/becomes symptomatic may need to discontinue beta blocker and Cardiology would consider pacemaker.  Cardiology deferred starting on antiarrythmic drug  No acute cardiac complaints  Follow up with Cardiology as appropriate/scheduled

## 2025-04-14 NOTE — ASSESSMENT & PLAN NOTE
Recurrence of bilateral flat hypopigmented lesion rash of upper back (had similar last year)  Seems consistent with tinea versicolor given he does sweat in this area  Has some associated small excoriations which appear consistent with his scratching; no sign of infected wound, seems improving  Continue topical ketoconazole course x14 days for now, can be extended if needed  Keep area clean/dry  Continue to monitor. Consider further workup, Dermatology consult if issue persists/worsens. Appears overall improving symptomatically.

## 2025-04-30 ENCOUNTER — NURSING HOME VISIT (OUTPATIENT)
Dept: GERIATRICS | Facility: OTHER | Age: 80
End: 2025-04-30
Payer: COMMERCIAL

## 2025-04-30 DIAGNOSIS — I48.19 PERSISTENT ATRIAL FIBRILLATION (HCC): ICD-10-CM

## 2025-04-30 DIAGNOSIS — M1A.39X0 CHRONIC GOUT DUE TO RENAL IMPAIRMENT OF MULTIPLE SITES WITHOUT TOPHUS: ICD-10-CM

## 2025-04-30 DIAGNOSIS — F51.01 PRIMARY INSOMNIA: ICD-10-CM

## 2025-04-30 DIAGNOSIS — G89.29 CHRONIC LEFT SHOULDER PAIN: ICD-10-CM

## 2025-04-30 DIAGNOSIS — D63.8 ANEMIA, CHRONIC DISEASE: ICD-10-CM

## 2025-04-30 DIAGNOSIS — I50.32 CHRONIC DIASTOLIC HEART FAILURE (HCC): Primary | ICD-10-CM

## 2025-04-30 DIAGNOSIS — H54.61 VISION LOSS OF RIGHT EYE: ICD-10-CM

## 2025-04-30 DIAGNOSIS — R26.2 AMBULATORY DYSFUNCTION: ICD-10-CM

## 2025-04-30 DIAGNOSIS — M25.512 CHRONIC LEFT SHOULDER PAIN: ICD-10-CM

## 2025-04-30 DIAGNOSIS — Z85.46 HISTORY OF PROSTATE CANCER: ICD-10-CM

## 2025-04-30 DIAGNOSIS — I10 ESSENTIAL HYPERTENSION: ICD-10-CM

## 2025-04-30 DIAGNOSIS — N18.32 TYPE 2 DIABETES MELLITUS WITH STAGE 3B CHRONIC KIDNEY DISEASE, WITH LONG-TERM CURRENT USE OF INSULIN (HCC): ICD-10-CM

## 2025-04-30 DIAGNOSIS — N18.32 STAGE 3B CHRONIC KIDNEY DISEASE (CKD) (HCC): ICD-10-CM

## 2025-04-30 DIAGNOSIS — K59.09 CHRONIC CONSTIPATION: ICD-10-CM

## 2025-04-30 DIAGNOSIS — K21.9 GASTROESOPHAGEAL REFLUX DISEASE, UNSPECIFIED WHETHER ESOPHAGITIS PRESENT: ICD-10-CM

## 2025-04-30 DIAGNOSIS — Z79.4 TYPE 2 DIABETES MELLITUS WITH STAGE 3B CHRONIC KIDNEY DISEASE, WITH LONG-TERM CURRENT USE OF INSULIN (HCC): ICD-10-CM

## 2025-04-30 DIAGNOSIS — I25.10 CORONARY ARTERY DISEASE INVOLVING NATIVE CORONARY ARTERY OF NATIVE HEART WITHOUT ANGINA PECTORIS: ICD-10-CM

## 2025-04-30 DIAGNOSIS — J41.0 SIMPLE CHRONIC BRONCHITIS (HCC): ICD-10-CM

## 2025-04-30 DIAGNOSIS — E11.22 TYPE 2 DIABETES MELLITUS WITH STAGE 3B CHRONIC KIDNEY DISEASE, WITH LONG-TERM CURRENT USE OF INSULIN (HCC): ICD-10-CM

## 2025-04-30 PROCEDURE — 99316 NF DSCHRG MGMT 30 MIN+: CPT | Performed by: STUDENT IN AN ORGANIZED HEALTH CARE EDUCATION/TRAINING PROGRAM

## 2025-04-30 RX ORDER — DOCUSATE SODIUM 100 MG/1
100 CAPSULE, LIQUID FILLED ORAL 2 TIMES DAILY
Qty: 60 CAPSULE | Refills: 0 | Status: SHIPPED | OUTPATIENT
Start: 2025-04-30

## 2025-04-30 RX ORDER — INSULIN GLARGINE 100 [IU]/ML
24 INJECTION, SOLUTION SUBCUTANEOUS DAILY
Qty: 10 ML | Refills: 0 | Status: SHIPPED | OUTPATIENT
Start: 2025-04-30

## 2025-04-30 RX ORDER — FINASTERIDE 5 MG/1
5 TABLET, FILM COATED ORAL DAILY
Qty: 30 TABLET | Refills: 0 | Status: SHIPPED | OUTPATIENT
Start: 2025-04-30

## 2025-04-30 RX ORDER — TERAZOSIN 1 MG/1
1 CAPSULE ORAL
Qty: 30 CAPSULE | Refills: 0 | Status: SHIPPED | OUTPATIENT
Start: 2025-04-30

## 2025-04-30 RX ORDER — TORSEMIDE 20 MG/1
60 TABLET ORAL DAILY
Qty: 90 TABLET | Refills: 0 | Status: SHIPPED | OUTPATIENT
Start: 2025-04-30

## 2025-04-30 RX ORDER — TRAZODONE HYDROCHLORIDE 50 MG/1
25 TABLET ORAL
Qty: 15 TABLET | Refills: 0 | Status: SHIPPED | OUTPATIENT
Start: 2025-04-30

## 2025-04-30 RX ORDER — SENNOSIDES A AND B 8.6 MG/1
2 TABLET, FILM COATED ORAL
Qty: 60 TABLET | Refills: 0 | Status: SHIPPED | OUTPATIENT
Start: 2025-04-30

## 2025-04-30 RX ORDER — ALLOPURINOL 100 MG/1
100 TABLET ORAL DAILY
Qty: 30 TABLET | Refills: 0 | Status: SHIPPED | OUTPATIENT
Start: 2025-04-30

## 2025-04-30 RX ORDER — ATORVASTATIN CALCIUM 40 MG/1
40 TABLET, FILM COATED ORAL DAILY
Qty: 30 TABLET | Refills: 0 | Status: SHIPPED | OUTPATIENT
Start: 2025-04-30

## 2025-04-30 RX ORDER — FERROUS SULFATE 325(65) MG
1 TABLET ORAL DAILY
Qty: 30 TABLET | Refills: 0 | Status: SHIPPED | OUTPATIENT
Start: 2025-04-30

## 2025-04-30 RX ORDER — CARVEDILOL 3.12 MG/1
1.56 TABLET ORAL 2 TIMES DAILY WITH MEALS
Qty: 30 TABLET | Refills: 0 | Status: SHIPPED | OUTPATIENT
Start: 2025-04-30

## 2025-04-30 NOTE — ASSESSMENT & PLAN NOTE
Patient has noted chronic stable central vision loss with intact peripheral vision in R eye  No acute change or associated pain  Ophthalmology following at facility  Continue to monitor for acute change - stable  F/u with PCP, Ophtho

## 2025-04-30 NOTE — ASSESSMENT & PLAN NOTE
Stable per patient  -monitor off medications  -recommend OOB with meals, sit upright for at least 30 minutes afterwards, avoid trigger foods  -continue to monitor  -follow up with GI as needed   yes

## 2025-04-30 NOTE — PROGRESS NOTES
[facility]  Facility: [facility]    DISCHARGE SUMMARY  Nursing Home Place of Service: 32: NF- Long Term    NAME: Laureano Adair  : 1945 AGE: 80 y.o. SEX: male MRN: [id number]  DATE OF ENCOUNTER: 2025    DATE OF ADMISSION: 12/10/2023 DATE OF DISCHARGE:25 DISCHARGE DISPOSITION: stable, to independent living    HPI: Laureano Adair is a 80 y.o. male who was seen today for follow up. PMH including CHF, atherosclerotic heart disease, hypertension, HLD, BPH, DM type 2, CKD, vitamin D deficiency, and COPD   Code Status: DNR  Patient was admitted to facility for long-term care. He has been stable lately and with plan to move out to independent living. I have discussed with Social Work. Patient will be moving to [facility] for independent living. He will be establishing with outpatient PCP (reportedly with Dr. Wade per ).    Patient seen and examined in dining room  Others present for encounter: none  Patient seated in chair (wheelchair) able to self-propel, playing solitaire on phone  Appears comfortable, awake, alert, oriented to situation, able to converse appropriately  Patient polite, in good spirits, Aox3, appears to be a reasonable historian, mildly [place]. Mentation seems stable compared to prior. He notes he feels well overall recently, no new/acute concerns. He feels very happy and safe moving to independent living tomorrow.  Breathing fine on room air, no acute respiratory symptoms including cough/wheeze or orthopnea. Feels his peripheral edema improved and well controlled an current torsemide dose. No CP/palpitations or acute orthostasis. Appetite good, no acute swallowing issues, no abd pain/N/V, endorses regular easy BM recently. No acute pain anywhere (has noted chronic bilateral knee pain with hx of bilat knee replacement which is stable). No acute cardiopulmonary, abdominal, or urinary symptoms; see ROS for more details. Gets around well via WC lately and  "endorses some use of walker at times typically several times a day around the unit and endorses feeling steady with walker.     No further questions or acute concerns identified.  No further acute concerns per nursing or SW Delfina.           Lab Review:   12/18/23: CBC with Hb 9.5; CMP with GFR 51; uric acid 9.4; TSH WNL; A1c from Aug 2023 was 6.0  1/3/24: BMP with Cr 1.63 (baseline seems 1.5-1.8), eGFR 42 (baseline seems 30s-40s)  2/7/24: CBC with Hb 9.4 (stable, normocytic); BMP with Cr 1.78, eGFR 38; A1c 6.2  2/21/24: BMP with Cr 1.65, eGFR 41  6/10/24: BMP with Cr 1.62, eGFR 42; CBC with Hb 10.6  6/18/24: BMP with Cr 1.66, eGFR 41; CBC with Hb 10.3  9/23/24: CBC with Hb 10.4; BMP with Cr 1.54, eGFR 45; uric acid 8.9  9/30/24: BMP with Cr 1.58, eGFR 43  10/23/24: BMP with Cr 1.71, eGFR 40  12/9/24: BMP with Cr 1.51, eGFR 46; CBC with Hb 10.8 (borderline macrocytic)  1/13/25: PSA 0.03  3/17/25: BMP with Cr 1.43, GFR 49; CBC with Hb 11.6; A1c 7.2              Lab Results   Component Value Date     HGBA1C 6.0 (H) 08/24/2023            Lab Results   Component Value Date     TSH 2.69 10/14/2023            Lab Results   Component Value Date     KWYRZSVQ29 450 07/12/2023            Lab Results   Component Value Date     IRON 47 10/24/2022     TIBC 276 10/24/2022     FERRITIN 58 01/23/2021         CXR 2/20/25 \"FINDINGS: The heart is normal in size and shape. The lungs are free of infiltrate or effusion.  CONCLUSION: No active disease.\"     Echo Jan 2021: EF 40-45, normal diastolic dysfunction, some segmental hypokinesis, mild MR  Echo Oct 2023: difficult study, EF not reported, systolic function overall preserved, indeterminate diastolic dysfunction      Assessment/Plan:    Chronic diastolic heart failure (HCC)  Wt Readings from Last 3 Encounters:   02/20/25 102 kg (224 lb)   11/05/24 102 kg (224 lb 11.2 oz)   07/15/24 104 kg (229 lb 6.4 oz)       Patient with chronic dyspnea on exertion and peripheral edema. Chart with " noted diagnosis of CHF.   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 As of recent Cardiology visit Dec 2024 it was felt that further aggressive workup would be of low yield and unlikely to change treatment course given goals of care and that diuretic could be adjusted/titrated to comfort.  Monitor daily weight - stable, no alarming uptrend  Monitor volume status clinically - peripheral edema seems improved to baseline, no orthopnea, no alarm symptoms of acute CHF exacerbation  Encourage use of compression stockings, he is wearing them regularly  Continue low salt diet  Continue torsemide, recent increase to 60mg daily, seems stable on this, continue to monitor closely and adjust dose as appropriate  Following with Cardiology    Coronary artery disease involving native coronary artery of native heart without angina pectoris  Per records, history of CAD resulting in an anterior wall myocardial infarction following which he underwent coronary artery bypass surgery in 1997  No acute cardiac complaints  Continue Xarelto, statin, carvedilol  Follow up with Cardiology    Essential hypertension  Monitor BP - generally controlled/stable around 110s-130s systolic  Pulse generally 50s-70s, sometimes borderline bradycardic in 50s, stable/asymptomatic  No acute cardiac complaints  Avoid hypotension  Continue regimen including carvedilol 1.56mg BID, terazosin 1mg daily, torsemide 60mg daily  Consider adjusting/weaning regimen as appropriate  Follow up with  Cardiology    Persistent atrial fibrillation (HCC)  Following Cardiology outpatient  Seems to be fairly recent onset Afib as per Cardiology eval  Had been started on Xarelto recently. Aspirin was discontinued as per Cardiology recommendation. Monitor for bleeding concern.  Patient was unhappy with Xarelto at original 20mg daily dose due to noticing much more minor bleeding from cuts/scrapes/shaving affecting quality of life; with shared decision-making he was agreeable to no more than 10mg daily dose of Xarelto which he is presently on since Dec 2024 (aware of risks of underdosing).  Cardiology considering Watchman procedure in future  Continue low dose carvedilol 1.56mg BID for rate control. His HR is generally borderline bradycardic around 50s-60s and seems asymptomatic. Cardiology has advised against increasing beta blocker dose due to the bradycardia. If bradycardia worsens/becomes symptomatic may need to discontinue beta blocker and Cardiology would consider pacemaker.  Cardiology deferred starting on antiarrythmic drug  No acute cardiac complaints  Follow up with Cardiology as appropriate/scheduled    Chronic obstructive pulmonary disease (HCC)  Seems to be quite mild, patient denies any recent breathing concerns  Has not been requiring any breathing treatments/inhalers  Continue to monitor - stable on room air  10/2013 outpatient PFTs reportedly showed mild obstruction  Consider outpatient PFTs with pulmonary as appropriate    Chronic constipation  At risk due to relative immobility, comorbidities including diabetes, iron supplement  Monitor stool output - Patient reports generally regular/easy BM recently stable  Bowel regimen at facility as ordered; continue miralax, docusate, senna regimen; consider bisacodyl suppository PRN if no BM in 2-3 days  Encourage mobility as tolerated, PO hydration as appropriate, high fiber diet/prune juice (in outpatient setting as appropriate)  Goal is for 1 easy BM every 1-2  days    GERD (gastroesophageal reflux disease)  Stable per patient  -monitor off medications  -recommend OOB with meals, sit upright for at least 30 minutes afterwards, avoid trigger foods  -continue to monitor  -follow up with GI as needed    Type 2 diabetes mellitus ([facility])    Lab Results   Component Value Date    HGBA1C 6.0 (H) 08/24/2023     A1c well controlled for age, recently 7.2 (goal for age would be <7.5 ideally while avoiding hypoglycemia/overly tight control)  Monitor glucose - fasting sugars generally well controlled in low 100s. Later in day typically high 100s - mid 200s  Avoid hypoglycemia  Continue insulin glargine 24u daily  Had planned to start Jardiance as this would help with glycemic control as well as theoretically improve outcomes in CHF and should be tolerated with his level of CKD - however patient requested to not add any further diabetic pills to regimen and plans to f/u with PCP as he plans to move out of facility in near future.  Monitor A1c routinely  Encourage lifestyle modifications including healthy diet, weight management, exercise as appropriate  Follow up with Ophthalmology/Podiatry as appropriate      Stage 3b chronic kidney disease (CKD) ([facility])  Lab Results   Component Value Date    EGFR 56 (L) 11/28/2023    EGFR 48 (L) 11/07/2023    EGFR 49 (L) 10/24/2023    CREATININE 1.28 11/28/2023    CREATININE 1.47 (H) 11/07/2023    CREATININE 1.44 (H) 10/24/2023     Reported hx of RCC s/p nephrectomy 2006  eGFR (baseline seems 30s-40s)  Seems consistent with CKD3b likely in setting of diabetes  Monitor renal function on routine labs - stable  Avoid nephrotoxins, NSAIDs as able  Encourage PO hydration, respecting volume status  Follow up with Nephrology as needed    Anemia, chronic disease  Likely multifactorial in setting of CKD and suspect iron deficiency  Continues on iron supplementation  -monitor on routine labs - stable  -monitor for acute bleed - no present signs  -consider further  "workup, Heme consult if persistent/worsening  -transfuse PRN Hb <7    Ambulatory dysfunction  Mainly uses wheelchair, at times rollator at baseline  Denies recent falls  -PT/OT as appropriate  -fall precautions  -encourage appropriate DME use  -SW following for safe discharge planning/homecare services as appropriate      Vision loss of right eye  Patient has noted chronic stable central vision loss with intact peripheral vision in R eye  No acute change or associated pain  Ophthalmology following at facility  Continue to monitor for acute change - stable  F/u with PCP, Ophtho    History of prostate cancer  Reported hx of BPH and hx prostate CA s/p XRT as per chart review  No apparent acute/associated symptoms at present  Reports urinating well independently  Maintained on terazosin  Monitor for acute urinary change  Recent PSA WNL  Follow up with Urology as needed.    Chronic left shoulder pain  Patient has noted chronic/\"lifelong\" left shoulder soreness/pain, stable, mild, intermittent, no acute change  Demonstrated good ROM and no tenderness on exam  He has not been interested in aggressive workup/intervention for this but had requested topical Bengay daily PRN, which appears to be working well and he is happy with this  PT/OT/stretches as appropriate  Continue to monitor      Chronic gout due to renal impairment of multiple sites without tophus  Noted hx of gout  No present acute/associated complaints. Did appear to have a recent flare involving finger in Sept 2024 treated with colchicine, flare seems resolved  Continue allopurinol  Continue to monitor    Primary insomnia  Stable, notes sleeping well lately and even better since diuretic was previously retimed to morning  Continue melatonin, stable since recent decrease to 3mg qHS  Also on trazodone, has been gradually weaned to 25mg qHS and feels he is not yet ready to wean further  Consider further weaning if remaining stable  Encourage sleep hygiene  Continue " to monitor  F/u with PCP           Review of Systems   Constitutional:  Negative for activity change, appetite change, chills, diaphoresis, fatigue and fever.   HENT:  Positive for hearing loss (mild, chronic, stable). Negative for drooling, ear pain, rhinorrhea, sore throat and trouble swallowing.    Eyes:  Negative for pain, discharge, redness, itching and visual disturbance (chronic R eye central vision loss stable).   Respiratory:  Negative for cough, choking, chest tightness, shortness of breath and wheezing.    Cardiovascular:  Positive for leg swelling. Negative for chest pain and palpitations.   Gastrointestinal:  Negative for abdominal pain, blood in stool, constipation, diarrhea, nausea and vomiting.   Genitourinary:  Negative for difficulty urinating, dysuria, flank pain and hematuria.   Musculoskeletal:  Positive for gait problem. Negative for arthralgias, back pain and neck pain.   Skin:  Negative for color change.   Neurological:  Negative for dizziness, tremors, facial asymmetry, speech difficulty, weakness, light-headedness and headaches.   Hematological:  Does not bruise/bleed easily.   Psychiatric/Behavioral:  Negative for agitation, behavioral problems and confusion. The patient is not nervous/anxious and is not hyperactive.        Vital Signs:     BP: 134/62 mmHg  4/30/2025 09:20   Temp:97.7 °F  4/22/2025 12:46 Pulse:76 bpm  4/30/2025 09:20 Weight:230.6 Lbs  4/30/2025 10:17   Resp:18 Breaths/min  4/22/2025 12:46 BS:130 mg/dL  4/30/2025 07:28 O2:97 %  4/22/2025 12:46 Pain:0  4/30/2025 07:27       Exam:     Physical Exam  Vitals reviewed.   Constitutional:       General: He is not in acute distress.     Appearance: He is obese. He is not toxic-appearing or diaphoretic.   HENT:      Head: Normocephalic and atraumatic.      Right Ear: External ear normal.      Left Ear: External ear normal.      Nose: Nose normal. No rhinorrhea.      Mouth/Throat:      Mouth: Mucous membranes are dry.      Pharynx:  Oropharynx is clear. No oropharyngeal exudate or posterior oropharyngeal erythema.   Eyes:      General: No scleral icterus.        Right eye: No discharge.         Left eye: No discharge.      Extraocular Movements: Extraocular movements intact.      Conjunctiva/sclera: Conjunctivae normal.      Pupils: Pupils are equal, round, and reactive to light.   Cardiovascular:      Rate and Rhythm: Normal rate and regular rhythm.   Pulmonary:      Effort: Pulmonary effort is normal. No respiratory distress.      Breath sounds: No wheezing or rales.   Abdominal:      General: Bowel sounds are normal. There is no distension.      Palpations: Abdomen is soft.      Tenderness: There is no abdominal tenderness. There is no guarding.   Musculoskeletal:         General: Swelling present. No tenderness.      Cervical back: No rigidity.      Comments: 1+ bilateral lower extremity edema, no noted open wound/active drainage/erythema  No calf tenderness   Skin:     General: Skin is warm and dry.      Coloration: Skin is not jaundiced.   Neurological:      General: No focal deficit present.      Mental Status: He is alert and oriented to person, place, and time. Mental status is at baseline.   Psychiatric:         Mood and Affect: Mood normal.         Behavior: Behavior normal.         Thought Content: Thought content normal.         Judgment: Judgment normal.           Discharge Medications: See discharge medication list which was reviewed and signed.    Status at time of discharge: Stable    Discussion with patient/family as appropriate and further instructions:  -Fall precautions  -Aspiration precautions  -Bleeding precautions  -Monitor for signs/symptoms of infection  -Medication list was reviewed and signed  -DME form was completed    Follow-up Recommendations: Please follow-up with your primary care physician within 7-10 days of discharge to review medication changes and current status.     Problem List Follow-up  Recommendations:      -Total time spent on this encounter today including documentation and workup review, face to face time, history and exam, and documentation/orders was approximately 55 minutes.  -This note will be copied to [facility] EMR where vitals and medication orders are placed.    Raúl Clemente D.O.  Geriatric Medicine  4/30/2025 12:54 PM

## 2025-04-30 NOTE — ASSESSMENT & PLAN NOTE
Lab Results   Component Value Date    HGBA1C 6.0 (H) 08/24/2023     A1c well controlled for age, recently 7.2 (goal for age would be <7.5 ideally while avoiding hypoglycemia/overly tight control)  Monitor glucose - fasting sugars generally well controlled in low 100s. Later in day typically high 100s - mid 200s  Avoid hypoglycemia  Continue insulin glargine 24u daily  Had planned to start Jardiance as this would help with glycemic control as well as theoretically improve outcomes in CHF and should be tolerated with his level of CKD - however patient requested to not add any further diabetic pills to regimen and plans to f/u with PCP as he plans to move out of facility in near future.  Monitor A1c routinely  Encourage lifestyle modifications including healthy diet, weight management, exercise as appropriate  Follow up with Ophthalmology/Podiatry as appropriate

## 2025-04-30 NOTE — ASSESSMENT & PLAN NOTE
Population Health Chart Review & Patient Outreach Details      Additional [facility] Health Notes:    CAMPAIGN- Preventative Care Screening  [facility] doesn't have the report.  Informed patient to help get records.  2nd time           Updates Requested / Reviewed:      Care Everywhere, , and External Sources: [facility]         Health Maintenance Topics Overdue:      [facility] Score: 1     Colon Cancer Screening                       Health Maintenance Topic(s) Outreach Outcomes & Actions Taken:    Colorectal Cancer Screening - Outreach Outcomes & Actions Taken  : External Records Requested & Care Team Updated if Applicable       Reported hx of BPH and hx prostate CA s/p XRT as per chart review  No apparent acute/associated symptoms at present  Reports urinating well independently  Maintained on terazosin  Monitor for acute urinary change  Recent PSA WNL  Follow up with Urology as needed.

## 2025-04-30 NOTE — ASSESSMENT & PLAN NOTE
Mainly uses wheelchair, at times rollator at baseline  Denies recent falls  -PT/OT as appropriate  -fall precautions  -encourage appropriate DME use  -SW following for safe discharge planning/homecare services as appropriate

## 2025-04-30 NOTE — ASSESSMENT & PLAN NOTE
Wt Readings from Last 3 Encounters:   02/20/25 102 kg (224 lb)   11/05/24 102 kg (224 lb 11.2 oz)   07/15/24 104 kg (229 lb 6.4 oz)       Patient with chronic dyspnea on exertion and peripheral edema. Chart with noted diagnosis of CHF.   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 As of recent Cardiology visit Dec 2024 it was felt that further aggressive workup would be of low yield and unlikely to change treatment course given goals of care and that diuretic could be adjusted/titrated to comfort.  Monitor daily weight - stable, no alarming uptrend  Monitor volume status clinically - peripheral edema seems improved to baseline, no orthopnea, no alarm symptoms of acute CHF exacerbation  Encourage use of compression stockings, he is wearing them regularly  Continue low salt diet  Continue torsemide, recent increase to 60mg daily, seems stable on this, continue to monitor closely and adjust dose as appropriate  Following with Cardiology

## 2025-04-30 NOTE — ASSESSMENT & PLAN NOTE
Monitor BP - generally controlled/stable around 110s-130s systolic  Pulse generally 50s-70s, sometimes borderline bradycardic in 50s, stable/asymptomatic  No acute cardiac complaints  Avoid hypotension  Continue regimen including carvedilol 1.56mg BID, terazosin 1mg daily, torsemide 60mg daily  Consider adjusting/weaning regimen as appropriate  Follow up with Cardiology

## 2025-04-30 NOTE — ASSESSMENT & PLAN NOTE
Stable, notes sleeping well lately and even better since diuretic was previously retimed to morning  Continue melatonin, stable since recent decrease to 3mg qHS  Also on trazodone, has been gradually weaned to 25mg qHS and feels he is not yet ready to wean further  Consider further weaning if remaining stable  Encourage sleep hygiene  Continue to monitor  F/u with PCP

## 2025-05-01 RX ORDER — LANOLIN ALCOHOL/MO/W.PET/CERES
CREAM (GRAM) TOPICAL
Qty: 30 CAPSULE | Refills: 0 | Status: SHIPPED | OUTPATIENT
Start: 2025-05-01